# Patient Record
Sex: FEMALE | Race: BLACK OR AFRICAN AMERICAN | Employment: FULL TIME | URBAN - METROPOLITAN AREA
[De-identification: names, ages, dates, MRNs, and addresses within clinical notes are randomized per-mention and may not be internally consistent; named-entity substitution may affect disease eponyms.]

---

## 2017-07-10 DIAGNOSIS — Z01.419 WELL WOMAN EXAM WITH ROUTINE GYNECOLOGICAL EXAM: ICD-10-CM

## 2017-07-10 DIAGNOSIS — Z30.09 FAMILY PLANNING: Primary | ICD-10-CM

## 2017-07-10 RX ORDER — NORETHINDRONE ACETATE AND ETHINYL ESTRADIOL 1MG-20(21)
1 KIT ORAL DAILY
Qty: 1 PACKAGE | Refills: 11 | Status: SHIPPED | OUTPATIENT
Start: 2017-07-10 | End: 2019-03-08 | Stop reason: ALTCHOICE

## 2017-07-12 ENCOUNTER — HOSPITAL ENCOUNTER (OUTPATIENT)
Dept: LAB | Age: 42
Discharge: HOME | End: 2017-07-12
Payer: MEDICARE

## 2017-07-12 ENCOUNTER — OFFICE VISIT (OUTPATIENT)
Dept: OBGYN CLINIC | Age: 42
End: 2017-07-12

## 2017-07-12 VITALS
SYSTOLIC BLOOD PRESSURE: 131 MMHG | WEIGHT: 250 LBS | DIASTOLIC BLOOD PRESSURE: 74 MMHG | HEIGHT: 66 IN | BODY MASS INDEX: 40.18 KG/M2 | HEART RATE: 101 BPM

## 2017-07-12 DIAGNOSIS — Z11.3 SCREEN FOR STD (SEXUALLY TRANSMITTED DISEASE): ICD-10-CM

## 2017-07-12 DIAGNOSIS — Z01.419 WELL WOMAN EXAM WITH ROUTINE GYNECOLOGICAL EXAM: Primary | ICD-10-CM

## 2017-07-12 LAB — RPR SER QL: NONREACTIVE

## 2017-07-12 PROCEDURE — 86592 SYPHILIS TEST NON-TREP QUAL: CPT | Performed by: ADVANCED PRACTICE MIDWIFE

## 2017-07-12 PROCEDURE — 87491 CHLMYD TRACH DNA AMP PROBE: CPT | Performed by: ADVANCED PRACTICE MIDWIFE

## 2017-07-12 NOTE — PROGRESS NOTES
Subjective:   37 y.o. female for Well Woman Check. Patient's last menstrual period was 2017. Social History: single partner, contraception - OCP (Oral Contraceptive Pills). Pertinent past medical hstory: see medical hx below. Patient Active Problem List   Diagnosis Code    Bipolar 1 disorder ([facility].) F31.9    Hyperlipidemia E78.5    Genital herpes A60.00     Current Outpatient Prescriptions   Medication Sig Dispense Refill    norethindrone-ethinyl estradiol (GILDESS FE , ,) 1 mg-20 mcg (21)/75 mg (7) tab Take 1 Tab by mouth daily. Indications: Pregnancy Contraception 1 Package 11    pantoprazole (PROTONIX) 20 mg tablet Take 1 Tab by mouth daily. 30 Tab 0    topiramate (TOPAMAX) 25 mg tablet Take  by mouth two (2) times a day.  fenofibrate nanocrystallized (TRICOR) 48 mg tablet Take 1 Tab by mouth daily. 30 Tab 3    montelukast (SINGULAIR) 10 mg tablet Take 1 Tab by mouth daily. 30 Tab 5    acyclovir (ZOVIRAX) 400 mg tablet Take 1 Tab by mouth two (2) times a day. 60 Tab 5    fluticasone (FLONASE) 50 mcg/actuation nasal spray 2 Sprays by Both Nostrils route once.  ondansetron (ZOFRAN ODT) 8 mg disintegrating tablet Take 1 Tab by mouth every eight (8) hours as needed for Nausea. 20 Tab 0    phentermine 37.5 mg capsule Take 37.5 mg by mouth every morning. Max Daily Amount: 37.5 mg. 30 Cap 2    levocetirizine (XYZAL) 5 mg tablet Take 1 Tab by mouth daily. 30 Tab 2    MULTIVITAMIN PO Take  by mouth.  ARIPiprazole (ABILIFY) 15 mg tablet Take 15 mg by mouth daily.        No Known Allergies  Past Medical History:   Diagnosis Date    Bipolar 1 disorder ([facility]     Borderline diabetes     Genital herpes     Hyperlipidemia     Hyperlipidemia      Past Surgical History:   Procedure Laterality Date    HX  SECTION       Family History   Problem Relation Age of Onset    Hypertension Mother     Heart Disease Father     Heart Surgery Father     Hypertension Father    Jed Ross Diabetes Maternal Aunt      Social History   Substance Use Topics    Smoking status: Never Smoker    Smokeless tobacco: Never Used    Alcohol use No        ROS:  Feeling well. No dyspnea or chest pain on exertion. No abdominal pain, change in bowel habits, black or bloody stools. No urinary tract symptoms. GYN ROS: normal menses, no abnormal bleeding, pelvic pain or discharge, no breast pain or new or enlarging lumps on self exam. No neurological complaints. Objective:     Visit Vitals    /74 (BP 1 Location: Right arm, BP Patient Position: Sitting)    Pulse (!) 101    Ht 5' 6\" (1.676 m)    Wt 250 lb (113.4 kg)    LMP 07/04/2017    BMI 40.35 kg/m2     The patient appears well, alert, oriented x 3, in no distress. ENT normal.  Neck supple. No adenopathy or thyromegaly. TIM. Lungs are clear, good air entry, no wheezes, rhonchi or rales. S1 and S2 normal, no murmurs, regular rate and rhythm. Abdomen soft without tenderness, guarding, mass or organomegaly. Extremities show no edema, normal peripheral pulses. Neurological is normal, no focal findings.     BREAST EXAM: breasts appear normal, no suspicious masses, no skin or nipple changes or axillary nodes    PELVIC EXAM: VULVA: normal appearing vulva with no masses, tenderness or lesions, VAGINA: normal appearing vagina with normal color and discharge, no lesions, CERVIX: normal appearing cervix without discharge or lesions, DNA probe for chlamydia and GC obtained, cervical motion tenderness absent, UTERUS: uterus is normal size, shape, consistency and nontender, ADNEXA: normal adnexa in size, nontender and no masses    Assessment/Plan:   well woman  no contraindication to continue use of oral contraceptives  [facility] [address] [facility] - normal  pap smear normal hx, due 2019  counseled on breast self exam, mammography screening, STD prevention, HIV risk factors and prevention, use and side effects of OCP's and adequate intake of calcium and vitamin D  return annually or prn    ICD-10-CM ICD-9-CM    1. Well woman exam with routine gynecological exam Z01.419 V72.31    2. Screen for STD (sexually transmitted disease) Z11.3 V74.5 CT/NG/T.VAGINALIS AMPLIFICATION     reviewed diet, exercise and weight control.

## 2017-07-12 NOTE — PATIENT INSTRUCTIONS

## 2017-07-13 LAB
C TRACH RRNA SPEC QL NAA+PROBE: NEGATIVE
N GONORRHOEA RRNA SPEC QL NAA+PROBE: NEGATIVE
SPECIMEN SOURCE: NORMAL
T VAGINALIS RRNA SPEC QL NAA+PROBE: NEGATIVE

## 2018-08-09 ENCOUNTER — HOSPITAL ENCOUNTER (OUTPATIENT)
Dept: LAB | Age: 43
Discharge: HOME | End: 2018-08-09
Payer: MEDICARE

## 2018-08-09 ENCOUNTER — OFFICE VISIT (OUTPATIENT)
Dept: OBGYN CLINIC | Age: 43
End: 2018-08-09

## 2018-08-09 VITALS
BODY MASS INDEX: 42.65 KG/M2 | HEIGHT: 66 IN | WEIGHT: 265.4 LBS | DIASTOLIC BLOOD PRESSURE: 83 MMHG | HEART RATE: 108 BPM | SYSTOLIC BLOOD PRESSURE: 127 MMHG | TEMPERATURE: 98.9 F | RESPIRATION RATE: 18 BRPM | OXYGEN SATURATION: 98 %

## 2018-08-09 DIAGNOSIS — Z01.419 WELL WOMAN EXAM WITH ROUTINE GYNECOLOGICAL EXAM: Primary | ICD-10-CM

## 2018-08-09 PROBLEM — E66.01 OBESITY, MORBID (HCC): Status: ACTIVE | Noted: 2018-08-09

## 2018-08-09 PROCEDURE — 87624 HPV HI-RISK TYP POOLED RSLT: CPT | Performed by: OBSTETRICS & GYNECOLOGY

## 2018-08-09 PROCEDURE — 88175 CYTOPATH C/V AUTO FLUID REDO: CPT | Performed by: OBSTETRICS & GYNECOLOGY

## 2018-08-09 PROCEDURE — 87491 CHLMYD TRACH DNA AMP PROBE: CPT | Performed by: OBSTETRICS & GYNECOLOGY

## 2018-08-09 NOTE — PROGRESS NOTES
Subjective:   40 y.o. female for Well Woman Check. Patient's last menstrual period was 07/23/2018 (exact date). Social History: single partner, contraception - none. Pertinent past medical hstory: no history of HTN, DVT, CAD, DM, liver disease, migraines or smoking. ROS:  Feeling well. No dyspnea or chest pain on exertion. No abdominal pain, change in bowel habits, black or bloody stools. No urinary tract symptoms. GYN ROS: normal menses, no abnormal bleeding, pelvic pain or discharge, no breast pain or new or enlarging lumps on self exam. No neurological complaints. Objective:     Visit Vitals    /83 (BP 1 Location: Left arm, BP Patient Position: Sitting)    Pulse (!) 108    Temp 98.9 °F (37.2 °C) (Oral)    Resp 18    Ht 5' 6\" (1.676 m)    Wt 265 lb 6.4 oz (120.4 kg)    LMP 07/23/2018 (Exact Date)    SpO2 98%    BMI 42.84 kg/m2     The patient appears well, alert, oriented x 3, in no distress. ENT normal.  Neck supple. No adenopathy or thyromegaly. TIM. Lungs are clear, good air entry, no wheezes, rhonchi or rales. S1 and S2 normal, no murmurs, regular rate and rhythm. Abdomen soft without tenderness, guarding, mass or organomegaly. Extremities show no edema, normal peripheral pulses. Neurological is normal, no focal findings. BREAST EXAM: breasts appear normal, no suspicious masses, no skin or nipple changes or axillary nodes    PELVIC EXAM: normal external genitalia, vulva, vagina, cervix, uterus and adnexa    Assessment/Plan:   well woman  pap smear  return annually or prn  . Subjective:   40 y.o. female for Well Woman Check. Patient's last menstrual period was 07/23/2018 (exact date). Social History: single partner, contraception - none. Pertinent past medical hstory: no history of HTN, DVT, CAD, DM, liver disease, migraines or smoking. ROS:  Feeling well. No dyspnea or chest pain on exertion.   No abdominal pain, change in bowel habits, black or bloody stools. No urinary tract symptoms. GYN ROS: normal menses, no abnormal bleeding, pelvic pain or discharge, no breast pain or new or enlarging lumps on self exam. No neurological complaints. Objective:     Visit Vitals    /83 (BP 1 Location: Left arm, BP Patient Position: Sitting)    Pulse (!) 108    Temp 98.9 °F (37.2 °C) (Oral)    Resp 18    Ht 5' 6\" (1.676 m)    Wt 265 lb 6.4 oz (120.4 kg)    LMP 07/23/2018 (Exact Date)    SpO2 98%    BMI 42.84 kg/m2     The patient appears well, alert, oriented x 3, in no distress. ENT normal.  Neck supple. No adenopathy or thyromegaly. TIM. Lungs are clear, good air entry, no wheezes, rhonchi or rales. S1 and S2 normal, no murmurs, regular rate and rhythm. Abdomen soft without tenderness, guarding, mass or organomegaly. Extremities show no edema, normal peripheral pulses. Neurological is normal, no focal findings. BREAST EXAM: breasts appear normal, no suspicious masses, no skin or nipple changes or axillary nodes    PELVIC EXAM: normal external genitalia, vulva, vagina, cervix, uterus and adnexa    Assessment/Plan:   well woman  mammogram  pap smear  return annually or prn    ICD-10-CM ICD-9-CM    1. Well woman exam with routine gynecological exam Z01.419 V72.31 PAP IG, CT-NG-TV, APTIMA HPV AND [address] 72/89,11([id number])    [V72.31]   .

## 2018-08-09 NOTE — MR AVS SNAPSHOT
[place] 
 
 
 [address], [address] [address] [facility] [address] 
[phone] Patient: Chrystal Tillman MRN: [id number] HZX:[birth year] Visit Information Date & Time Provider Department Dept. Phone Encounter #  
 8/9/2018  2:30 PM Melida Lizama [id number] Follow-up Instructions Return in about 1 year (around 8/9/2019). Your Appointments 8/24/2018  2:30 PM  
ANNUAL with Autumn Valdes MD  
[facility] ([facility]) Appt Note: annual  
 [address], [place] [address] [facility] [address]  
[phone]  
  
   
 [address], [address] [address] Upcoming Health Maintenance Date Due Influenza Age 5 to Adult 8/1/2018 PAP AKA CERVICAL CYTOLOGY 6/27/2019 Allergies as of 8/9/2018  Review Complete On: 8/9/2018 By: Autumn Valdes MD  
 No Known Allergies Current Immunizations  Never Reviewed No immunizations on file. Not reviewed this visit You Were Diagnosed With   
  
 Codes Comments Well woman exam with routine gynecological exam    -  Primary ICD-10-CM: B90.125 ICD-9-CM: V72.31 [V72.31] Vitals BP Pulse Temp Resp Height(growth percentile) Weight(growth percentile) 127/83 (BP 1 Location: Left arm, BP Patient Position: Sitting) (!) 108 98.9 °F (37.2 °C) (Oral) 18 5' 6\" (1.676 m) 265 lb 6.4 oz (120.4 kg) LMP SpO2 BMI OB Status Smoking Status 07/23/2018 (Exact Date) 98% 42.84 kg/m2 Having regular periods Never Smoker Vitals History BMI and BSA Data Body Mass Index Body Surface Area  
 42.84 kg/m 2 2.37 m 2 Preferred Pharmacy Pharmacy Name Phone [facility], [facility] [phone] Your Updated Medication List  
  
   
This list is accurate as of 8/9/18 11:59 PM.  Always use your most recent med list.  
  
  
  
  
 ABILIFY 15 mg tablet Generic drug:  ARIPiprazole Take 20 mg by mouth daily. acyclovir 400 mg tablet Commonly known as:  ZOVIRAX Take 1 Tab by mouth two (2) times a day. fenofibrate nanocrystallized 48 mg tablet Commonly known as:  [employer] Take 1 Tab by mouth daily. fluticasone 50 mcg/actuation nasal spray Commonly known as:  Beni [place] 2 Sprays by Both Nostrils route once. levocetirizine 5 mg tablet Commonly known as:  Andres Evelio Take 1 Tab by mouth daily. montelukast 10 mg tablet Commonly known as:  SINGULAIR Take 1 Tab by mouth daily. MULTIVITAMIN PO Take  by mouth. norethindrone-ethinyl estradiol 1 mg-20 mcg (21)/75 mg (7) Tab Commonly known as:  GILDESS FE 1/20 (28) Take 1 Tab by mouth daily. Indications: Pregnancy Contraception  
  
 ondansetron 8 mg disintegrating tablet Commonly known as:  ZOFRAN ODT Take 1 Tab by mouth every eight (8) hours as needed for Nausea. pantoprazole 20 mg tablet Commonly known as:  PROTONIX Take 1 Tab by mouth daily. phentermine 37.5 mg capsule Take 37.5 mg by mouth every morning. Max Daily Amount: 37.5 mg.  
  
 topiramate 25 mg tablet Commonly known as:  TOPAMAX Take  by mouth two (2) times a day. Follow-up Instructions Return in about 1 year (around 8/9/2019). Patient Instructions Well Visit, Ages 25 to 48: Care Instructions Your Care Instructions Physical exams can help you stay healthy. Your doctor has checked your overall health and may have suggested ways to take good care of yourself. He or she also may have recommended tests. At home, you can help prevent illness with healthy eating, regular exercise, and other steps. Follow-up care is a key part of your treatment and safety. Be sure to make and go to all appointments, and call your doctor if you are having problems. It's also a good idea to know your test results and keep a list of the medicines you take. How can you care for yourself at home? · Reach and stay at a healthy weight. This will lower your risk for many problems, such as obesity, diabetes, heart disease, and high blood pressure. · Get at least 30 minutes of physical activity on most days of the week. Walking is a good choice. You also may want to do other activities, such as running, swimming, cycling, or playing tennis or team sports. Discuss any changes in your exercise program with your doctor. · Do not smoke or allow others to smoke around you. If you need help quitting, talk to your doctor about stop-smoking programs and medicines. These can increase your chances of quitting for good. · Talk to your doctor about whether you have any risk factors for sexually transmitted infections (STIs). Having one sex partner (who does not have STIs and does not have sex with anyone else) is a good way to avoid these infections. · Use birth control if you do not want to have children at this time. Talk with your doctor about the choices available and what might be best for you. · Protect your skin from too much sun. When you're outdoors from 10 a.m. to 4 p.m., stay in the shade or cover up with clothing and a hat with a wide brim. Wear sunglasses that block UV rays. Even when it's cloudy, put broad-spectrum sunscreen (SPF 30 or higher) on any exposed skin. · See a dentist one or two times a year for checkups and to have your teeth cleaned. · Wear a seat belt in the car. · Drink alcohol in moderation, if at all. That means no more than 2 drinks a day for men and 1 drink a day for women. Follow your doctor's advice about when to have certain tests. These tests can spot problems early. For everyone · Cholesterol. Have the fat (cholesterol) in your blood tested after age 21. Your doctor will tell you how often to have this done based on your age, family history, or other things that can increase your risk for heart disease. · Blood pressure. Have your blood pressure checked during a routine doctor visit. Your doctor will tell you how often to check your blood pressure based on your age, your blood pressure results, and other factors. · Vision. Talk with your doctor about how often to have a glaucoma test. 
· Diabetes. Ask your doctor whether you should have tests for diabetes. · Colon cancer. Have a test for colon cancer at age 48. You may have one of several tests. If you are younger than 48, you may need a test earlier if you have any risk factors. Risk factors include whether you already had a precancerous polyp removed from your colon or whether your parent, brother, sister, or child has had colon cancer. For women · Breast exam and mammogram. Talk to your doctor about when you should have a clinical breast exam and a mammogram. Medical experts differ on whether and how often women under 50 should have these tests. Your doctor can help you decide what is right for you. · Pap test and pelvic exam. Begin Pap tests at age 24. A Pap test is the best way to find cervical cancer. The test often is part of a pelvic exam. Ask how often to have this test. 
· Tests for sexually transmitted infections (STIs). Ask whether you should have tests for STIs. You may be at risk if you have sex with more than one person, especially if your partners do not wear condoms. For men · Tests for sexually transmitted infections (STIs). Ask whether you should have tests for STIs. You may be at risk if you have sex with more than one person, especially if you do not wear a condom. · Testicular cancer exam. Ask your doctor whether you should check your testicles regularly. · Prostate exam. Talk to your doctor about whether you should have a blood test (called a PSA test) for prostate cancer.  Experts differ on whether and when men should have this test. Some experts suggest it if you are older than 39 and are -American or have a father or brother who got prostate cancer when he was younger than 72. When should you call for help? Watch closely for changes in your health, and be sure to contact your doctor if you have any problems or symptoms that concern you. Where can you learn more? Go to http://abdi-lilian.info/. Enter P072 in the search box to learn more about \"Well Visit, Ages 25 to 48: Care Instructions. \" Current as of: May 16, 2017 Content Version: 11.7 © 8180-1436 [employer]. Care instructions adapted under license by [employer] (which disclaims liability or warranty for this information). If you have questions about a medical condition or this instruction, always ask your healthcare professional. [address] any warranty or liability for your use of this information. Well Visit, Ages 25 to 48: Care Instructions Your Care Instructions Physical exams can help you stay healthy. Your doctor has checked your overall health and may have suggested ways to take good care of yourself. He or she also may have recommended tests. At home, you can help prevent illness with healthy eating, regular exercise, and other steps. Follow-up care is a key part of your treatment and safety. Be sure to make and go to all appointments, and call your doctor if you are having problems. It's also a good idea to know your test results and keep a list of the medicines you take. How can you care for yourself at home? · Reach and stay at a healthy weight. This will lower your risk for many problems, such as obesity, diabetes, heart disease, and high blood pressure. · Get at least 30 minutes of physical activity on most days of the week. Walking is a good choice. You also may want to do other activities, such as running, swimming, cycling, or playing tennis or team sports.  Discuss any changes in your exercise program with your doctor. · Do not smoke or allow others to smoke around you. If you need help quitting, talk to your doctor about stop-smoking programs and medicines. These can increase your chances of quitting for good. · Talk to your doctor about whether you have any risk factors for sexually transmitted infections (STIs). Having one sex partner (who does not have STIs and does not have sex with anyone else) is a good way to avoid these infections. · Use birth control if you do not want to have children at this time. Talk with your doctor about the choices available and what might be best for you. · Protect your skin from too much sun. When you're outdoors from 10 a.m. to 4 p.m., stay in the shade or cover up with clothing and a hat with a wide brim. Wear sunglasses that block UV rays. Even when it's cloudy, put broad-spectrum sunscreen (SPF 30 or higher) on any exposed skin. · See a dentist one or two times a year for checkups and to have your teeth cleaned. · Wear a seat belt in the car. · Drink alcohol in moderation, if at all. That means no more than 2 drinks a day for men and 1 drink a day for women. Follow your doctor's advice about when to have certain tests. These tests can spot problems early. For everyone · Cholesterol. Have the fat (cholesterol) in your blood tested after age 21. Your doctor will tell you how often to have this done based on your age, family history, or other things that can increase your risk for heart disease. · Blood pressure. Have your blood pressure checked during a routine doctor visit. Your doctor will tell you how often to check your blood pressure based on your age, your blood pressure results, and other factors. · Vision. Talk with your doctor about how often to have a glaucoma test. 
· Diabetes. Ask your doctor whether you should have tests for diabetes. · Colon cancer. Have a test for colon cancer at age 48.  You may have one of several tests. If you are younger than 48, you may need a test earlier if you have any risk factors. Risk factors include whether you already had a precancerous polyp removed from your colon or whether your parent, brother, sister, or child has had colon cancer. For women · Breast exam and mammogram. Talk to your doctor about when you should have a clinical breast exam and a mammogram. Medical experts differ on whether and how often women under 50 should have these tests. Your doctor can help you decide what is right for you. · Pap test and pelvic exam. Begin Pap tests at age 24. A Pap test is the best way to find cervical cancer. The test often is part of a pelvic exam. Ask how often to have this test. 
· Tests for sexually transmitted infections (STIs). Ask whether you should have tests for STIs. You may be at risk if you have sex with more than one person, especially if your partners do not wear condoms. For men · Tests for sexually transmitted infections (STIs). Ask whether you should have tests for STIs. You may be at risk if you have sex with more than one person, especially if you do not wear a condom. · Testicular cancer exam. Ask your doctor whether you should check your testicles regularly. · Prostate exam. Talk to your doctor about whether you should have a blood test (called a PSA test) for prostate cancer. Experts differ on whether and when men should have this test. Some experts suggest it if you are older than 39 and are -American or have a father or brother who got prostate cancer when he was younger than 72. When should you call for help? Watch closely for changes in your health, and be sure to contact your doctor if you have any problems or symptoms that concern you. Where can you learn more? Go to http://abdi-lilian.info/. Enter P072 in the search box to learn more about \"Well Visit, Ages 25 to 48: Care Instructions. \" Current as of: May 16, 2017 Content Version: 11.7 © 9530-6310 [employer]. Care instructions adapted under license by [employer] (which disclaims liability or warranty for this information). If you have questions about a medical condition or this instruction, always ask your healthcare professional. [address] any warranty or liability for your use of this information. Introducing \[facility] [place] [facility]\"" & HEALTH SERVICES! [facility] introduces [employer] patient portal. Now you can access parts of your medical record, email your doctor's office, and request medication refills online. 1. In your internet browser, go to [email or web address] 2. Click on the First Time User? Click Here link in the Sign In box. You will see the New Member Sign Up page. 3. Enter your [employer] Access Code exactly as it appears below. You will not need to use this code after youve completed the sign-up process. If you do not sign up before the expiration date, you must request a new code. · [employer] Access Code: [email or web address] Expires: 11/7/2018  3:29 PM 
 
4. Enter the last four digits of your Social Security Number (xxxx) and Date of Birth (mm/dd/yyyy) as indicated and click Submit. You will be taken to the next sign-up page. 5. Create a [employer] ID. This will be your [employer] login ID and cannot be changed, so think of one that is secure and easy to remember. 6. Create a [employer] password. You can change your password at any time. 7. Enter your Password Reset Question and Answer. This can be used at a later time if you forget your password. 8. Enter your e-mail address. You will receive e-mail notification when new information is available in [address] 9. Click Sign Up. You can now view and download portions of your medical record. 10. Click the Download Summary menu link to download a portable copy of your medical information. If you have questions, please visit the Frequently Asked Questions section of the [employer] website. Remember, [employer] is NOT to be used for urgent needs. For medical emergencies, dial 911. Now available from your iPhone and Android! Please provide this summary of care documentation to your next provider. Your primary care clinician is listed as Kiara Bo. If you have any questions after today's visit, please call [phone].

## 2018-08-09 NOTE — PATIENT INSTRUCTIONS

## 2019-03-08 ENCOUNTER — OFFICE VISIT (OUTPATIENT)
Dept: FAMILY MEDICINE CLINIC | Age: 44
End: 2019-03-08

## 2019-03-08 VITALS
WEIGHT: 269 LBS | HEIGHT: 65 IN | DIASTOLIC BLOOD PRESSURE: 83 MMHG | HEART RATE: 93 BPM | SYSTOLIC BLOOD PRESSURE: 137 MMHG | RESPIRATION RATE: 16 BRPM | OXYGEN SATURATION: 100 % | BODY MASS INDEX: 44.82 KG/M2 | TEMPERATURE: 98.5 F

## 2019-03-08 DIAGNOSIS — E78.5 HYPERLIPIDEMIA, UNSPECIFIED HYPERLIPIDEMIA TYPE: ICD-10-CM

## 2019-03-08 DIAGNOSIS — Z00.00 WELL WOMAN EXAM (NO GYNECOLOGICAL EXAM): Primary | ICD-10-CM

## 2019-03-08 DIAGNOSIS — R73.03 PREDIABETES: ICD-10-CM

## 2019-03-08 NOTE — PROGRESS NOTES
Subjective:   44 y.o. female for Well Woman Check. Her gyne and breast care is done elsewhere by her Ob-Gyne physician. Patient Active Problem List   Diagnosis Code    Bipolar 1 disorder ([facility] F31.9    Hyperlipidemia E78.5    Genital herpes A60.00    Obesity, morbid ([facility] E66.01     Patient Active Problem List    Diagnosis Date Noted    Obesity, morbid ([facility] 2018    Genital herpes     Bipolar 1 disorder (HCC)     Hyperlipidemia      Current Outpatient Medications   Medication Sig Dispense Refill    montelukast (SINGULAIR) 10 mg tablet Take 1 Tab by mouth daily. 30 Tab 5    acyclovir (ZOVIRAX) 400 mg tablet Take 1 Tab by mouth two (2) times a day. 60 Tab 5    fluticasone (FLONASE) 50 mcg/actuation nasal spray 2 Sprays by Both Nostrils route once.  MULTIVITAMIN PO Take  by mouth.  ARIPiprazole (ABILIFY) 15 mg tablet Take 20 mg by mouth daily.  fenofibrate nanocrystallized (TRICOR) 48 mg tablet Take 1 Tab by mouth daily. 30 Tab 3    phentermine 37.5 mg capsule Take 37.5 mg by mouth every morning. Max Daily Amount: 37.5 mg. 30 Cap 2     No Known Allergies  Past Medical History:   Diagnosis Date    Bipolar 1 disorder ([facility]     Borderline diabetes     Genital herpes     Hyperlipidemia     Hyperlipidemia      Past Surgical History:   Procedure Laterality Date    HX  SECTION       Family History   Problem Relation Age of Onset    Hypertension Mother     Heart Disease Father     Heart Surgery Father     Hypertension Father     Diabetes Maternal Aunt      Social History     Tobacco Use    Smoking status: Never Smoker    Smokeless tobacco: Never Used   Substance Use Topics    Alcohol use: No     Alcohol/week: 0.0 oz        ROS: Feeling generally well. No TIA's or unusual headaches, no dysphagia. No prolonged cough. No dyspnea or chest pain on exertion. No abdominal pain, change in bowel habits, black or bloody stools. No urinary tract symptoms.   No new or unusual musculoskeletal symptoms. Specific concerns today: none. Objective: The patient appears well, alert, oriented x 3, in no distress. Visit Vitals  /83 (BP 1 Location: Left arm)   Pulse 93   Temp 98.5 °F (36.9 °C) (Oral)   Resp 16   Ht 5' 4.96\" (1.65 m)   Wt 269 lb (122 kg)   SpO2 100%   BMI 44.82 kg/m²     ENT normal.  Neck supple. No adenopathy or thyromegaly. TIM. Lungs are clear, good air entry, no wheezes, rhonchi or rales. S1 and S2 normal, no murmurs, regular rate and rhythm. Abdomen soft without tenderness, guarding, mass or organomegaly. Extremities show no edema, normal peripheral pulses. Neurological is normal, no focal findings. Breast and Pelvic exams are deferred. Assessment/Plan:   Well Woman  lose weight, increase physical activity, routine labs ordered    ICD-10-CM ICD-9-CM    1. Well woman exam (no gynecological exam) Z00.00 V70.0     [V70.0]   2. Prediabetes R73.03 790.29 HEMOGLOBIN A1C WITH EAG   3. Hyperlipidemia, unspecified hyperlipidemia type L48.3 242.1 METABOLIC PANEL, COMPREHENSIVE      LIPID PANEL     I have discussed the diagnosis with the patient and the intended plan as seen in the above orders. The patient has received an after-visit summary and questions were answered concerning future plans. I have discussed medication side effects and warnings with the patient as well. Patient agreeable with above plan and verbalizes understanding. Follow-up Disposition:  Return in about 1 year (around 3/8/2020), or if symptoms worsen or fail to improve, for CPE with fasting labs.

## 2019-03-08 NOTE — PROGRESS NOTES
Pt is here for re-establish care Well Woman Exam    1. Have you been to the ER, urgent care clinic since your last visit? Hospitalized since your last visit? Yes Urgent Care 1/19 Ear infection    2. Have you seen or consulted any other health care providers outside of the [address] [place] [address] since your last visit? Include any pap smears or colon screening.  No

## 2019-03-08 NOTE — PATIENT INSTRUCTIONS
Well Visit, Ages 25 to 48: Care Instructions  Your Care Instructions    Physical exams can help you stay healthy. Your doctor has checked your overall health and may have suggested ways to take good care of yourself. He or she also may have recommended tests. At home, you can help prevent illness with healthy eating, regular exercise, and other steps. Follow-up care is a key part of your treatment and safety. Be sure to make and go to all appointments, and call your doctor if you are having problems. It's also a good idea to know your test results and keep a list of the medicines you take. How can you care for yourself at home? · Reach and stay at a healthy weight. This will lower your risk for many problems, such as obesity, diabetes, heart disease, and high blood pressure. · Get at least 30 minutes of physical activity on most days of the week. Walking is a good choice. You also may want to do other activities, such as running, swimming, cycling, or playing tennis or team sports. Discuss any changes in your exercise program with your doctor. · Do not smoke or allow others to smoke around you. If you need help quitting, talk to your doctor about stop-smoking programs and medicines. These can increase your chances of quitting for good. · Talk to your doctor about whether you have any risk factors for sexually transmitted infections (STIs). Having one sex partner (who does not have STIs and does not have sex with anyone else) is a good way to avoid these infections. · Use birth control if you do not want to have children at this time. Talk with your doctor about the choices available and what might be best for you. · Protect your skin from too much sun. When you're outdoors from 10 a.m. to 4 p.m., stay in the shade or cover up with clothing and a hat with a wide brim. Wear sunglasses that block UV rays. Even when it's cloudy, put broad-spectrum sunscreen (SPF 30 or higher) on any exposed skin.   · See a dentist one or two times a year for checkups and to have your teeth cleaned. · Wear a seat belt in the car. · Drink alcohol in moderation, if at all. That means no more than 2 drinks a day for men and 1 drink a day for women. Follow your doctor's advice about when to have certain tests. These tests can spot problems early. For everyone  · Cholesterol. Have the fat (cholesterol) in your blood tested after age 21. Your doctor will tell you how often to have this done based on your age, family history, or other things that can increase your risk for heart disease. · Blood pressure. Have your blood pressure checked during a routine doctor visit. Your doctor will tell you how often to check your blood pressure based on your age, your blood pressure results, and other factors. · Vision. Talk with your doctor about how often to have a glaucoma test.  · Diabetes. Ask your doctor whether you should have tests for diabetes. · Colon cancer. Have a test for colon cancer at age 48. You may have one of several tests. If you are younger than 48, you may need a test earlier if you have any risk factors. Risk factors include whether you already had a precancerous polyp removed from your colon or whether your parent, brother, sister, or child has had colon cancer. For women  · Breast exam and mammogram. Talk to your doctor about when you should have a clinical breast exam and a mammogram. Medical experts differ on whether and how often women under 50 should have these tests. Your doctor can help you decide what is right for you. · Pap test and pelvic exam. Begin Pap tests at age 24. A Pap test is the best way to find cervical cancer. The test often is part of a pelvic exam. Ask how often to have this test.  · Tests for sexually transmitted infections (STIs). Ask whether you should have tests for STIs. You may be at risk if you have sex with more than one person, especially if your partners do not wear condoms.   For men  · Tests for sexually transmitted infections (STIs). Ask whether you should have tests for STIs. You may be at risk if you have sex with more than one person, especially if you do not wear a condom. · Testicular cancer exam. Ask your doctor whether you should check your testicles regularly. · Prostate exam. Talk to your doctor about whether you should have a blood test (called a PSA test) for prostate cancer. Experts differ on whether and when men should have this test. Some experts suggest it if you are older than 39 and are -American or have a father or brother who got prostate cancer when he was younger than 72. When should you call for help? Watch closely for changes in your health, and be sure to contact your doctor if you have any problems or symptoms that concern you. Where can you learn more? Go to http://abdi-lilian.info/. Enter P072 in the search box to learn more about \"Well Visit, Ages 25 to 48: Care Instructions. \"  Current as of: March 28, 2018  Content Version: 11.9  © 0996-9877 [employer]. Care instructions adapted under license by [employer] (which disclaims liability or warranty for this information). If you have questions about a medical condition or this instruction, always ask your healthcare professional. [address] any warranty or liability for your use of this information. Learning About Dietary Guidelines  What are the Dietary Guidelines for Americans? Dietary Guidelines for Americans provide tips for eating well and staying healthy. This helps reduce the risk for long-term (chronic) diseases. These adult guidelines from the [facility] recommend that you:  · Eat lots of fruits, vegetables, whole grains, and low-fat or nonfat dairy products. · Try to balance your eating with your activity. This helps you stay at a healthy weight. · Drink alcohol in moderation, if at all.   · Limit foods high in salt, saturated fat, trans fat, and added sugar. What is MyPlate? MyPlate is the U.[place] government's food guide. It can help you make your own well-balanced eating plan. A balanced eating plan means that you eat enough, but not too much, and that your food gives you the nutrients you need to stay healthy. MyPlate focuses on eating plenty of whole grains, fruits, and vegetables, and on limiting fat and sugar. It is available online at www. ChooseMyPlate.gov. How can you get started? MyPlate suggests that most adults eat certain amounts from the different food groups:  Grains  Eat 5 to 8 ounces of grains each day. Half of those should be whole grains. Choose whole-grain breads, cold and cooked cereals and grains, pasta (without creamy sauces), hard rolls, or low-fat or fat-free crackers. Vegetables  Eat 2 to 3 cups of vegetables every day. They contain little if any fat. And they have lots of nutrients that help protect against heart disease. Fruits  Eat 1½ to 2 cups of fruits every day. Fruits contain very little fat but lots of nutrients. Protein foods  Most adults need 5 to 6½ ounces each day. Choose fish and lean poultry more often. Eat red meat and fried meats less often. Dried beans, tofu, and nuts are also good sources of protein. Dairy  Most adults need 3 cups of milk and milk products a day. Choose low-fat or fat-free products from this food group. If you have problems digesting milk, try eating cheese or yogurt instead. Limit fats and oils, including those used in cooking. When you do use fats, choose oils that are liquid at room temperature (unsaturated fats). These include canola oil and olive oil. Avoid foods with trans fats, such as many fried foods, cookies, and snack foods. Where can you learn more? Go to http://abdi-lilian.info/. Enter Y198 in the search box to learn more about \"Learning About Dietary Guidelines. \"  Current as of: March 28, 2018  Content Version: 11.9  © 8361-0791 [employer]. Care instructions adapted under license by [employer] (which disclaims liability or warranty for this information). If you have questions about a medical condition or this instruction, always ask your healthcare professional. [address] any warranty or liability for your use of this information.

## 2019-04-16 LAB
ALBUMIN SERPL-MCNC: 4 G/DL (ref 3.5–5.5)
ALBUMIN/GLOB SERPL: 1.3 {RATIO} (ref 1.2–2.2)
ALP SERPL-CCNC: 65 IU/L (ref 39–117)
ALT SERPL-CCNC: 11 IU/L (ref 0–32)
AST SERPL-CCNC: 12 IU/L (ref 0–40)
BILIRUB SERPL-MCNC: 0.6 MG/DL (ref 0–1.2)
BUN SERPL-MCNC: 9 MG/DL (ref 6–24)
BUN/CREAT SERPL: 12 (ref 9–23)
CALCIUM SERPL-MCNC: 8.9 MG/DL (ref 8.7–10.2)
CHLORIDE SERPL-SCNC: 105 MMOL/L (ref 96–106)
CHOLEST SERPL-MCNC: 144 MG/DL (ref 100–199)
CO2 SERPL-SCNC: 25 MMOL/L (ref 20–29)
CREAT SERPL-MCNC: 0.78 MG/DL (ref 0.57–1)
EST. AVERAGE GLUCOSE BLD GHB EST-MCNC: 108 MG/DL
GLOBULIN SER CALC-MCNC: 3 G/DL (ref 1.5–4.5)
GLUCOSE SERPL-MCNC: 72 MG/DL (ref 65–99)
HBA1C MFR BLD: 5.4 % (ref 4.8–5.6)
HDLC SERPL-MCNC: 43 MG/DL
LDLC SERPL CALC-MCNC: 84 MG/DL (ref 0–99)
POTASSIUM SERPL-SCNC: 4.1 MMOL/L (ref 3.5–5.2)
PROT SERPL-MCNC: 7 G/DL (ref 6–8.5)
SODIUM SERPL-SCNC: 142 MMOL/L (ref 134–144)
TRIGL SERPL-MCNC: 83 MG/DL (ref 0–149)
VLDLC SERPL CALC-MCNC: 17 MG/DL (ref 5–40)

## 2019-04-22 ENCOUNTER — TELEPHONE (OUTPATIENT)
Dept: FAMILY MEDICINE CLINIC | Age: 44
End: 2019-04-22

## 2019-04-22 NOTE — TELEPHONE ENCOUNTER
Attempted to contact the patient. No answer and no voicemail set up. Will try again later. Letter was sent to patient.

## 2020-03-18 ENCOUNTER — OFFICE VISIT (OUTPATIENT)
Dept: FAMILY MEDICINE CLINIC | Age: 45
End: 2020-03-18

## 2020-03-18 VITALS
DIASTOLIC BLOOD PRESSURE: 78 MMHG | WEIGHT: 276 LBS | OXYGEN SATURATION: 99 % | RESPIRATION RATE: 20 BRPM | BODY MASS INDEX: 45.98 KG/M2 | HEIGHT: 65 IN | TEMPERATURE: 98.9 F | HEART RATE: 97 BPM | SYSTOLIC BLOOD PRESSURE: 126 MMHG

## 2020-03-18 DIAGNOSIS — H69.83 DYSFUNCTION OF BOTH EUSTACHIAN TUBES: Primary | ICD-10-CM

## 2020-03-18 DIAGNOSIS — G56.03 BILATERAL CARPAL TUNNEL SYNDROME: ICD-10-CM

## 2020-03-18 DIAGNOSIS — J30.9 ALLERGIC RHINITIS: ICD-10-CM

## 2020-03-18 RX ORDER — FLUOXETINE 10 MG/1
CAPSULE ORAL DAILY
COMMUNITY
End: 2020-08-18

## 2020-03-18 RX ORDER — TRIAMCINOLONE ACETONIDE 55 UG/1
2 SPRAY, METERED NASAL DAILY
Qty: 1 BOTTLE | Refills: 1 | Status: SHIPPED | OUTPATIENT
Start: 2020-03-18 | End: 2020-03-18 | Stop reason: SDUPTHER

## 2020-03-18 NOTE — TELEPHONE ENCOUNTER
Patient said that the rx was sent to wrong pharmacy. Pharmacy is [facility].  Changed in [facility] but sent to [facility] pharmacy

## 2020-03-18 NOTE — PATIENT INSTRUCTIONS
Earache: Care Instructions Your Care Instructions Even though infection is a common cause of ear pain, not all ear pain means an infection. If you have ear pain and don't have an infection, it could be because of a jaw problem, such as temporomandibular joint (TMJ) pain. Or it could be because of a neck problem. When ear discomfort or pain is mild or comes and goes without other symptoms, home treatment may be all you need. Follow-up care is a key part of your treatment and safety. Be sure to make and go to all appointments, and call your doctor if you are having problems. It's also a good idea to know your test results and keep a list of the medicines you take. How can you care for yourself at home? · Apply heat on the ear to ease pain. To apply heat, put a warm water bottle, a heating pad set on low, or a warm cloth on your ear. Do not go to sleep with a heating pad on your skin. · Take an over-the-counter pain medicine, such as acetaminophen (Tylenol), ibuprofen (Advil, Motrin), or naproxen (Aleve). Be safe with medicines. Read and follow all instructions on the label. · Do not take two or more pain medicines at the same time unless the doctor told you to. Many pain medicines have acetaminophen, which is Tylenol. Too much acetaminophen (Tylenol) can be harmful. · Never insert anything, such as a cotton swab or a kimber pin, into the ear. When should you call for help? Call your doctor now or seek immediate medical care if: 
  · You have new or worse symptoms of infection, such as: 
? Increased pain, swelling, warmth, or redness. ? Red streaks leading from the area. ? Pus draining from the area. ? A fever.  
 Watch closely for changes in your health, and be sure to contact your doctor if: 
  · You have new or worse discharge coming from the ear.  
  · You do not get better as expected. Where can you learn more? Go to http://abdi-lilian.info/ Enter T344 in the search box to learn more about \"Earache: Care Instructions. \" Current as of: July 28, 2019Content Version: 12.4 © 3619-0800 [employer]. Care instructions adapted under license by [employer] (which disclaims liability or warranty for this information). If you have questions about a medical condition or this instruction, always ask your healthcare professional. [place] [address] any warranty or liability for your use of this information.

## 2020-03-18 NOTE — PROGRESS NOTES
Patient is in the office today for bilateral earache. 1. Have you been to the ER, urgent care clinic since your last visit? Hospitalized since your last visit? yes on 03/09/20 for earache    2. Have you seen or consulted any other health care providers outside of the [address] [place] [address] since your last visit? Include any pap smears or colon screening.  No

## 2020-03-18 NOTE — PROGRESS NOTES
HISTORY OF PRESENT ILLNESS  Nithya Kerr is a 38 y.o. female. Pt who is pregnant c/o pressure in both ears as well as some nasal congestion and mild PND. She was told by GYN she can take Claritin. Pt also c/o numbness in both hands especially when she wakes up in the AM     Ear Pain   The history is provided by the patient. This is a new problem. The current episode started more than 2 days ago. The problem occurs constantly. The problem has not changed since onset. Pertinent negatives include no shortness of breath. Nothing aggravates the symptoms. Nothing relieves the symptoms. She has tried nothing for the symptoms. The treatment provided no relief. Review of Systems   HENT: Positive for congestion. Respiratory: Negative for cough, sputum production and shortness of breath. Neurological: Positive for tingling (in both hands). Physical Exam  Vitals signs reviewed. HENT:      Right Ear: Tympanic membrane and ear canal normal.      Left Ear: Tympanic membrane and ear canal normal.      Nose: Mucosal edema and congestion present. Mouth/Throat:      Pharynx: Oropharynx is clear. Cardiovascular:      Rate and Rhythm: Normal rate and regular rhythm. Heart sounds: Normal heart sounds. Pulmonary:      Effort: Pulmonary effort is normal.      Breath sounds: Normal breath sounds. Abdominal:      Comments: gravid   Neurological:      Comments: Positive Phalen sign both wrist. Negative Tinel sign both wrist.          ASSESSMENT and PLAN    ICD-10-CM ICD-9-CM    1. Dysfunction of both eustachian tubes H69.83 381.81 Will continue Claritin and add Nasacort NS   2. Bilateral carpal tunnel syndrome G56.03 354.0 Pt to use wrist splints at night. If no improvement will refer to Hand surgeon. Reviewed plan of care. Patient has provided input and agrees with goals.

## 2020-03-19 RX ORDER — TRIAMCINOLONE ACETONIDE 55 UG/1
2 SPRAY, METERED NASAL DAILY
Qty: 1 BOTTLE | Refills: 1 | Status: SHIPPED | OUTPATIENT
Start: 2020-03-19 | End: 2020-08-18 | Stop reason: SDUPTHER

## 2020-08-07 ENCOUNTER — TELEPHONE (OUTPATIENT)
Dept: FAMILY MEDICINE CLINIC | Age: 45
End: 2020-08-07

## 2020-08-07 NOTE — TELEPHONE ENCOUNTER
----- Message from Cori Rivers sent at 8/7/2020  9:07 AM EDT -----  Regarding: In Person visit  Please contact the patient regarding a face to face visit for hypertension. She declined a virtual visit.

## 2020-08-18 ENCOUNTER — OFFICE VISIT (OUTPATIENT)
Dept: FAMILY MEDICINE CLINIC | Age: 45
End: 2020-08-18

## 2020-08-18 VITALS
BODY MASS INDEX: 46.26 KG/M2 | DIASTOLIC BLOOD PRESSURE: 75 MMHG | OXYGEN SATURATION: 98 % | HEART RATE: 87 BPM | WEIGHT: 271 LBS | HEIGHT: 64 IN | TEMPERATURE: 98.6 F | RESPIRATION RATE: 20 BRPM | SYSTOLIC BLOOD PRESSURE: 127 MMHG

## 2020-08-18 DIAGNOSIS — B37.9 ANTIBIOTIC-INDUCED YEAST INFECTION: ICD-10-CM

## 2020-08-18 DIAGNOSIS — T36.95XA ANTIBIOTIC-INDUCED YEAST INFECTION: ICD-10-CM

## 2020-08-18 DIAGNOSIS — B00.9 HSV-2 INFECTION: ICD-10-CM

## 2020-08-18 DIAGNOSIS — I15.8 OTHER SECONDARY HYPERTENSION: Primary | ICD-10-CM

## 2020-08-18 DIAGNOSIS — J30.2 SEASONAL ALLERGIC RHINITIS, UNSPECIFIED TRIGGER: ICD-10-CM

## 2020-08-18 PROBLEM — R60.0 EDEMA OF BOTH LOWER LEGS: Status: ACTIVE | Noted: 2020-07-30

## 2020-08-18 PROBLEM — O99.019 ANTEPARTUM ANEMIA: Status: ACTIVE | Noted: 2020-07-30

## 2020-08-18 PROBLEM — O34.219 PREVIOUS CESAREAN DELIVERY, DELIVERED: Status: ACTIVE | Noted: 2020-07-27

## 2020-08-18 RX ORDER — TRIAMCINOLONE ACETONIDE 55 UG/1
2 SPRAY, METERED NASAL DAILY
Qty: 1 BOTTLE | Refills: 1 | Status: SHIPPED | OUTPATIENT
Start: 2020-08-18 | End: 2021-01-18 | Stop reason: SDUPTHER

## 2020-08-18 RX ORDER — ACYCLOVIR 400 MG/1
400 TABLET ORAL 2 TIMES DAILY
Qty: 60 TAB | Refills: 5 | Status: SHIPPED | OUTPATIENT
Start: 2020-08-18 | End: 2022-07-26

## 2020-08-18 RX ORDER — LANOLIN ALCOHOL/MO/W.PET/CERES
1 CREAM (GRAM) TOPICAL 2 TIMES DAILY
COMMUNITY
Start: 2020-07-30 | End: 2021-09-13

## 2020-08-18 RX ORDER — FLUCONAZOLE 150 MG/1
150 TABLET ORAL DAILY
Qty: 1 TAB | Refills: 0 | Status: SHIPPED | OUTPATIENT
Start: 2020-08-18 | End: 2020-08-19

## 2020-08-18 RX ORDER — LORATADINE 10 MG/1
1 TABLET ORAL DAILY
COMMUNITY
Start: 2020-07-02 | End: 2021-01-18 | Stop reason: SDUPTHER

## 2020-08-18 RX ORDER — HYDROCHLOROTHIAZIDE 25 MG/1
25 TABLET ORAL DAILY
COMMUNITY
End: 2021-01-08 | Stop reason: SDUPTHER

## 2020-08-18 RX ORDER — PRAMOXINE HYDROCHLORIDE 10 MG/G
AEROSOL, FOAM TOPICAL
COMMUNITY
Start: 2020-07-17 | End: 2021-09-13

## 2020-08-18 RX ORDER — LABETALOL 300 MG/1
300 TABLET, FILM COATED ORAL 2 TIMES DAILY
COMMUNITY
End: 2021-01-08 | Stop reason: SDUPTHER

## 2020-08-18 NOTE — PROGRESS NOTES
Chandni Acosta presents today for   Chief Complaint   Patient presents with    Follow Up Chronic Condition     blood pressure    Medication Refill    Yeast Infection     was on antibiotic 500mg BID       Is someone accompanying this pt? no    Is the patient using any DME equipment during OV? no    Depression Screening:  3 most recent PHQ Screens 12/22/2014   Little interest or pleasure in doing things Several days   Feeling down, depressed, irritable, or hopeless Several days   Total Score PHQ 2 2       Learning Assessment:  Learning Assessment 3/8/2019   PRIMARY LEARNER Patient   HIGHEST LEVEL OF EDUCATION - PRIMARY LEARNER  SOME COLLEGE   BARRIERS PRIMARY LEARNER NONE   CO-LEARNER CAREGIVER No   PRIMARY LANGUAGE ENGLISH   LEARNER PREFERENCE PRIMARY LISTENING     READING     DEMONSTRATION   ANSWERED BY self   RELATIONSHIP SELF       Abuse Screening:  Abuse Screening Questionnaire 2/3/2015   Do you ever feel afraid of your partner? N   Are you in a relationship with someone who physically or mentally threatens you? N   Is it safe for you to go home? Y       Fall Risk  No flowsheet data found. Health Maintenance reviewed and discussed and ordered per Provider. Health Maintenance Due   Topic Date Due    Medicare Yearly Exam  03/14/2018    Influenza Age 5 to Adult  08/01/2020   . Coordination of Care:  1. Have you been to the ER, urgent care clinic since your last visit? Hospitalized since your last visit? [facility]  High Blood Presure    2. Have you seen or consulted any other health care providers outside of the [address] [place] [address] since your last visit? Include any pap smears or colon screening.  no

## 2020-08-18 NOTE — PROGRESS NOTES
OFFICE NOTE (SOAP)      8/18/2020  4:00 PM    Chief Complaint   Patient presents with    Follow Up Chronic Condition     blood pressure    Medication Refill    Yeast Infection     was on antibiotic 500mg BID       SUBJECTIVE:    HPI:   La Trujillo is a 38 y.o. female presenting today for office visit. New patient establishing care today, needs med refills and with concern for yeast infection. Previous patient of NP Raina Quintero, last seen 3/18/2020. Wants to establish at this location for convenience as [address] [place] [address] too far to drive. HTN/swelling- new BP issue since after giving birth. Patient has been following closely with Dr Pily Henry who has started and titrated labetalol. Also with significant edema and was taking lasix, transitioned to HCTZ. She reports both swelling and BP have improved with labetalol at 300 and HCTZ 25. She has been referred to cardiology, and appointment pending for September 22. She denies chest pain, shortness of breath or orthopnea. She still has bilateral edema but non pitting and she reports improved. She also has systolic heart murmur heard on exam.     Anemia- anemia noted by dr pacheco and patient supposed to be taking iron supplements- she reports not always compliant due to having to take on empty stomach. Advised patient take first thing in AM when she wakes up. Continue following with Dr. Pily Henry. Herpes- patient with HSV2 infection, she report she takes suppressive therapy daily. Needs refill. Yeast infection- notes yeast infection symptoms since finishing antibiotics per Dr. Pily Henry. Took 1 x diflucan but still with internal vaginal itching. Will send in 1 more diflucan pill. Seasonal allergies- taking loratidine but requesting refill of nasacort as she states this helps with congestion    Bipolar- she follows with psych for bipolar depression, presently taking abilify. No complaints today. Denies depression or SI.      Review of Systems   Constitutional: Negative for chills, fatigue and fever. HENT: Negative for congestion and sore throat. Respiratory: Negative for cough, shortness of breath and wheezing. Cardiovascular: Positive for leg swelling. Negative for chest pain and palpitations. Gastrointestinal: Negative for abdominal pain, diarrhea, nausea and vomiting. Endocrine: Negative for cold intolerance, heat intolerance, polydipsia, polyphagia and polyuria. Genitourinary: Negative for decreased urine volume, difficulty urinating, dysuria, frequency, genital sores, hematuria, pelvic pain, vaginal bleeding, vaginal discharge and vaginal pain. Vaginal itching   Musculoskeletal: Negative for arthralgias and back pain. Neurological: Negative for dizziness, weakness and headaches. Psychiatric/Behavioral: Negative for dysphoric mood.            History  Past Medical History:   Diagnosis Date    Bipolar 1 disorder ([facility] [address]     Borderline diabetes     Genital herpes     Hyperlipidemia     Hyperlipidemia        Past Surgical History:   Procedure Laterality Date    HX  SECTION  2011       Social History     Socioeconomic History    Marital status: SINGLE     Spouse name: Not on file    Number of children: Not on file    Years of education: Not on file    Highest education level: Not on file   Occupational History    Not on file   Social Needs    Financial resource strain: Not on file    Food insecurity     Worry: Not on file     Inability: Not on file    Transportation needs     Medical: Not on file     Non-medical: Not on file   Tobacco Use    Smoking status: Never Smoker    Smokeless tobacco: Never Used   Substance and Sexual Activity    Alcohol use: No     Alcohol/week: 0.0 standard drinks    Drug use: No    Sexual activity: Yes     Partners: Male     Birth control/protection: None   Lifestyle    Physical activity     Days per week: Not on file     Minutes per session: Not on file    Stress: Not on file   Relationships    Social connections     Talks on phone: Not on file     Gets together: Not on file     Attends Latter-day service: Not on file     Active member of club or organization: Not on file     Attends meetings of clubs or organizations: Not on file     Relationship status: Not on file    Intimate partner violence     Fear of current or ex partner: Not on file     Emotionally abused: Not on file     Physically abused: Not on file     Forced sexual activity: Not on file   Other Topics Concern     Service No    Blood Transfusions No    Caffeine Concern Yes     Comment: ocassionally     Occupational Exposure Not Asked    Hobby Hazards Not Asked    Sleep Concern No    Stress Concern No    Weight Concern Yes    Special Diet No    Back Care Not Asked    Exercise No    Bike Helmet Not Asked    Seat Belt Yes    Self-Exams Yes   Social History Narrative    Not on file       Family History   Problem Relation Age of Onset    Hypertension Mother     Heart Disease Father     Heart Surgery Father     Hypertension Father     Diabetes Maternal Aunt     Asthma Sister     Diabetes Brother        No Known Allergies    Current Outpatient Medications   Medication Sig Dispense Refill    labetaloL (NORMODYNE) 300 mg tablet Take 300 mg by mouth two (2) times a day.  hydroCHLOROthiazide (HYDRODIURIL) 25 mg tablet Take 25 mg by mouth daily.  triamcinolone (Nasacort) 55 mcg nasal inhaler 2 Sprays by Both Nostrils route daily. 1 Bottle 1    acyclovir (ZOVIRAX) 400 mg tablet Take 1 Tab by mouth two (2) times a day. 60 Tab 5    MULTIVITAMIN PO Take  by mouth.  ARIPiprazole (ABILIFY) 15 mg tablet Take 20 mg by mouth daily.  FLUoxetine (PROzac) 10 mg capsule Take  by mouth daily.  montelukast (SINGULAIR) 10 mg tablet Take 1 Tab by mouth daily.  30 Tab 5       Patient Care Team:  Patient Care Team:  Adelia Skiff, NP as PCP - General  Adelia Skiff, NP as PCP - [facility] Empaneled Provider  Enio Garcia MD as Physician (Neurology)      LABS:  [facility] (LAB)Resulted: 2020 9:04 PM  [address]  Component Name Value Ref Range   POTASSIUM 3.7 3.5 - 5.5 mmol/L   CHLORIDE 102 98 - 110 mmol/L   CALCIUM IONIZED 4.4 4.4 - 5.4 mg/dL   CO2 23.0 20 - 32 mmol/L   Glucose 98 70 - 99 mg/dL   BUN 5 (L) 6 - 22 mg/dL   CREATININE 0.7 0.5 - 1.2 mg/dL   SODIUM 139 133 - 145 mmol/L   HGB 10.9 (L) 11.7 - 15.5 g/dL   HCT 32.0 (L) 35.1 - 46.5 %   POC-eGFR  >60.0 >60.0    POC-eGFR Non  >60.0 >60.0    Cartridge type CHEM8+           RADIOLOGY:  None new to review    OBJECTIVE:      Physical Exam  Constitutional:       Appearance: She is well-developed. She is obese. HENT:      Head: Normocephalic and atraumatic. Neck:      Musculoskeletal: Normal range of motion. Cardiovascular:      Rate and Rhythm: Normal rate and regular rhythm. Pulses:           Radial pulses are 2+ on the right side and 2+ on the left side. Dorsalis pedis pulses are 1+ on the right side and 1+ on the left side. Heart sounds: Murmur present. Systolic murmur present. Pulmonary:      Effort: Pulmonary effort is normal.      Breath sounds: Normal breath sounds. Musculoskeletal: Normal range of motion. Right lower le+ Edema present. Left lower le+ Edema present. Skin:     General: Skin is warm and dry. Coloration: Skin is not pale. Findings: No erythema or rash. Neurological:      Mental Status: She is alert and oriented to person, place, and time. Psychiatric:         Attention and Perception: Attention normal.         Mood and Affect: Mood normal.         Speech: Speech normal.           Vitals:    20 1548   BP: 127/75   Pulse: 87   Resp: 20   Temp: 98.6 °F (37 °C)   TempSrc: Oral   SpO2: 98%   Weight: 271 lb (122.9 kg)   Height: 5' 4\" (1.626 m)   PainSc:   0 - No pain         Assessment & Plan:    1.  Other secondary hypertension  Continue with labetalol and hctz, follow up with cardiologist as referred    2. Antibiotic-induced yeast infection  - fluconazole (DIFLUCAN) 150 mg tablet; Take 1 Tab by mouth daily for 1 day. FDA advises cautious prescribing of oral fluconazole in pregnancy. Dispense: 1 Tab; Refill: 0    3. HSV-2 infection  - acyclovir (ZOVIRAX) 400 mg tablet; Take 1 Tab by mouth two (2) times a day. Dispense: 60 Tab; Refill: 5    4. Seasonal allergic rhinitis, unspecified trigger  - triamcinolone (Nasacort) 55 mcg nasal inhaler; 2 Sprays by Both Nostrils route daily. Dispense: 1 Bottle; Refill: 1    Follow-up and Dispositions    · Return in about 3 months (around 2020), or if symptoms worsen or fail to improve, for 2-3 months in office, prefers to see Dr. Lucy Gaxiola . Orders Placed This Encounter    labetaloL (NORMODYNE) 300 mg tablet     Sig: Take 300 mg by mouth two (2) times a day.  hydroCHLOROthiazide (HYDRODIURIL) 25 mg tablet     Sig: Take 25 mg by mouth daily.  triamcinolone (Nasacort) 55 mcg nasal inhaler     Si Sprays by Both Nostrils route daily. Dispense:  1 Bottle     Refill:  1    acyclovir (ZOVIRAX) 400 mg tablet     Sig: Take 1 Tab by mouth two (2) times a day. Dispense:  60 Tab     Refill:  5    ferrous sulfate 325 mg (65 mg iron) tablet     Sig: Take 1 Tab by mouth two (2) times a day.  loratadine (CLARITIN) 10 mg tablet     Sig: Take 1 Tab by mouth daily.  pramoxine (PROCTOFOAM) 1 % topical foam     Sig: apply rectally three times a day    fluconazole (DIFLUCAN) 150 mg tablet     Sig: Take 1 Tab by mouth daily for 1 day. FDA advises cautious prescribing of oral fluconazole in pregnancy. Dispense:  1 Tab     Refill:  0             Plan of care reviewed with patient. Patient in agreement with plan and expresses understanding. I have discussed when to anticipate results and how results will be communicated, if applicable.  Anticipatory guidance given and questions answered, patient encouraged to call or RTO if further questions or concerns.     Nemo Alcocer NP  08/18/20

## 2020-10-19 ENCOUNTER — OFFICE VISIT (OUTPATIENT)
Dept: FAMILY MEDICINE CLINIC | Age: 45
End: 2020-10-19
Payer: MEDICAID

## 2020-10-19 ENCOUNTER — TELEPHONE (OUTPATIENT)
Dept: FAMILY MEDICINE CLINIC | Age: 45
End: 2020-10-19

## 2020-10-19 VITALS
BODY MASS INDEX: 49.51 KG/M2 | TEMPERATURE: 98.6 F | HEART RATE: 82 BPM | HEIGHT: 64 IN | WEIGHT: 290 LBS | DIASTOLIC BLOOD PRESSURE: 74 MMHG | OXYGEN SATURATION: 98 % | RESPIRATION RATE: 18 BRPM | SYSTOLIC BLOOD PRESSURE: 123 MMHG

## 2020-10-19 DIAGNOSIS — L73.1 INGROWN HAIR: ICD-10-CM

## 2020-10-19 DIAGNOSIS — F31.9 BIPOLAR 1 DISORDER (HCC): ICD-10-CM

## 2020-10-19 DIAGNOSIS — I10 ESSENTIAL HYPERTENSION: Primary | ICD-10-CM

## 2020-10-19 DIAGNOSIS — Z12.31 ENCOUNTER FOR SCREENING MAMMOGRAM FOR MALIGNANT NEOPLASM OF BREAST: ICD-10-CM

## 2020-10-19 DIAGNOSIS — E66.01 OBESITY, MORBID (HCC): ICD-10-CM

## 2020-10-19 PROCEDURE — 99214 OFFICE O/P EST MOD 30 MIN: CPT | Performed by: FAMILY MEDICINE

## 2020-10-19 RX ORDER — MUPIROCIN 20 MG/G
OINTMENT TOPICAL DAILY
Qty: 22 G | Refills: 0 | Status: SHIPPED | OUTPATIENT
Start: 2020-10-19 | End: 2021-09-13

## 2020-10-19 NOTE — PROGRESS NOTES
Chief Complaint   Patient presents with    Hypertension    Establish Care     1. Have you been to the ER, urgent care clinic since your last visit? Hospitalized since your last visit? No    2. Have you seen or consulted any other health care providers outside of the [address] [place] [address] since your last visit? Include any pap smears or colon screening. No     IVIS Kimbrough comes in for follow-up care. Hypertension: Patient has hypertension. Blood pressure was elevated when she was pregnant. She is 2 months postpartum. She is on labetalol 300 mg twice a day and HCTZ 25 mg daily. Blood pressure is stable. She denies headache, changes in vision or focal weakness. We will continue with the current treatment plan. I will give a printout for the DASH diet. Patient can try this. She would also like to talk to a dietitian in an effort to discuss diets that would help with blood pressure control. Referral is placed. Pedal edema: Patient is noted to have pedal edema while she was pregnant. She has been on HCTZ. Swelling has gone down. We will continue with current treatment plan. Folliculitis: Patient has folliculitis with bumps noted around the groin hair follicles. She does use a warm compress at times and this helps. I will give mupirocin cream to apply in the area. Bipolar disorder: Patient has bipolar disorder. She is followed up by the behavioral health specialist.  She is on Abilify. She will continue with this medication. She is stable on the same. Morbid obesity: Patient has morbid obesity with a BMI of 49.78. She will intensify lifestyle and dietary modification and effort to cut down on her weight. She is also referred to see the dietitian.   Health maintenance: Patient will be referred for mammogram.    Past Medical History  Past Medical History:   Diagnosis Date    Bipolar 1 disorder ([facility] [address]     Borderline diabetes     Genital herpes     Hyperlipidemia     Hyperlipidemia Surgical History  Past Surgical History:   Procedure Laterality Date    HX  SECTION          Medications  Current Outpatient Medications   Medication Sig Dispense Refill    labetaloL (NORMODYNE) 300 mg tablet Take 300 mg by mouth two (2) times a day.  hydroCHLOROthiazide (HYDRODIURIL) 25 mg tablet Take 25 mg by mouth daily.  triamcinolone (Nasacort) 55 mcg nasal inhaler 2 Sprays by Both Nostrils route daily. 1 Bottle 1    acyclovir (ZOVIRAX) 400 mg tablet Take 1 Tab by mouth two (2) times a day. 60 Tab 5    loratadine (CLARITIN) 10 mg tablet Take 1 Tab by mouth daily.  MULTIVITAMIN PO Take  by mouth.  ARIPiprazole (ABILIFY) 15 mg tablet Take 20 mg by mouth daily.  ferrous sulfate 325 mg (65 mg iron) tablet Take 1 Tab by mouth two (2) times a day.       pramoxine (PROCTOFOAM) 1 % topical foam apply rectally three times a day         Allergies  No Known Allergies    Family History  Family History   Problem Relation Age of Onset    Hypertension Mother     Heart Disease Father     Heart Surgery Father     Hypertension Father     Diabetes Maternal Aunt     Asthma Sister     Diabetes Brother        Social History  Social History     Socioeconomic History    Marital status: SINGLE     Spouse name: Not on file    Number of children: Not on file    Years of education: Not on file    Highest education level: Not on file   Occupational History    Not on file   Social Needs    Financial resource strain: Not on file    Food insecurity     Worry: Not on file     Inability: Not on file    Transportation needs     Medical: Not on file     Non-medical: Not on file   Tobacco Use    Smoking status: Never Smoker    Smokeless tobacco: Never Used   Substance and Sexual Activity    Alcohol use: No     Alcohol/week: 0.0 standard drinks    Drug use: No    Sexual activity: Yes     Partners: Male     Birth control/protection: None   Lifestyle    Physical activity     Days per week: Not on file     Minutes per session: Not on file    Stress: Not on file   Relationships    Social connections     Talks on phone: Not on file     Gets together: Not on file     Attends Druze service: Not on file     Active member of club or organization: Not on file     Attends meetings of clubs or organizations: Not on file     Relationship status: Not on file    Intimate partner violence     Fear of current or ex partner: Not on file     Emotionally abused: Not on file     Physically abused: Not on file     Forced sexual activity: Not on file   Other Topics Concern     Service No    Blood Transfusions No    Caffeine Concern Yes     Comment: ocassionally     Occupational Exposure Not Asked    Hobby Hazards Not Asked    Sleep Concern No    Stress Concern No    Weight Concern Yes    Special Diet No    Back Care Not Asked    Exercise No    Bike Helmet Not Asked    Seat Belt Yes    Self-Exams Yes   Social History Narrative    Not on file     Review of Systems  Review of Systems - Review of all systems is negative except as noted above in the HPI.     Vital Signs  Visit Vitals  /74 (BP 1 Location: Left arm, BP Patient Position: Sitting)   Pulse 82   Temp 98.6 °F (37 °C) (Oral)   Resp 18   Ht 5' 4\" (1.626 m)   Wt 290 lb (131.5 kg)   LMP 09/25/2020   SpO2 98%   BMI 49.78 kg/m²         Physical Exam  Physical Examination: General appearance - alert, well appearing, and in no distress, oriented to person, place, and time, overweight, acyanotic, in no respiratory distress and well hydrated  Mental status - alert, oriented to person, place, and time, affect appropriate to mood  Lymphatics - no palpable lymphadenopathy, no hepatosplenomegaly  Chest - clear to auscultation, no wheezes, rales or rhonchi, symmetric air entry  Heart - normal rate and regular rhythm, S1 and S2 normal  Abdomen - no rebound tenderness noted  Back exam - limited range of motion  Neurological - motor and sensory grossly normal bilaterally  Musculoskeletal - full range of motion without pain  Extremities - intact peripheral pulses      Results  Results for orders placed or performed in visit on 32/74/94   METABOLIC PANEL, COMPREHENSIVE   Result Value Ref Range    Glucose 72 65 - 99 mg/dL    BUN 9 6 - 24 mg/dL    Creatinine 0.78 0.57 - 1.00 mg/dL    GFR est non-AA 94 >59 mL/min/1.73    GFR est  >59 mL/min/1.73    BUN/Creatinine ratio 12 9 - 23    Sodium 142 134 - 144 mmol/L    Potassium 4.1 3.5 - 5.2 mmol/L    Chloride 105 96 - 106 mmol/L    CO2 25 20 - 29 mmol/L    Calcium 8.9 8.7 - 10.2 mg/dL    Protein, total 7.0 6.0 - 8.5 g/dL    Albumin 4.0 3.5 - 5.5 g/dL    GLOBULIN, TOTAL 3.0 1.5 - 4.5 g/dL    A-G Ratio 1.3 1.2 - 2.2    Bilirubin, total 0.6 0.0 - 1.2 mg/dL    Alk. phosphatase 65 39 - 117 IU/L    AST (SGOT) 12 0 - 40 IU/L    ALT (SGPT) 11 0 - 32 IU/L   LIPID PANEL   Result Value Ref Range    Cholesterol, total 144 100 - 199 mg/dL    Triglyceride 83 0 - 149 mg/dL    HDL Cholesterol 43 >39 mg/dL    VLDL, calculated 17 5 - 40 mg/dL    LDL, calculated 84 0 - 99 mg/dL   HEMOGLOBIN A1C WITH EAG   Result Value Ref Range    Hemoglobin A1c 5.4 4.8 - 5.6 %    Estimated average glucose 108 mg/dL       ASSESSMENT and PLAN    ICD-10-CM ICD-9-CM    1. Essential hypertension  I10 401.9    2. Ingrown hair  L73.1 704.8 mupirocin (BACTROBAN) 2 % ointment   3. Bipolar 1 disorder (HCC)  F31.9 296.7    4. Obesity, morbid ([address]  E66.01 278.01 REFERRAL TO DIETITIAN   5.  Encounter for screening mammogram for malignant neoplasm of breast  Z12.31 V76.12 [facility] MAMMO BI SCREENING INCL CAD     lab results and schedule of future lab studies reviewed with patient  reviewed diet, exercise and weight control  cardiovascular risk and specific lipid/LDL goals reviewed  reviewed medications and side effects in detail  radiology results and schedule of future radiology studies reviewed with patient      I have discussed the diagnosis with the patient and the intended plan of care as seen in the above orders. The patient has received an after-visit summary and questions were answered concerning future plans. I have discussed medication, side effects, and warnings with the patient in detail. The patient verbalized understanding and is in agreement with the plan of care. The patient will contact the office with any additional concerns. I spent at least 30 minutes on this visit with this established patient. Guido Hurley MD    PLEASE NOTE:   This document has been produced using voice recognition software.  Unrecognized errors in transcription may be present

## 2020-10-19 NOTE — TELEPHONE ENCOUNTER
Patient asking about iron pills that was prescribed by her OB/GYN.  She wants to know if she should still take these and/or have her labs done to check iron

## 2020-10-20 NOTE — TELEPHONE ENCOUNTER
Spoke with patient. Patient states she forgot to mention it at her appointment yesterday. Patient would like the labs placed. Once placed informed patient someone will call her to scheduled lab appointment or advised her to go to outpatient if there is no availability

## 2020-10-20 NOTE — TELEPHONE ENCOUNTER
Patient should continue taking the iron pills until she has had her iron blood work checked.   Sukhjinder Dietrich MD

## 2020-10-21 DIAGNOSIS — I10 ESSENTIAL HYPERTENSION: Primary | ICD-10-CM

## 2020-11-11 ENCOUNTER — LAB ONLY (OUTPATIENT)
Dept: FAMILY MEDICINE CLINIC | Age: 45
End: 2020-11-11
Payer: COMMERCIAL

## 2020-11-11 ENCOUNTER — HOSPITAL ENCOUNTER (OUTPATIENT)
Dept: LAB | Age: 45
Discharge: HOME | End: 2020-11-11
Payer: COMMERCIAL

## 2020-11-11 DIAGNOSIS — I10 ESSENTIAL HYPERTENSION: ICD-10-CM

## 2020-11-11 DIAGNOSIS — E61.1 IRON DEFICIENCY: ICD-10-CM

## 2020-11-11 DIAGNOSIS — Z01.89 ENCOUNTER FOR LABORATORY TEST: Primary | ICD-10-CM

## 2020-11-11 LAB
ALBUMIN SERPL-MCNC: 4.1 G/DL (ref 3.4–5)
ALBUMIN/GLOB SERPL: 1.1 {RATIO} (ref 0.8–1.7)
ALP SERPL-CCNC: 85 U/L (ref 45–117)
ALT SERPL-CCNC: 32 U/L (ref 13–56)
ANION GAP SERPL CALC-SCNC: 7 MMOL/L (ref 3–18)
AST SERPL-CCNC: 22 U/L (ref 10–38)
BASOPHILS # BLD: 0 K/UL (ref 0–0.1)
BASOPHILS NFR BLD: 0 % (ref 0–2)
BILIRUB SERPL-MCNC: 0.9 MG/DL (ref 0.2–1)
BUN SERPL-MCNC: 11 MG/DL (ref 7–18)
BUN/CREAT SERPL: 12 (ref 12–20)
CALCIUM SERPL-MCNC: 9.1 MG/DL (ref 8.5–10.1)
CHLORIDE SERPL-SCNC: 103 MMOL/L (ref 100–111)
CHOLEST SERPL-MCNC: 186 MG/DL
CO2 SERPL-SCNC: 30 MMOL/L (ref 21–32)
CREAT SERPL-MCNC: 0.92 MG/DL (ref 0.6–1.3)
DIFFERENTIAL METHOD BLD: ABNORMAL
EOSINOPHIL # BLD: 0.2 K/UL (ref 0–0.4)
EOSINOPHIL NFR BLD: 3 % (ref 0–5)
ERYTHROCYTE [DISTWIDTH] IN BLOOD BY AUTOMATED COUNT: 13.8 % (ref 11.6–14.5)
FERRITIN SERPL-MCNC: 68 NG/ML (ref 8–388)
GLOBULIN SER CALC-MCNC: 3.9 G/DL (ref 2–4)
GLUCOSE SERPL-MCNC: 71 MG/DL (ref 74–99)
HCT VFR BLD AUTO: 36.8 % (ref 35–45)
HDLC SERPL-MCNC: 46 MG/DL (ref 40–60)
HDLC SERPL: 4 {RATIO} (ref 0–5)
HGB BLD-MCNC: 11.8 G/DL (ref 12–16)
IRON SATN MFR SERPL: 19 % (ref 20–50)
IRON SERPL-MCNC: 64 UG/DL (ref 50–175)
LDLC SERPL CALC-MCNC: 120.8 MG/DL (ref 0–100)
LIPID PROFILE,FLP: ABNORMAL
LYMPHOCYTES # BLD: 1.7 K/UL (ref 0.9–3.6)
LYMPHOCYTES NFR BLD: 33 % (ref 21–52)
MCH RBC QN AUTO: 25.7 PG (ref 24–34)
MCHC RBC AUTO-ENTMCNC: 32.1 G/DL (ref 31–37)
MCV RBC AUTO: 80.2 FL (ref 74–97)
MONOCYTES # BLD: 0.5 K/UL (ref 0.05–1.2)
MONOCYTES NFR BLD: 10 % (ref 3–10)
NEUTS SEG # BLD: 2.8 K/UL (ref 1.8–8)
NEUTS SEG NFR BLD: 54 % (ref 40–73)
PLATELET # BLD AUTO: 388 K/UL (ref 135–420)
PMV BLD AUTO: 9.1 FL (ref 9.2–11.8)
POTASSIUM SERPL-SCNC: 3.7 MMOL/L (ref 3.5–5.5)
PROT SERPL-MCNC: 8 G/DL (ref 6.4–8.2)
RBC # BLD AUTO: 4.59 M/UL (ref 4.2–5.3)
SODIUM SERPL-SCNC: 140 MMOL/L (ref 136–145)
TIBC SERPL-MCNC: 334 UG/DL (ref 250–450)
TRIGL SERPL-MCNC: 96 MG/DL (ref ?–150)
VLDLC SERPL CALC-MCNC: 19.2 MG/DL
WBC # BLD AUTO: 5.2 K/UL (ref 4.6–13.2)

## 2020-11-11 PROCEDURE — 83540 ASSAY OF IRON: CPT

## 2020-11-11 PROCEDURE — 80061 LIPID PANEL: CPT

## 2020-11-11 PROCEDURE — 80053 COMPREHEN METABOLIC PANEL: CPT

## 2020-11-11 PROCEDURE — 82728 ASSAY OF FERRITIN: CPT

## 2020-11-11 PROCEDURE — 85025 COMPLETE CBC W/AUTO DIFF WBC: CPT

## 2020-11-11 PROCEDURE — 36415 COLL VENOUS BLD VENIPUNCTURE: CPT | Performed by: FAMILY MEDICINE

## 2020-11-16 NOTE — PROGRESS NOTES
Please let patient know her LDL cholesterol is elevated at 120. We would want this to go down to below 100. She should exercise and take a diet low in polysaturated fats. Recheck in 3 to 6 months. Rest of her lab results are reassuring.   Karley Brooks MD

## 2020-11-20 ENCOUNTER — TELEPHONE (OUTPATIENT)
Dept: FAMILY MEDICINE CLINIC | Age: 45
End: 2020-11-20

## 2020-11-20 NOTE — TELEPHONE ENCOUNTER
Called patient to advisr of lab results per Dr. Sascha Aguilar. She asked about referral dietition. Was able to give her number to [facility] as referral is already place in chart.   She has a follow up appt with Dr. Sascha Aguilar in January and wanted to keep that for now

## 2020-11-20 NOTE — TELEPHONE ENCOUNTER
----- Message from Brock Zavala MD sent at 11/16/2020  7:50 AM EST -----  Please let patient know her LDL cholesterol is elevated at 120. We would want this to go down to below 100. She should exercise and take a diet low in polysaturated fats. Recheck in 3 to 6 months. Rest of her lab results are reassuring.   Brock Zavala MD

## 2020-12-02 ENCOUNTER — HOSPITAL ENCOUNTER (OUTPATIENT)
Dept: NUTRITION | Age: 45
Discharge: HOME | End: 2020-12-02
Payer: MEDICAID

## 2020-12-02 PROCEDURE — 97802 MEDICAL NUTRITION INDIV IN: CPT

## 2020-12-04 NOTE — PROGRESS NOTES
[address] [place] [address]     Nutrition Assessment  Medical Nutrition Therapy   Outpatient Initial Evaluation         Patient Name: Jacey Perdue : 1975   Treatment Diagnosis: Obesity  Elevated LDL   Referral Source: James Lakhani MD [place] [facility]): 2020     Gender: female Age: 41 y.o. Ht: 5'4\" in Wt: 293 lb  kg   BMI: 52 BMR   Male  BMR Female 1965     Past Medical History:  Obesity     Pertinent Medications:        Biochemical Data:   Lab Results   Component Value Date/Time    Hemoglobin A1c 5.4 04/15/2019 03:24 AM     Lab Results   Component Value Date/Time    Sodium 140 2020 12:24 PM    Potassium 3.7 2020 12:24 PM    Chloride 103 2020 12:24 PM    CO2 30 2020 12:24 PM    Anion gap 7 2020 12:24 PM    Glucose 71 (L) 2020 12:24 PM    BUN 11 2020 12:24 PM    Creatinine 0.92 2020 12:24 PM    BUN/Creatinine ratio 12 2020 12:24 PM    GFR est AA >60 2020 12:24 PM    GFR est non-AA >60 2020 12:24 PM    Calcium 9.1 2020 12:24 PM    Bilirubin, total 0.9 2020 12:24 PM    Alk. phosphatase 85 2020 12:24 PM    Protein, total 8.0 2020 12:24 PM    Albumin 4.1 2020 12:24 PM    Globulin 3.9 2020 12:24 PM    A-G Ratio 1.1 2020 12:24 PM    ALT (SGPT) 32 2020 12:24 PM    AST (SGOT) 22 2020 12:24 PM     Lab Results   Component Value Date/Time    Cholesterol, total 186 2020 12:24 PM    HDL Cholesterol 46 2020 12:24 PM    LDL, calculated 120.8 (H) 2020 12:24 PM    VLDL, calculated 19.2 2020 12:24 PM    Triglyceride 96 2020 12:24 PM    CHOL/HDL Ratio 4.0 2020 12:24 PM     Lab Results   Component Value Date/Time    ALT (SGPT) 32 2020 12:24 PM    AST (SGOT) 22 2020 12:24 PM    Alk.  phosphatase 85 2020 12:24 PM    Bilirubin, total 0.9 2020 12:24 PM     Lab Results   Component Value Date/Time    Creatinine 0.92 11/11/2020 12:24 PM     Lab Results   Component Value Date/Time    BUN 11 11/11/2020 12:24 PM     No results found for: MCACR, MCA1, MCA2, MCA3, MCAU, MCAU2, MCALPOCT     Assessment:   Patient is a  41year old female who visits RD for insight on how to lose weight. Patient has a 2 month old and a 5year old. She also cares for her mother who in not very mobile. Patient's sleeping habits consists need much improvement. Food & Nutrition: Patient eats 2-3 meals a day consisting of starch, protein and sometimes vegetables. Choices may include cheese pizza, chicken salad sandwich and chips. Patient consumes liquid calories consisting of ginger ale. Estimate Needs   Calories: 1600 Protein: 100-133 Carbs: <144 Fat: 65   Kcal/day  g/day  g/day  g/day                            Nutrition Diagnosis    Overweight/obesity related to excessive energy intake as evidenced by a BMI of  49. Nutrition Intervention &  Education: Encouraged pt to drink only calorie free or very low calorie/carb beverages only. Educated pt on all carbohydrates found in foods and encouraged no more than 35-40 gm/meal and 15-20 gm/snack. Patient was educated on the importance of making lifestyle changes such as:  1. Eating off a smaller plate and drinking from smaller glasses. 2. Increasing activity. 3. Menu planning and tracking. Recommend My Fitness Pal for tracking. 4. The importance of eating breakfast.  5. Appropriate snacks. 6. Portion control. 7. The importance of good sleeping habits. Handouts Provided: [x]  Carbohydrates  [x]  Protein  [x]  Non-starchy Vegatbles  [x]  Food Label  [x]  Meal and Snack Ideas  [x]  Food Journals []  Diabetes  []  Cholesterol  []  Sodium  [x]  Gen Nutr Guidelines  []  SBGM Guidelines  []  Others:   Information Reviewed with: Patient.    Readiness to Change Stage:   []  Pre-contemplative    []  Contemplative  []  Preparation               [x]  Action                  []  Maintenance Potential Barriers to Learning: []  Decline in memory    []  Language barrier   []  Other:  []  Emotional                  []  Limited mobility  []  Lack of motivation     [] Vision, hearing or cognitive impairment   Expected Compliance: Good. Nutritional Goal - To promote lifestyle changes to result in:    [x]  Weight loss  []  Improved diabetic control. []  Decreased cholesterol levels  []  Decreased blood pressure  []  Weight maintenance []  Preventing any interactions associated with food allergies  []  Adequate weight gain toward goal weight  [x]  Other:   Lower LDL. Patient Goals:   Patient desires to lose weight and make better food choices. Dietitian Signature:  Redmond Kussmaul, RD Date: 12/02/2020   Follow-up: Scheduled:  01/13/2021  3:00 pm Time: 3:43 PM

## 2021-01-08 RX ORDER — LABETALOL 300 MG/1
300 TABLET, FILM COATED ORAL 2 TIMES DAILY
Qty: 180 TAB | Refills: 1 | Status: SHIPPED | OUTPATIENT
Start: 2021-01-08 | End: 2021-05-10

## 2021-01-08 RX ORDER — HYDROCHLOROTHIAZIDE 25 MG/1
25 TABLET ORAL DAILY
Qty: 90 TAB | Refills: 1 | Status: SHIPPED | OUTPATIENT
Start: 2021-01-08 | End: 2021-06-28

## 2021-01-08 NOTE — TELEPHONE ENCOUNTER
Requested Prescriptions     Pending Prescriptions Disp Refills    labetaloL (NORMODYNE) 300 mg tablet       Sig: Take 1 Tab by mouth two (2) times a day.  hydroCHLOROthiazide (HYDRODIURIL) 25 mg tablet       Sig: Take 1 Tab by mouth daily.      Patient requesting the following medication refill       Date last prescribed: historical provider     Date patient last seen:  10/19/20    Follow up appointment scheduled: 01/18/21    Please Advise

## 2021-01-08 NOTE — TELEPHONE ENCOUNTER
Requested Prescriptions     Pending Prescriptions Disp Refills    labetaloL (NORMODYNE) 300 mg tablet        Sig: Take 1 Tab by mouth two (2) times a day.  hydroCHLOROthiazide (HYDRODIURIL) 25 mg tablet        Sig: Take 1 Tab by mouth daily.

## 2021-01-13 ENCOUNTER — HOSPITAL ENCOUNTER (OUTPATIENT)
Dept: NUTRITION | Age: 46
End: 2021-01-13

## 2021-01-18 ENCOUNTER — VIRTUAL VISIT (OUTPATIENT)
Dept: FAMILY MEDICINE CLINIC | Age: 46
End: 2021-01-18
Payer: MEDICAID

## 2021-01-18 DIAGNOSIS — I10 ESSENTIAL HYPERTENSION: ICD-10-CM

## 2021-01-18 DIAGNOSIS — J30.2 SEASONAL ALLERGIC RHINITIS, UNSPECIFIED TRIGGER: ICD-10-CM

## 2021-01-18 DIAGNOSIS — E66.01 OBESITY, MORBID (HCC): ICD-10-CM

## 2021-01-18 DIAGNOSIS — U07.1 COVID-19: Primary | ICD-10-CM

## 2021-01-18 DIAGNOSIS — F31.9 BIPOLAR 1 DISORDER (HCC): ICD-10-CM

## 2021-01-18 PROCEDURE — 99214 OFFICE O/P EST MOD 30 MIN: CPT | Performed by: FAMILY MEDICINE

## 2021-01-18 RX ORDER — AZITHROMYCIN 250 MG/1
TABLET, FILM COATED ORAL
Qty: 6 TAB | Refills: 0 | Status: SHIPPED | OUTPATIENT
Start: 2021-01-18 | End: 2021-01-23

## 2021-01-18 RX ORDER — LORATADINE 10 MG/1
10 TABLET ORAL DAILY
Qty: 90 TAB | Refills: 1 | Status: SHIPPED | OUTPATIENT
Start: 2021-01-18 | End: 2021-03-10

## 2021-01-18 RX ORDER — TRIAMCINOLONE ACETONIDE 55 UG/1
2 SPRAY, METERED NASAL DAILY
Qty: 1 BOTTLE | Refills: 1 | Status: SHIPPED | OUTPATIENT
Start: 2021-01-18 | End: 2021-03-10 | Stop reason: ALTCHOICE

## 2021-01-18 NOTE — TELEPHONE ENCOUNTER
Requested Prescriptions     Pending Prescriptions Disp Refills    triamcinolone (Nasacort) 55 mcg nasal inhaler 1 Bottle 1     Si Sprays by Both Nostrils route daily.

## 2021-01-18 NOTE — PROGRESS NOTES
Eber Persaud is a 41 y.o. female who was seen by synchronous (real-time) audio-video technology on 1/18/2021 for Cold Symptoms and Positive For Covid-19        Assessment & Plan:       ICD-10-CM ICD-9-CM    1. COVID-19  U07.1 079.89 azithromycin (ZITHROMAX) 250 mg tablet   2. Seasonal allergic rhinitis, unspecified trigger  J30.2 477.9 loratadine (CLARITIN) 10 mg tablet   3. Essential hypertension  I10 401.9    4. Bipolar 1 disorder (HCC)  F31.9 296.7    5. Obesity, morbid ([facility] [address]  E66.01 278.01      Subjective:   Eber Persaud is seen for follow-up care. COVID-19 positive: Patient has fever and chills. Had nasal congestion and chest tightness. She was seen in urgent care and had a Covid 19 test done that was positive. She continues to have fever and chills. She does have generalized malaise and fatigue and still has nasal congestion. No cough, wheeze, shortness of breath or loss of taste and smell. Given the nasal congestion I will send in Claritin to take daily. She has used Nasacort and this has helped somewhat though she is did sneeze and this resulted in some slight epistaxis. I will also send in azithromycin. I will follow-up as needed. Patient is advised to go to the emergency room in case symptoms worsen or there is no improvement. Hypertension: Patient has hypertension. She is on HCTZ and labetalol. Has been stable on these medications. Denies headache, changes in vision or focal weakness. We will continue with the current treatment plan. Mood disorder: Patient has mood disorder. She is followed up by the behavioral health specialist.  She is on Abilify. Tolerating the medication. We will continue current treatment plan. Morbid obesity: Patient has a BMI of 49.78. She will intensify lifestyle and dietary modification effort to cut down on her weight. Prior to Admission medications    Medication Sig Start Date End Date Taking?  Authorizing Provider   labetaloL (NORMODYNE) 300 mg tablet Take 1 Tab by mouth two (2) times a day. 1/8/21   August Davey MD   hydroCHLOROthiazide (HYDRODIURIL) 25 mg tablet Take 1 Tab by mouth daily. 1/8/21   August Davey MD   mupirocin (BACTROBAN) 2 % ointment Apply  to affected area daily. 10/19/20   August Davey MD   triamcinolone (Nasacort) 55 mcg nasal inhaler 2 Sprays by Both Nostrils route daily. 8/18/20   Alley Hopping, NP   acyclovir (ZOVIRAX) 400 mg tablet Take 1 Tab by mouth two (2) times a day. 8/18/20   Alley Hopping, NP   ferrous sulfate 325 mg (65 mg iron) tablet Take 1 Tab by mouth two (2) times a day. 7/30/20   Provider, Historical   loratadine (CLARITIN) 10 mg tablet Take 1 Tab by mouth daily. 7/2/20   Provider, Historical   pramoxine (PROCTOFOAM) 1 % topical foam apply rectally three times a day 7/17/20   Provider, Historical   MULTIVITAMIN PO Take  by mouth. Provider, Historical   ARIPiprazole (ABILIFY) 15 mg tablet Take 20 mg by mouth daily. Provider, Historical     ROS Review of all systems is negative except as noted above in the HPI. Objective:   No flowsheet data found. Constitutional: [x] No apparent distress     Mental status: [x] Alert and awake  [x] Oriented to person/place/time [x] Able to follow commands     HENT: [x] Normocephalic, atraumatic  [x] Mouth/Throat: Mucous membranes are moist    Pulmonary/Chest: [x] Respiratory effort normal   [x] No visualized signs of difficulty breathing or respiratory distress           Neurological:        [x] No Facial Asymmetry (Cranial nerve 7 motor function) (limited exam due to video visit)                     Psychiatric:       [x] Normal Affect    We discussed the expected course, resolution and complications of the diagnosis(es) in detail. Medication risks, benefits, costs, interactions, and alternatives were discussed as indicated. I advised her to contact the office if her condition worsens, changes or fails to improve as anticipated.  She expressed understanding with the diagnosis(es) and plan. Piyush Holder, who was evaluated through a patient-initiated, synchronous (real-time) audio-video encounter, and/or her healthcare decision maker, is aware that it is a billable service, with coverage as determined by her insurance carrier. She provided verbal consent to proceed: Yes, and patient identification was verified. It was conducted pursuant to the emergency declaration under the [address] [place] [address] and the [employer] General Act. A caregiver was present when appropriate. Ability to conduct physical exam was limited. I was in the office. The patient was at home.       Dinorah Silverman MD

## 2021-01-20 ENCOUNTER — HOSPITAL ENCOUNTER (OUTPATIENT)
Dept: NUTRITION | Age: 46
End: 2021-01-20

## 2021-01-22 ENCOUNTER — TELEPHONE (OUTPATIENT)
Dept: FAMILY MEDICINE CLINIC | Age: 46
End: 2021-01-22

## 2021-01-22 DIAGNOSIS — R05.9 COUGH: Primary | ICD-10-CM

## 2021-01-22 RX ORDER — BENZONATATE 200 MG/1
200 CAPSULE ORAL
Qty: 30 CAP | Refills: 0 | Status: SHIPPED | OUTPATIENT
Start: 2021-01-22 | End: 2021-06-14

## 2021-01-22 NOTE — TELEPHONE ENCOUNTER
Spoke with patient at this time. Patient is covid + and now has developed a cough. When patient had her virtual visit she had not develop a cough. Patient states she has tried coricidin with no improvement. Patient has htn and requesting cough medication prescribed to help with symptoms. Informed patient PCP is not in the office request may not be approved until Monday but I will request to JULIANO Amos

## 2021-01-27 ENCOUNTER — TELEPHONE (OUTPATIENT)
Dept: FAMILY MEDICINE CLINIC | Age: 46
End: 2021-01-27

## 2021-01-27 NOTE — TELEPHONE ENCOUNTER
Pt is requesting to be contacted by the nurse when available. Ms Zenaida Nixon has a question concerning the Zpac that Dr Chey Wells prescribed to her. She want to know if she should be taking proactive afterwards.  Please contact when available

## 2021-01-28 DIAGNOSIS — U07.1 COVID-19: Primary | ICD-10-CM

## 2021-01-28 NOTE — TELEPHONE ENCOUNTER
Spoke with patient at this time. Patient is requesting to be retested at this time. Informed patient retesting in 2 weeks may be too soon and she should at least wait 4 weeks for retesting. Patient is requesting orders to be place for covid testing so when she go in 4 weeks the order will be already place. Please place order if agreeable

## 2021-02-10 ENCOUNTER — APPOINTMENT (OUTPATIENT)
Dept: NUTRITION | Age: 46
End: 2021-02-10

## 2021-02-16 ENCOUNTER — CLINICAL SUPPORT (OUTPATIENT)
Dept: FAMILY MEDICINE CLINIC | Age: 46
End: 2021-02-16

## 2021-02-16 DIAGNOSIS — U07.1 CLINICAL DIAGNOSIS OF SEVERE ACUTE RESPIRATORY SYNDROME CORONAVIRUS 2 (SARS-COV-2) DISEASE: Primary | ICD-10-CM

## 2021-02-19 ENCOUNTER — TELEPHONE (OUTPATIENT)
Dept: FAMILY MEDICINE CLINIC | Age: 46
End: 2021-02-19

## 2021-02-19 LAB — SARS-COV-2, COV2NT: DETECTED

## 2021-02-19 NOTE — TELEPHONE ENCOUNTER
Patient called asking about COVID test results-advised patient that she was positive and she needs to quarantine for 10-14 days depending on symptoms.   Patient verbalized understanding

## 2021-02-19 NOTE — TELEPHONE ENCOUNTER
Kim Heart from [facility] is calling to inform of a positive COVID results for this pt.  Please be advised

## 2021-02-21 NOTE — PROGRESS NOTES
Please let patient know COVID-19 test report is positive. She should do supportive care including taking Tylenol for fever. She is currently 10 days. She gets shortness of breath go to the emergency room for evaluation.   Cynthia Mcgregor MD

## 2021-02-22 NOTE — PROGRESS NOTES
Spoke with patient. Patient was given results. Patient verbally stated understanding. Patient didn't have any questions or concerns.

## 2021-02-24 ENCOUNTER — HOSPITAL ENCOUNTER (OUTPATIENT)
Dept: NUTRITION | Age: 46
End: 2021-02-24

## 2021-03-10 ENCOUNTER — VIRTUAL VISIT (OUTPATIENT)
Dept: FAMILY MEDICINE CLINIC | Age: 46
End: 2021-03-10
Payer: MEDICAID

## 2021-03-10 DIAGNOSIS — J30.9 ALLERGIC RHINITIS, UNSPECIFIED SEASONALITY, UNSPECIFIED TRIGGER: Primary | ICD-10-CM

## 2021-03-10 DIAGNOSIS — J02.9 PHARYNGITIS, UNSPECIFIED ETIOLOGY: ICD-10-CM

## 2021-03-10 DIAGNOSIS — I10 ESSENTIAL HYPERTENSION: ICD-10-CM

## 2021-03-10 DIAGNOSIS — U07.1 COVID-19: ICD-10-CM

## 2021-03-10 PROCEDURE — 99213 OFFICE O/P EST LOW 20 MIN: CPT | Performed by: FAMILY MEDICINE

## 2021-03-10 RX ORDER — LEVOCETIRIZINE DIHYDROCHLORIDE 5 MG/1
5 TABLET, FILM COATED ORAL
Qty: 30 TAB | Refills: 2 | Status: SHIPPED | OUTPATIENT
Start: 2021-03-10 | End: 2021-07-07

## 2021-03-10 RX ORDER — FLUTICASONE PROPIONATE 50 MCG
2 SPRAY, SUSPENSION (ML) NASAL DAILY
Qty: 1 BOTTLE | Refills: 0 | Status: SHIPPED | OUTPATIENT
Start: 2021-03-10 | End: 2022-01-13 | Stop reason: SDUPTHER

## 2021-03-10 NOTE — PROGRESS NOTES
Chief Complaint   Patient presents with    Nasal Congestion     sneezing, sore throat, coughing      1. Have you been to the ER, urgent care clinic since your last visit? Hospitalized since your last visit? No    2. Have you seen or consulted any other health care providers outside of the [address] [place] [address] since your last visit? Include any pap smears or colon screening. Brenda Couch is a 41 y.o. female who was seen by synchronous (real-time) audio-video technology on 3/10/2021 for Nasal Congestion (sneezing, sore throat, coughing )        Assessment & Plan:       ICD-10-CM ICD-9-CM    1. Allergic rhinitis, unspecified seasonality, unspecified trigger  J30.9 477.9 levocetirizine (XYZAL) 5 mg tablet      fluticasone propionate (FLONASE) 50 mcg/actuation nasal spray   2. Pharyngitis, unspecified etiology  J02.9 462    3. COVID-19  U07.1 079.89    4. Essential hypertension  I10 401.9      Subjective:   Lalit Couch is seen for acute care. Nasal congestion: Patient has nasal congestion and sneezing. She has clear postnasal drainage. This has been ongoing for weeks. She has taken Nasacort and Claritin but these are not helping. In the past Flonase did better for her. She would like to take different medication. I will send in Flonase and also send in a prescription for Xyzal.  We will follow-up at next visit. Pharyngitis: Patient has sore throat and mild odynophagia. She was seen previously and is currently on amoxicillin. She has taken 3 days of the medication. Still has 4 more days of the same. She will continue with the antibiotic completion. Patient has an occasional cough that is nonproductive. Denies chest pain, fever, chills or weakness. Hypertension: Patient has hypertension. Blood pressure has been stable. She denies headache, changes in vision or focal weakness. Patient takes labetalol and HCTZ. Continue current treatment plan.   COVID-19: Patient had COVID-19 testing done that was positive. This was about 3 weeks ago. She did self quarantine and supportive care. Currently no fever or chills. She feels stable. Prior to Admission medications    Medication Sig Start Date End Date Taking? Authorizing Provider   triamcinolone (Nasacort) 55 mcg nasal inhaler 2 Sprays by Both Nostrils route daily. 1/18/21  Yes August Davey MD   labetaloL (NORMODYNE) 300 mg tablet Take 1 Tab by mouth two (2) times a day. 1/8/21  Yes Mary Macdonald MD   hydroCHLOROthiazide (HYDRODIURIL) 25 mg tablet Take 1 Tab by mouth daily. 1/8/21  Yes August Davey MD   acyclovir (ZOVIRAX) 400 mg tablet Take 1 Tab by mouth two (2) times a day. 8/18/20  Yes Maureen Goodson NP   MULTIVITAMIN PO Take  by mouth. Yes Provider, Historical   ARIPiprazole (ABILIFY) 15 mg tablet Take 20 mg by mouth daily. Yes Provider, Historical   benzonatate (TESSALON) 200 mg capsule Take 1 Cap by mouth three (3) times daily as needed for Cough. 1/22/21   Katiuska Amos NP   loratadine (CLARITIN) 10 mg tablet Take 1 Tab by mouth daily. Take 1 Tab by mouth daily. 1/18/21   August Davey MD   mupirocin (BACTROBAN) 2 % ointment Apply  to affected area daily. 10/19/20   August Davey MD   ferrous sulfate 325 mg (65 mg iron) tablet Take 1 Tab by mouth two (2) times a day. 7/30/20   Provider, Historical   pramoxine (PROCTOFOAM) 1 % topical foam apply rectally three times a day 7/17/20   Provider, Historical     ROS Review of all systems is negative except as noted above in the HPI.     Objective:     Patient-Reported Vitals 3/10/2021   Patient-Reported LMP 02/20/21     Constitutional: [x] Appears well-developed and well-nourished [x] No apparent distress     Mental status: [x] Alert and awake  [x] Oriented to person/place/time [x] Able to follow commands     Eyes:   EOM    [x]  Normal        HENT: [x] Normocephalic, atraumatic    [x] Mouth/Throat: Mucous membranes are moist    Pulmonary/Chest: [x] Respiratory effort normal   [x] No visualized signs of difficulty breathing or respiratory distress            Neurological:        [x] No Facial Asymmetry (Cranial nerve 7 motor function) (limited exam due to video visit)                     Psychiatric:       [x] Normal Affect    We discussed the expected course, resolution and complications of the diagnosis(es) in detail. Medication risks, benefits, costs, interactions, and alternatives were discussed as indicated. I advised her to contact the office if her condition worsens, changes or fails to improve as anticipated. She expressed understanding with the diagnosis(es) and plan. Yuli Vazquez, was evaluated through a synchronous (real-time) audio-video encounter. The patient (or guardian if applicable) is aware that this is a billable service. Verbal consent to proceed has been obtained within the past 12 months. The visit was conducted pursuant to the emergency declaration under the [address] [place] [address], [address] [place] [address] authority and the [employer] General Act. Patient identification was verified, and a caregiver was present when appropriate. The patient was located in a state where the provider was credentialed to provide care.       Star Cosme MD

## 2021-03-17 ENCOUNTER — HOSPITAL ENCOUNTER (OUTPATIENT)
Dept: MAMMOGRAPHY | Age: 46
Discharge: HOME | End: 2021-03-17
Attending: FAMILY MEDICINE
Payer: MEDICAID

## 2021-03-17 DIAGNOSIS — Z12.31 ENCOUNTER FOR SCREENING MAMMOGRAM FOR MALIGNANT NEOPLASM OF BREAST: ICD-10-CM

## 2021-03-17 PROCEDURE — 77067 SCR MAMMO BI INCL CAD: CPT

## 2021-05-06 ENCOUNTER — TELEPHONE (OUTPATIENT)
Dept: FAMILY MEDICINE CLINIC | Age: 46
End: 2021-05-06

## 2021-05-06 NOTE — TELEPHONE ENCOUNTER
----- Message from Hardeep Sadler sent at 5/6/2021  1:16 PM EDT -----  Regarding: dermatologist  Pt has an appt on 5/18 with Dr. Rishi Sawyer, but is inquiring as to whether there is a dermatologist  she can be referred to.  Please  call pt at      [phone]

## 2021-05-10 RX ORDER — LABETALOL 300 MG/1
TABLET, FILM COATED ORAL
Qty: 180 TAB | Refills: 1 | Status: SHIPPED | OUTPATIENT
Start: 2021-05-10 | End: 2021-11-07

## 2021-05-11 ENCOUNTER — OFFICE VISIT (OUTPATIENT)
Dept: FAMILY MEDICINE CLINIC | Age: 46
End: 2021-05-11
Payer: MEDICAID

## 2021-05-11 VITALS
WEIGHT: 293 LBS | HEIGHT: 64 IN | HEART RATE: 86 BPM | RESPIRATION RATE: 18 BRPM | BODY MASS INDEX: 50.02 KG/M2 | SYSTOLIC BLOOD PRESSURE: 136 MMHG | DIASTOLIC BLOOD PRESSURE: 77 MMHG | OXYGEN SATURATION: 100 % | TEMPERATURE: 99 F

## 2021-05-11 DIAGNOSIS — J30.9 ALLERGIC RHINITIS, UNSPECIFIED SEASONALITY, UNSPECIFIED TRIGGER: ICD-10-CM

## 2021-05-11 DIAGNOSIS — F31.9 BIPOLAR 1 DISORDER (HCC): ICD-10-CM

## 2021-05-11 DIAGNOSIS — E66.01 MORBID OBESITY (HCC): ICD-10-CM

## 2021-05-11 DIAGNOSIS — L30.9 DERMATITIS: ICD-10-CM

## 2021-05-11 DIAGNOSIS — I10 ESSENTIAL HYPERTENSION: Primary | ICD-10-CM

## 2021-05-11 PROCEDURE — 99214 OFFICE O/P EST MOD 30 MIN: CPT | Performed by: FAMILY MEDICINE

## 2021-05-11 RX ORDER — PHENTERMINE HYDROCHLORIDE 37.5 MG/1
37.5 TABLET ORAL
Qty: 30 TAB | Refills: 0 | Status: SHIPPED | OUTPATIENT
Start: 2021-05-11 | End: 2021-06-10

## 2021-05-11 RX ORDER — CLOTRIMAZOLE AND BETAMETHASONE DIPROPIONATE 10; .64 MG/G; MG/G
CREAM TOPICAL
Qty: 45 G | Refills: 0 | Status: SHIPPED | OUTPATIENT
Start: 2021-05-11 | End: 2021-05-25

## 2021-05-11 NOTE — PROGRESS NOTES
IVIS Barrettelor comes in for f/u care. HTN; patient has hypertension. Blood pressure is stable. She is on labetalol and HCTZ. Stable on medication. Denies headache, changes in vision or focal weakness. Mood disorder: Patient has a history of bipolar disorder. She is followed up by the behavioral health specialist.  She is on Abilify. She feels stressed out lately. Her mom  a few months ago. Patient states that she has been asked to move out from the house where she lives with her kids. Previously the house was rented to her mom. This is stressing her even more. She will continue with her medication. She should follow-up with her behavioral health specialist.   Morbid obesity: Patient has morbid obesity. She has been doing lifestyle and dietary modification. Weight has gone up. She has gained more than 30 pounds since August last year. She is not doing much of lifestyle and dietary modification. She states that in the past she was on phentermine and this helped with weight loss. She would like to try this medication again. I will give a prescription. I will have her come back in 1 month for weight check. She will intensify lifestyle and dietary modification. Skin: Patient has fungal dermatitis lower abdomen. In the past she has used nystatin powder with some relief. Currently this is not helping much. She would prefer to use a cream.  I will give Lotrisone cream.  I will follow-up at next visit. Nasal congestion: Patient has nasal congestion with sneezing especially during pollen season. She is on Singulair and Flonase. We will continue current treatment plan. She denies fever, chills or postnasal drainage.       Past Medical History  Past Medical History:   Diagnosis Date    Bipolar 1 disorder ([address]     Borderline diabetes     Genital herpes     Hyperlipidemia     Hyperlipidemia        Surgical History  Past Surgical History:   Procedure Laterality Date    HX  SECTION  2011        Medications  Current Outpatient Medications   Medication Sig Dispense Refill    labetaloL (NORMODYNE) 300 mg tablet take 1 tablet by mouth twice a day 180 Tab 1    levocetirizine (XYZAL) 5 mg tablet Take 1 Tab by mouth daily as needed for Allergies. 30 Tab 2    hydroCHLOROthiazide (HYDRODIURIL) 25 mg tablet Take 1 Tab by mouth daily. 90 Tab 1    acyclovir (ZOVIRAX) 400 mg tablet Take 1 Tab by mouth two (2) times a day. 60 Tab 5    MULTIVITAMIN PO Take  by mouth.  ARIPiprazole (ABILIFY) 15 mg tablet Take 20 mg by mouth daily.  fluticasone propionate (FLONASE) 50 mcg/actuation nasal spray 2 Sprays by Both Nostrils route daily. 1 Bottle 0    benzonatate (TESSALON) 200 mg capsule Take 1 Cap by mouth three (3) times daily as needed for Cough. 30 Cap 0    mupirocin (BACTROBAN) 2 % ointment Apply  to affected area daily. 22 g 0    ferrous sulfate 325 mg (65 mg iron) tablet Take 1 Tab by mouth two (2) times a day.       pramoxine (PROCTOFOAM) 1 % topical foam apply rectally three times a day         Allergies  No Known Allergies    Family History  Family History   Problem Relation Age of Onset    Hypertension Mother     Heart Disease Father     Heart Surgery Father     Hypertension Father     Diabetes Maternal Aunt     Asthma Sister     Diabetes Brother        Social History  Social History     Socioeconomic History    Marital status: SINGLE     Spouse name: Not on file    Number of children: Not on file    Years of education: Not on file    Highest education level: Not on file   Occupational History    Not on file   Social Needs    Financial resource strain: Not on file    Food insecurity     Worry: Not on file     Inability: Not on file    Transportation needs     Medical: Not on file     Non-medical: Not on file   Tobacco Use    Smoking status: Never Smoker    Smokeless tobacco: Never Used   Substance and Sexual Activity    Alcohol use: No     Alcohol/week: 0.0 standard drinks    Drug use: No    Sexual activity: Yes     Partners: Male     Birth control/protection: None   Lifestyle    Physical activity     Days per week: Not on file     Minutes per session: Not on file    Stress: Not on file   Relationships    Social connections     Talks on phone: Not on file     Gets together: Not on file     Attends Sabianism service: Not on file     Active member of club or organization: Not on file     Attends meetings of clubs or organizations: Not on file     Relationship status: Not on file    Intimate partner violence     Fear of current or ex partner: Not on file     Emotionally abused: Not on file     Physically abused: Not on file     Forced sexual activity: Not on file   Other Topics Concern     Service No    Blood Transfusions No    Caffeine Concern Yes     Comment: ocassionally     Occupational Exposure Not Asked    Hobby Hazards Not Asked    Sleep Concern No    Stress Concern No    Weight Concern Yes    Special Diet No    Back Care Not Asked    Exercise No    Bike Helmet Not Asked    Seat Belt Yes    Self-Exams Yes   Social History Narrative    Not on file       Review of Systems  Review of Systems - Review of all systems is negative except as noted above in the HPI.     Vital Signs  Visit Vitals  /77 (BP 1 Location: Left upper arm, BP Patient Position: Sitting, BP Cuff Size: Adult)   Pulse 86   Temp 99 °F (37.2 °C) (Oral)   Resp 18   Ht 5' 4\" (1.626 m)   Wt 302 lb (137 kg)   LMP 04/20/2021   SpO2 100%   BMI 51.84 kg/m²         Physical Exam  Physical Examination: General appearance - alert, well appearing, and in no distress, overweight, acyanotic, in no respiratory distress and well hydrated  Mental status - alert, oriented to person, place, and time, affect appropriate to mood  Lymphatics - no palpable lymphadenopathy  Chest - no tachypnea, retractions or cyanosis  Heart - S1 and S2 normal  Abdomen - no rebound tenderness noted  Back exam - limited range of motion  Neurological - neck supple without rigidity  Extremities - intact peripheral pulses      Results  Results for orders placed or performed in visit on 02/16/21   NOVEL CORONAVIRUS (COVID-19)   Result Value Ref Range    SARS-CoV-2 DETECTED (AA) Not Detect       ASSESSMENT and PLAN    ICD-10-CM ICD-9-CM    1. Essential hypertension  I10 401.9    2. Dermatitis  L30.9 692.9 clotrimazole-betamethasone (LOTRISONE) topical cream   3. Morbid obesity ([facility])  E66.01 278.01 phentermine (ADIPEX-P) 37.5 mg tablet   4. Bipolar 1 disorder ([facility])  F31.9 296.7    5. Allergic rhinitis, unspecified seasonality, unspecified trigger  J30.9 477.9      lab results and schedule of future lab studies reviewed with patient  reviewed diet, exercise and weight control  cardiovascular risk and specific lipid/LDL goals reviewed  reviewed medications and side effects in detail      I have discussed the diagnosis with the patient and the intended plan of care as seen in the above orders. The patient has received an after-visit summary and questions were answered concerning future plans. I have discussed medication, side effects, and warnings with the patient in detail. The patient verbalized understanding and is in agreement with the plan of care. The patient will contact the office with any additional concerns. Florencio Goldberg MD    PLEASE NOTE:   This document has been produced using voice recognition software.  Unrecognized errors in transcription may be present

## 2021-06-09 DIAGNOSIS — E66.01 MORBID OBESITY (HCC): ICD-10-CM

## 2021-06-10 RX ORDER — PHENTERMINE HYDROCHLORIDE 37.5 MG/1
TABLET ORAL
Qty: 30 TABLET | Refills: 0 | Status: SHIPPED | OUTPATIENT
Start: 2021-06-10 | End: 2021-07-13 | Stop reason: SDUPTHER

## 2021-06-14 ENCOUNTER — OFFICE VISIT (OUTPATIENT)
Dept: FAMILY MEDICINE CLINIC | Age: 46
End: 2021-06-14
Payer: MEDICAID

## 2021-06-14 VITALS
DIASTOLIC BLOOD PRESSURE: 71 MMHG | RESPIRATION RATE: 18 BRPM | SYSTOLIC BLOOD PRESSURE: 124 MMHG | BODY MASS INDEX: 50.02 KG/M2 | WEIGHT: 293 LBS | HEIGHT: 64 IN | TEMPERATURE: 98.7 F | OXYGEN SATURATION: 98 % | HEART RATE: 85 BPM

## 2021-06-14 DIAGNOSIS — M72.2 PLANTAR FASCIITIS: ICD-10-CM

## 2021-06-14 DIAGNOSIS — R73.9 HYPERGLYCEMIA: ICD-10-CM

## 2021-06-14 DIAGNOSIS — Z32.00 ENCOUNTER FOR PREGNANCY TEST, RESULT UNKNOWN: ICD-10-CM

## 2021-06-14 DIAGNOSIS — I10 ESSENTIAL HYPERTENSION: ICD-10-CM

## 2021-06-14 DIAGNOSIS — E07.9 THYROID DISORDER: ICD-10-CM

## 2021-06-14 DIAGNOSIS — E66.01 MORBID OBESITY (HCC): Primary | ICD-10-CM

## 2021-06-14 PROCEDURE — 99214 OFFICE O/P EST MOD 30 MIN: CPT | Performed by: FAMILY MEDICINE

## 2021-06-14 NOTE — PROGRESS NOTES
Chief Complaint   Patient presents with    Weight Management    Hypertension    Foot Pain     1. Have you been to the ER, urgent care clinic since your last visit? Hospitalized since your last visit? No    2. Have you seen or consulted any other health care providers outside of the [address] [place] [address] [facility] since your last visit? Include any pap smears or colon screening. No     HPI  Merilee Arrow comes in for f/u care. Foot pain: Patient has left foot pain. Pain mainly in the left heel. This is plantar fasciitis. Pain noted early morning especially when she starts walking. Denies trauma or redness. I will refer her to the podiatrist.  We discussed supportive care measures including wearing orthotics, anti-inflammatories, supportive footwear. HTN: Patient has hypertension. Blood pressure is stable. She denies headache, changes in vision or focal weakness. She is on HCTZ and labetalol. We will continue with these medications. Patient needs to have labs drawn. Morbid obesity: Patient has morbid obesity. She is on phentermine. Weight gone down by 4 pounds. She will intensify lifestyle and dietary modification. I have sent in a prescription for the medication. I will follow-up in 1 month. Pregnancy concern: Patient wonders about being pregnant. We will check a pregnancy test.    Thyroid disorder: Patient is concerned about thyroid disorder given weight gain in the past.  We will check a TSH. Hyperglycemia: Patient has hyperglycemia history. I will check her HbA1c. Dyslipidemia: Patient has a history of dyslipidemia. We will recheck lipid panel. Bipolar disorder: Patient has a history of bipolar disorder. She takes Abilify. Stable on the medication. Continue current treatment plan.       Past Medical History  Past Medical History:   Diagnosis Date    B[facility] 1 disorder ([facility] [address]     Borderline diabetes     Genital herpes     Hyperlipidemia     Hyperlipidemia        Surgical History  Past Surgical History:   Procedure Laterality Date    HX  SECTION          Medications  Current Outpatient Medications   Medication Sig Dispense Refill    phentermine (ADIPEX-P) 37.5 mg tablet take 1 tablet by mouth every morning 30 Tablet 0    labetaloL (NORMODYNE) 300 mg tablet take 1 tablet by mouth twice a day 180 Tab 1    levocetirizine (XYZAL) 5 mg tablet Take 1 Tab by mouth daily as needed for Allergies. 30 Tab 2    fluticasone propionate (FLONASE) 50 mcg/actuation nasal spray 2 Sprays by Both Nostrils route daily. 1 Bottle 0    hydroCHLOROthiazide (HYDRODIURIL) 25 mg tablet Take 1 Tab by mouth daily. 90 Tab 1    mupirocin (BACTROBAN) 2 % ointment Apply  to affected area daily. 22 g 0    acyclovir (ZOVIRAX) 400 mg tablet Take 1 Tab by mouth two (2) times a day. 60 Tab 5    ferrous sulfate 325 mg (65 mg iron) tablet Take 1 Tab by mouth two (2) times a day.  pramoxine (PROCTOFOAM) 1 % topical foam apply rectally three times a day      MULTIVITAMIN PO Take  by mouth.  ARIPiprazole (ABILIFY) 15 mg tablet Take 20 mg by mouth daily.          Allergies  No Known Allergies    Family History  Family History   Problem Relation Age of Onset    Hypertension Mother     Heart Disease Father     Heart Surgery Father     Hypertension Father     Diabetes Maternal Aunt     Asthma Sister     Diabetes Brother        Social History  Social History     Socioeconomic History    Marital status: SINGLE     Spouse name: Not on file    Number of children: Not on file    Years of education: Not on file    Highest education level: Not on file   Occupational History    Not on file   Tobacco Use    Smoking status: Never Smoker    Smokeless tobacco: Never Used   Substance and Sexual Activity    Alcohol use: No     Alcohol/week: 0.0 standard drinks    Drug use: No    Sexual activity: Yes     Partners: Male     Birth control/protection: None   Other Topics Concern     Service No    Blood Transfusions No    Caffeine Concern Yes     Comment: ocassionally     Occupational Exposure Not Asked    Hobby Hazards Not Asked    Sleep Concern No    Stress Concern No    Weight Concern Yes    Special Diet No    Back Care Not Asked    Exercise No    Bike Helmet Not Asked    Seat Belt Yes    Self-Exams Yes   Social History Narrative    Not on file     Social Determinants of Health     Financial Resource Strain:     Difficulty of Paying Living Expenses:    Food Insecurity:     Worried About Running Out of Food in the Last Year:     Ran Out of Food in the Last Year:    Transportation Needs:     Lack of Transportation (Medical):  Lack of Transportation (Non-Medical):    Physical Activity:     Days of Exercise per Week:     Minutes of Exercise per Session:    Stress:     Feeling of Stress :    Social Connections:     Frequency of Communication with Friends and Family:     Frequency of Social Gatherings with Friends and Family:     Attends Gnosticist Services:     Active Member of Clubs or Organizations:     Attends Club or Organization Meetings:     Marital Status:    Intimate Partner Violence:     Fear of Current or Ex-Partner:     Emotionally Abused:     Physically Abused:     Sexually Abused:        Review of Systems  Review of Systems - Review of all systems is negative except as noted above in the HPI.     Vital Signs  Visit Vitals  /71 (BP 1 Location: Left upper arm, BP Patient Position: Sitting, BP Cuff Size: Adult)   Pulse 85   Temp 98.7 °F (37.1 °C) (Oral)   Resp 18   Ht 5' 4\" (1.626 m)   Wt 298 lb (135.2 kg)   LMP 05/22/2021   SpO2 98%   BMI 51.15 kg/m²         Physical Exam  Physical Examination: General appearance - alert, well appearing, and in no distress, oriented to person, place, and time, overweight and acyanotic, in no respiratory distress  Mental status - alert, oriented to person, place, and time, affect appropriate to mood  Chest - clear to auscultation, no wheezes, rales or rhonchi, symmetric air entry  Heart - normal rate and regular rhythm, S1 and S2 normal  Abdomen - no rebound tenderness noted  Back exam - limited range of motion  Neurological - motor and sensory grossly normal bilaterally  Musculoskeletal - no muscular tenderness noted  Extremities -left heel pain      Results  Results for orders placed or performed in visit on 02/16/21   NOVEL CORONAVIRUS (COVID-19)   Result Value Ref Range    SARS-CoV-2 DETECTED (AA) Not Detect       ASSESSMENT and PLAN    ICD-10-CM ICD-9-CM    1. Morbid obesity ([facility] [address]  E66.01 278.01    2. Essential hypertension  I10 401.9 LIPID PANEL      METABOLIC PANEL, COMPREHENSIVE      CBC WITH AUTOMATED DIFF   3. Plantar fasciitis  M72.2 728.71 REFERRAL TO PODIATRY   4. Thyroid disorder  E07.9 246.9 TSH 3RD GENERATION   5. Hyperglycemia  R73.9 790.29 HEMOGLOBIN A1C WITH EAG   6. Encounter for pregnancy test, result unknown  Z32.00 V72.40 AMB POC URINE PREGNANCY TEST, VISUAL COLOR COMPARISON     lab results and schedule of future lab studies reviewed with patient  reviewed diet, exercise and weight control  cardiovascular risk and specific lipid/LDL goals reviewed  reviewed medications and side effects in detail      I have discussed the diagnosis with the patient and the intended plan of care as seen in the above orders. The patient has received an after-visit summary and questions were answered concerning future plans. I have discussed medication, side effects, and warnings with the patient in detail. The patient verbalized understanding and is in agreement with the plan of care. The patient will contact the office with any additional concerns. Petrona Payne MD    PLEASE NOTE:   This document has been produced using voice recognition software.  Unrecognized errors in transcription may be present

## 2021-06-16 ENCOUNTER — HOSPITAL ENCOUNTER (OUTPATIENT)
Dept: LAB | Age: 46
Discharge: HOME | End: 2021-06-16

## 2021-06-16 LAB — SENTARA SPECIMEN COL,SENBCF: NORMAL

## 2021-06-16 PROCEDURE — 99001 SPECIMEN HANDLING PT-LAB: CPT

## 2021-06-17 LAB
A-G RATIO,AGRAT: 1.4 RATIO (ref 1.1–2.6)
ABSOLUTE LYMPHOCYTE COUNT, 10803: 2.6 K/UL (ref 1–4.8)
ALBUMIN SERPL-MCNC: 4.4 G/DL (ref 3.5–5)
ALP SERPL-CCNC: 87 U/L (ref 25–115)
ALT SERPL-CCNC: 16 U/L (ref 5–40)
ANION GAP SERPL CALC-SCNC: 9 MMOL/L (ref 3–15)
AST SERPL W P-5'-P-CCNC: 13 U/L (ref 10–37)
AVG GLU, 10930: 128 MG/DL (ref 91–123)
BASOPHILS # BLD: 0 K/UL (ref 0–0.2)
BASOPHILS NFR BLD: 0 % (ref 0–2)
BILIRUB SERPL-MCNC: 0.9 MG/DL (ref 0.2–1.2)
BUN SERPL-MCNC: 13 MG/DL (ref 6–22)
CALCIUM SERPL-MCNC: 9.6 MG/DL (ref 8.4–10.5)
CHLORIDE SERPL-SCNC: 99 MMOL/L (ref 98–110)
CHOLEST SERPL-MCNC: 182 MG/DL (ref 110–200)
CO2 SERPL-SCNC: 29 MMOL/L (ref 20–32)
CREAT SERPL-MCNC: 0.8 MG/DL (ref 0.5–1.2)
EOSINOPHIL # BLD: 0.2 K/UL (ref 0–0.5)
EOSINOPHIL NFR BLD: 2 % (ref 0–6)
ERYTHROCYTE [DISTWIDTH] IN BLOOD BY AUTOMATED COUNT: 15.2 % (ref 10–15.5)
GFRAA, 66117: >60
GFRNA, 66118: >60
GLOBULIN,GLOB: 3.2 G/DL (ref 2–4)
GLUCOSE SERPL-MCNC: 107 MG/DL (ref 70–99)
GRANULOCYTES,GRANS: 56 % (ref 40–75)
HBA1C MFR BLD HPLC: 6.1 % (ref 4.8–5.6)
HCT VFR BLD AUTO: 39 % (ref 35.1–46.5)
HDLC SERPL-MCNC: 36 MG/DL
HDLC SERPL-MCNC: 5.1 MG/DL (ref 0–5)
HGB BLD-MCNC: 11.5 G/DL (ref 11.7–15.5)
LDL/HDL RATIO,LDHD: 3.4
LDLC SERPL CALC-MCNC: 123 MG/DL (ref 50–99)
LYMPHOCYTES, LYMLT: 33 % (ref 20–45)
MCH RBC QN AUTO: 25 PG (ref 26–34)
MCHC RBC AUTO-ENTMCNC: 30 G/DL (ref 31–36)
MCV RBC AUTO: 84 FL (ref 81–99)
MONOCYTES # BLD: 0.7 K/UL (ref 0.1–1)
MONOCYTES NFR BLD: 8 % (ref 3–12)
NEUTROPHILS # BLD AUTO: 4.3 K/UL (ref 1.8–7.7)
NON-HDL CHOLESTEROL, 011976: 146 MG/DL
PLATELET # BLD AUTO: 442 K/UL (ref 140–440)
PMV BLD AUTO: 9.5 FL (ref 9–13)
POTASSIUM SERPL-SCNC: 3.8 MMOL/L (ref 3.5–5.5)
PROT SERPL-MCNC: 7.6 G/DL (ref 6.4–8.3)
RBC # BLD AUTO: 4.66 M/UL (ref 3.8–5.2)
SODIUM SERPL-SCNC: 137 MMOL/L (ref 133–145)
TRIGL SERPL-MCNC: 115 MG/DL (ref 40–149)
TSH SERPL DL<=0.005 MIU/L-ACNC: 1.6 MCU/ML (ref 0.27–4.2)
VLDLC SERPL CALC-MCNC: 23 MG/DL (ref 8–30)
WBC # BLD AUTO: 7.7 K/UL (ref 4–11)

## 2021-06-17 NOTE — PROGRESS NOTES
Please let patient know her LDL cholesterol is 123. We would want this to go down below 100. She should exercise and take a diet low in polysaturated fats. She should consider taking cholesterol-lowering medication. If she is willing I will send in Lipitor to take 10 mg daily. Recheck labs in 3 to 6 months. High HbA1c 6.1. This indicates prediabetes. She should intensify lifestyle and dietary modification. Rest of her labs are reassuring.   Alvaro Rowan MD

## 2021-06-21 NOTE — PROGRESS NOTES
Spoke with patient. Patient was given lab results. Patient stated understanding. Patient didn't have any questions or concerns. Patient also stated that she will take the lipitor.

## 2021-06-28 ENCOUNTER — TELEPHONE (OUTPATIENT)
Dept: FAMILY MEDICINE CLINIC | Age: 46
End: 2021-06-28

## 2021-06-28 RX ORDER — ATORVASTATIN CALCIUM 10 MG/1
10 TABLET, FILM COATED ORAL DAILY
Qty: 30 TABLET | Refills: 2 | Status: SHIPPED | OUTPATIENT
Start: 2021-06-28 | End: 2021-09-21

## 2021-06-28 RX ORDER — HYDROCHLOROTHIAZIDE 25 MG/1
TABLET ORAL
Qty: 90 TABLET | Refills: 1 | Status: SHIPPED | OUTPATIENT
Start: 2021-06-28 | End: 2021-12-29

## 2021-06-28 NOTE — TELEPHONE ENCOUNTER
Patient called stating no one has called in her cholesterol medication.  (lipitor- discussed during lab results)

## 2021-07-07 DIAGNOSIS — J30.9 ALLERGIC RHINITIS, UNSPECIFIED SEASONALITY, UNSPECIFIED TRIGGER: ICD-10-CM

## 2021-07-07 RX ORDER — LEVOCETIRIZINE DIHYDROCHLORIDE 5 MG/1
TABLET, FILM COATED ORAL
Qty: 90 TABLET | Refills: 1 | Status: SHIPPED | OUTPATIENT
Start: 2021-07-07 | End: 2021-09-13

## 2021-07-13 ENCOUNTER — OFFICE VISIT (OUTPATIENT)
Dept: FAMILY MEDICINE CLINIC | Age: 46
End: 2021-07-13
Payer: MEDICAID

## 2021-07-13 VITALS
HEIGHT: 64 IN | BODY MASS INDEX: 50.02 KG/M2 | SYSTOLIC BLOOD PRESSURE: 139 MMHG | TEMPERATURE: 98.5 F | HEART RATE: 82 BPM | OXYGEN SATURATION: 97 % | RESPIRATION RATE: 18 BRPM | WEIGHT: 293 LBS | DIASTOLIC BLOOD PRESSURE: 78 MMHG

## 2021-07-13 DIAGNOSIS — F31.9 BIPOLAR 1 DISORDER (HCC): ICD-10-CM

## 2021-07-13 DIAGNOSIS — E78.5 HYPERLIPIDEMIA, UNSPECIFIED HYPERLIPIDEMIA TYPE: ICD-10-CM

## 2021-07-13 DIAGNOSIS — E66.01 OBESITY, MORBID (HCC): ICD-10-CM

## 2021-07-13 DIAGNOSIS — R73.9 HYPERGLYCEMIA: ICD-10-CM

## 2021-07-13 DIAGNOSIS — H66.90 ACUTE OTITIS MEDIA, UNSPECIFIED OTITIS MEDIA TYPE: Primary | ICD-10-CM

## 2021-07-13 DIAGNOSIS — I10 ESSENTIAL HYPERTENSION: ICD-10-CM

## 2021-07-13 PROCEDURE — 99214 OFFICE O/P EST MOD 30 MIN: CPT | Performed by: FAMILY MEDICINE

## 2021-07-13 RX ORDER — PHENTERMINE HYDROCHLORIDE 37.5 MG/1
37.5 TABLET ORAL
Qty: 30 TABLET | Refills: 0 | Status: SHIPPED | OUTPATIENT
Start: 2021-07-13 | End: 2021-12-23

## 2021-07-13 RX ORDER — AMOXICILLIN 500 MG/1
500 CAPSULE ORAL 3 TIMES DAILY
Qty: 30 CAPSULE | Refills: 0 | Status: SHIPPED | OUTPATIENT
Start: 2021-07-13 | End: 2021-07-23

## 2021-07-13 NOTE — PROGRESS NOTES
Chief Complaint   Patient presents with    Follow-up    Ear Pain     1. Have you been to the ER, urgent care clinic since your last visit? Hospitalized since your last visit? No    2. Have you seen or consulted any other health care providers outside of the [address] [place] [address] since your last visit? Include any pap smears or colon screening. No     HPI  Samy Boland comes in for f/u care. Morbid obesity: Patient has morbid obesity. BMI 50. 81. She has lost 2 pounds since her last prescription of phentermine. I will give 1 prescription. She now has taken 3 prescriptions. After this she will continue to intensify lifestyle and dietary modification. HTN: Patient has hypertension. Blood pressure is stable. She denies headache, changes in vision or focal weakness. She is on labetalol and HCTZ. We will continue with the current treatment plan. Plantar fasciitis: Patient has a history of plantar fasciitis left foot. Seen by the podiatrist and given a local steroid injection. Has been advised to get orthotics. Continue current management plan. Symptoms seem to have improved. Dyslipidemia: Patient has dyslipidemia. She is on Lipitor 10 mg daily. Tolerating medication. We will recheck lipid panel at next visit. Continue current treatment plan. Bipolar disorder: Patient has bipolar disorder. She is on Abilify. Continue with current treatment plan. She has been followed up with behavioral health specialist.  Ear pain: Patient has pain right ear. This is inflamed. She has acute otitis media. I will give amoxicillin. Sinus congestion: Patient has sinus congestion with postnasal drainage that is mucopurulent in nature. Denies fever or chills. We will give amoxicillin. Prediabetes: Patient has prediabetes. She will intensify lifestyle and dietary modification. I will recheck her HbA1c today.       Past Medical History  Past Medical History:   Diagnosis Date    Bipolar 1 disorder ([facility] [address]  Borderline diabetes     Genital herpes     Hyperlipidemia     Hyperlipidemia        Surgical History  Past Surgical History:   Procedure Laterality Date    HX  SECTION          Medications  Current Outpatient Medications   Medication Sig Dispense Refill    levocetirizine (XYZAL) 5 mg tablet take 1 tablet by mouth daily if needed for allergies 90 Tablet 1    hydroCHLOROthiazide (HYDRODIURIL) 25 mg tablet take 1 tablet by mouth once daily 90 Tablet 1    atorvastatin (LIPITOR) 10 mg tablet Take 1 Tablet by mouth daily. 30 Tablet 2    phentermine (ADIPEX-P) 37.5 mg tablet take 1 tablet by mouth every morning 30 Tablet 0    labetaloL (NORMODYNE) 300 mg tablet take 1 tablet by mouth twice a day 180 Tab 1    fluticasone propionate (FLONASE) 50 mcg/actuation nasal spray 2 Sprays by Both Nostrils route daily. 1 Bottle 0    mupirocin (BACTROBAN) 2 % ointment Apply  to affected area daily. 22 g 0    acyclovir (ZOVIRAX) 400 mg tablet Take 1 Tab by mouth two (2) times a day. 60 Tab 5    ferrous sulfate 325 mg (65 mg iron) tablet Take 1 Tab by mouth two (2) times a day.  pramoxine (PROCTOFOAM) 1 % topical foam apply rectally three times a day      MULTIVITAMIN PO Take  by mouth.  ARIPiprazole (ABILIFY) 15 mg tablet Take 20 mg by mouth daily.          Allergies  No Known Allergies    Family History  Family History   Problem Relation Age of Onset    Hypertension Mother     Heart Disease Father     Heart Surgery Father     Hypertension Father     Diabetes Maternal Aunt     Asthma Sister     Diabetes Brother        Social History  Social History     Socioeconomic History    Marital status: SINGLE     Spouse name: Not on file    Number of children: Not on file    Years of education: Not on file    Highest education level: Not on file   Occupational History    Not on file   Tobacco Use    Smoking status: Never Smoker    Smokeless tobacco: Never Used   Substance and Sexual Activity  Alcohol use: No     Alcohol/week: 0.0 standard drinks    Drug use: No    Sexual activity: Yes     Partners: Male     Birth control/protection: None   Other Topics Concern     Service No    Blood Transfusions No    Caffeine Concern Yes     Comment: ocassionally     Occupational Exposure Not Asked    Hobby Hazards Not Asked    Sleep Concern No    Stress Concern No    Weight Concern Yes    Special Diet No    Back Care Not Asked    Exercise No    Bike Helmet Not Asked    Seat Belt Yes    Self-Exams Yes   Social History Narrative    Not on file     Social Determinants of Health     Financial Resource Strain:     Difficulty of Paying Living Expenses:    Food Insecurity:     Worried About Running Out of Food in the Last Year:     Ran Out of Food in the Last Year:    Transportation Needs:     Lack of Transportation (Medical):  Lack of Transportation (Non-Medical):    Physical Activity:     Days of Exercise per Week:     Minutes of Exercise per Session:    Stress:     Feeling of Stress :    Social Connections:     Frequency of Communication with Friends and Family:     Frequency of Social Gatherings with Friends and Family:     Attends Church Services:     Active Member of Clubs or Organizations:     Attends Club or Organization Meetings:     Marital Status:    Intimate Partner Violence:     Fear of Current or Ex-Partner:     Emotionally Abused:     Physically Abused:     Sexually Abused:        Review of Systems  Review of Systems - Review of all systems is negative except as noted above in the HPI.     Vital Signs  Visit Vitals  /78 (BP 1 Location: Left upper arm, BP Patient Position: Sitting, BP Cuff Size: Adult)   Pulse 82   Temp 98.5 °F (36.9 °C) (Oral)   Resp 18   Ht 5' 4\" (1.626 m)   Wt 296 lb (134.3 kg)   LMP 06/22/2021   SpO2 97%   BMI 50.81 kg/m²       Physical Exam  Physical Examination: General appearance - oriented to person, place, and time, overweight and acyanotic, in no respiratory distress  Mental status - affect appropriate to mood  Eyes - sclera anicteric  Ears - right TM red, dull, bulging  Nose - normal and patent, no erythema, discharge or polyps  Mouth - mucous membranes moist, pharynx normal without lesions  Neck - supple, no significant adenopathy  Lymphatics - no palpable lymphadenopathy  Chest - clear to auscultation, no wheezes, rales or rhonchi, symmetric air entry  Heart - S1 and S2 normal  Abdomen - no rebound tenderness noted  Back exam - limited range of motion  Neurological - motor and sensory grossly normal bilaterally  Musculoskeletal - osteoarthritic changes noted in both hands, left foot pain  Extremities - intact peripheral pulses      Results  Results for orders placed or performed during the hospital encounter of 06/16/21   [facility] SPECIMEN [facility]. Result Value Ref Range    [facility] Specimens collected/sent to [facility]         ASSESSMENT and PLAN    ICD-10-CM ICD-9-CM    1. Acute otitis media, unspecified otitis media type  H66.90 382.9 amoxicillin (AMOXIL) 500 mg capsule   2. Obesity, morbid ([address]  E66.01 278.01 phentermine (ADIPEX-P) 37.5 mg tablet   3. Bipolar 1 disorder (HCC)  F31.9 296.7    4. Hyperlipidemia, unspecified hyperlipidemia type  E78.5 272.4    5. Essential hypertension  I10 401.9    6. Hyperglycemia  R73.9 790.29      lab results and schedule of future lab studies reviewed with patient  reviewed diet, exercise and weight control  cardiovascular risk and specific lipid/LDL goals reviewed  reviewed medications and side effects in detail      I have discussed the diagnosis with the patient and the intended plan of care as seen in the above orders. The patient has received an after-visit summary and questions were answered concerning future plans. I have discussed medication, side effects, and warnings with the patient in detail. The patient verbalized understanding and is in agreement with the plan of care. The patient will contact the office with any additional concerns. Gabbie Lovelace MD    PLEASE NOTE:   This document has been produced using voice recognition software.  Unrecognized errors in transcription may be present

## 2021-07-29 ENCOUNTER — TELEPHONE (OUTPATIENT)
Dept: FAMILY MEDICINE CLINIC | Age: 46
End: 2021-07-29

## 2021-07-29 NOTE — TELEPHONE ENCOUNTER
Spoke with patient at this time regarding concerns. Patient states she may be pregnant but she will confirm today at her appointment. Patient wanted to know if she should take her BP medications. Advised patient to wait until she find out if she is actual pregnant first if she is pregnant she will need to make appointment with her obgyn if not then take her medications

## 2021-07-30 ENCOUNTER — TELEPHONE (OUTPATIENT)
Dept: FAMILY MEDICINE CLINIC | Age: 46
End: 2021-07-30

## 2021-07-30 NOTE — TELEPHONE ENCOUNTER
Patient called in stating that she did have a positive pregnancy test and wanted to know was it safe to continue to take prescribed medications. Patient was informed that a message will be put in to the doctor but she can also consult with her obgyn. Patient stated understanding.

## 2021-08-02 NOTE — TELEPHONE ENCOUNTER
Stop the hydrochlorothiazide. Only take the labetalol. She should call her OB/GYN and find out what other medication to take or try to get in sooner.   Andi Jacob MD

## 2021-08-02 NOTE — TELEPHONE ENCOUNTER
Patient can take labetalol. She should stop all other medications and consult her OB/GYN.   Bladimir Hernandez MD

## 2021-08-02 NOTE — TELEPHONE ENCOUNTER
Patient states can she take Hydrochlorothiazide. Her appointment with her OB is not until 08/23/2021

## 2021-08-12 ENCOUNTER — TELEPHONE (OUTPATIENT)
Dept: FAMILY MEDICINE CLINIC | Age: 46
End: 2021-08-12

## 2021-08-12 NOTE — TELEPHONE ENCOUNTER
Patient would like to speak to the nurse regarding COVID-19. She'd like to get the vaccine tomorrow. She wants to know if she needs the vaccine if she had COVID-19. Please advise.

## 2021-08-12 NOTE — TELEPHONE ENCOUNTER
Attempted to call no answer left message for patient to decide-reminded patient of the different variants and the overall decision is hers

## 2021-08-18 ENCOUNTER — TELEPHONE (OUTPATIENT)
Dept: FAMILY MEDICINE CLINIC | Age: 46
End: 2021-08-18

## 2021-09-10 DIAGNOSIS — I10 ESSENTIAL HYPERTENSION: ICD-10-CM

## 2021-09-10 DIAGNOSIS — R73.9 HYPERGLYCEMIA: ICD-10-CM

## 2021-09-10 DIAGNOSIS — E07.9 THYROID DISORDER: ICD-10-CM

## 2021-09-13 ENCOUNTER — OFFICE VISIT (OUTPATIENT)
Dept: FAMILY MEDICINE CLINIC | Age: 46
End: 2021-09-13
Payer: MEDICAID

## 2021-09-13 VITALS
HEIGHT: 64 IN | DIASTOLIC BLOOD PRESSURE: 77 MMHG | BODY MASS INDEX: 50.02 KG/M2 | WEIGHT: 293 LBS | RESPIRATION RATE: 18 BRPM | TEMPERATURE: 98.1 F | OXYGEN SATURATION: 93 % | HEART RATE: 81 BPM | SYSTOLIC BLOOD PRESSURE: 117 MMHG

## 2021-09-13 DIAGNOSIS — R73.03 PREDIABETES: ICD-10-CM

## 2021-09-13 DIAGNOSIS — F31.9 BIPOLAR 1 DISORDER (HCC): ICD-10-CM

## 2021-09-13 DIAGNOSIS — Z12.11 SCREEN FOR COLON CANCER: ICD-10-CM

## 2021-09-13 DIAGNOSIS — R73.9 HYPERGLYCEMIA: ICD-10-CM

## 2021-09-13 DIAGNOSIS — I10 ESSENTIAL HYPERTENSION: Primary | ICD-10-CM

## 2021-09-13 DIAGNOSIS — E78.5 HYPERLIPIDEMIA, UNSPECIFIED HYPERLIPIDEMIA TYPE: ICD-10-CM

## 2021-09-13 PROCEDURE — 99213 OFFICE O/P EST LOW 20 MIN: CPT | Performed by: FAMILY MEDICINE

## 2021-09-13 NOTE — PROGRESS NOTES
[facility]  Roxie Jonhson comes in for f/u care. HTN: Patient has hypertension. Blood pressure is stable. She denies headache, changes in vision or focal weakness. She is on labetalol and HCTZ. We will continue with these medications. Morbid obesity: Patient has morbid obesity with a BMI of 50.64. In the past she has been on phentermine. We discussed lifestyle and dietary modification. She will intensify this. Dyslipidemia: Patient has dyslipidemia. She is on Lipitor. She will exercise and take a diet low in polysaturated fats. I will recheck labs today. Prediabetes: Patient has a history of prediabetes. She is doing lifestyle and dietary modification. We will recheck her HbA1c. Mood disorder: Patient has bipolar disorder. She takes Abilify. Has been followed up by the behavioral health specialist.  She will continue with the current treatment plan. Genital herpes: Patient has history of genital herpes. Takes acyclovir. Past Medical History  Past Medical History:   Diagnosis Date    B[facility] disorder ([facility] [address]     Borderline diabetes     Genital herpes     Hyperlipidemia     Hyperlipidemia        Surgical History  Past Surgical History:   Procedure Laterality Date    HX  SECTION          Medications  Current Outpatient Medications   Medication Sig Dispense Refill    hydroCHLOROthiazide (HYDRODIURIL) 25 mg tablet take 1 tablet by mouth once daily 90 Tablet 1    atorvastatin (LIPITOR) 10 mg tablet Take 1 Tablet by mouth daily. 30 Tablet 2    labetaloL (NORMODYNE) 300 mg tablet take 1 tablet by mouth twice a day 180 Tab 1    fluticasone propionate (FLONASE) 50 mcg/actuation nasal spray 2 Sprays by Both Nostrils route daily. 1 Bottle 0    acyclovir (ZOVIRAX) 400 mg tablet Take 1 Tab by mouth two (2) times a day. 60 Tab 5    MULTIVITAMIN PO Take  by mouth.  ARIPiprazole (ABILIFY) 15 mg tablet Take 20 mg by mouth daily.       phentermine (ADIPEX-P) 37.5 mg tablet Take 1 Tablet by mouth every morning. Max Daily Amount: 37.5 mg. 30 Tablet 0    levocetirizine (XYZAL) 5 mg tablet take 1 tablet by mouth daily if needed for allergies (Patient not taking: Reported on 9/13/2021) 90 Tablet 1    mupirocin (BACTROBAN) 2 % ointment Apply  to affected area daily. (Patient not taking: Reported on 9/13/2021) 22 g 0    ferrous sulfate 325 mg (65 mg iron) tablet Take 1 Tab by mouth two (2) times a day.  (Patient not taking: Reported on 9/13/2021)      pramoxine (PROCTOFOAM) 1 % topical foam apply rectally three times a day (Patient not taking: Reported on 9/13/2021)         Allergies  No Known Allergies    Family History  Family History   Problem Relation Age of Onset    Hypertension Mother     Heart Disease Father     Heart Surgery Father     Hypertension Father     Diabetes Maternal Aunt     Asthma Sister     Diabetes Brother        Social History  Social History     Socioeconomic History    Marital status: SINGLE     Spouse name: Not on file    Number of children: Not on file    Years of education: Not on file    Highest education level: Not on file   Occupational History    Not on file   Tobacco Use    Smoking status: Never Smoker    Smokeless tobacco: Never Used   Vaping Use    Vaping Use: Never used   Substance and Sexual Activity    Alcohol use: No     Alcohol/week: 0.0 standard drinks    Drug use: No    Sexual activity: Yes     Partners: Male     Birth control/protection: None   Other Topics Concern     Service No    Blood Transfusions No    Caffeine Concern Yes     Comment: ocassionally     Occupational Exposure Not Asked    Hobby Hazards Not Asked    Sleep Concern No    Stress Concern No    Weight Concern Yes    Special Diet No    Back Care Not Asked    Exercise No    Bike Helmet Not Asked    Seat Belt Yes    Self-Exams Yes   Social History Narrative    Not on file     Social Determinants of Health     Financial Resource Strain:     Difficulty of Paying Living Expenses:    Food Insecurity:     Worried About [address] in the Last Year:     [address] in the Last Year:    Transportation Needs:     Lack of Transportation (Medical):  Lack of Transportation (Non-Medical):    Physical Activity:     Days of Exercise per Week:     Minutes of Exercise per Session:    Stress:     Feeling of Stress :    Social Connections:     Frequency of Communication with Friends and Family:     Frequency of Social Gatherings with Friends and Family:     Attends Taoist Services:     Active Member of Clubs or Organizations:     Attends Club or Organization Meetings:     Marital Status:    Intimate Partner Violence:     Fear of Current or Ex-Partner:     Emotionally Abused:     Physically Abused:     Sexually Abused:        Review of Systems  Review of Systems - Review of all systems is negative except as noted above in the HPI. Vital Signs  Visit Vitals  /77 (BP 1 Location: Left upper arm, BP Patient Position: Sitting, BP Cuff Size: Adult)   Pulse 81   Temp 98.1 °F (36.7 °C) (Oral)   Resp 18   Ht 5' 4\" (1.626 m)   Wt 295 lb (133.8 kg)   LMP 09/11/2021   SpO2 93%   BMI 50.64 kg/m²         Physical Exam  Physical Examination: General appearance - oriented to person, place, and time, overweight, acyanotic, in no respiratory distress and well hydrated  Mental status - alert, oriented to person, place, and time, affect appropriate to mood  Chest - clear to auscultation, no wheezes, rales or rhonchi, symmetric air entry  Heart - S1 and S2 normal  Abdomen - no rebound tenderness noted  Neurological - normal muscle tone, no tremors, strength 5/5  Musculoskeletal - no muscular tenderness noted  Extremities - intact peripheral pulses      Results  Results for orders placed or performed during the hospital encounter of 06/16/21   [address] [place] [address].    Result Value Ref Range    [facility] Specimens collected/sent to [facility] ASSESSMENT and PLAN    ICD-10-CM ICD-9-CM    1. Essential hypertension  N21 830.5 METABOLIC PANEL, COMPREHENSIVE   2. Hyperglycemia  R73.9 790.29 HEMOGLOBIN A1C WITH EAG   3. Prediabetes  R73.03 790.29 HEMOGLOBIN A1C WITH EAG   4. Bipolar 1 disorder (HCC)  F31.9 296.7    5. Hyperlipidemia, unspecified hyperlipidemia type  E78.5 272.4 LIPID PANEL   6. Screen for colon cancer  Z12.11 V76.51 REFERRAL TO COLON AND RECTAL SURGERY     lab results and schedule of future lab studies reviewed with patient  reviewed diet, exercise and weight control  reviewed medications and side effects in detail      I have discussed the diagnosis with the patient and the intended plan of care as seen in the above orders. The patient has received an after-visit summary and questions were answered concerning future plans. I have discussed medication, side effects, and warnings with the patient in detail. The patient verbalized understanding and is in agreement with the plan of care. The patient will contact the office with any additional concerns. Henrietta Valdez MD    PLEASE NOTE:   This document has been produced using voice recognition software.  Unrecognized errors in transcription may be present

## 2021-09-13 NOTE — PROGRESS NOTES
Chief Complaint   Patient presents with    Follow Up Chronic Condition     1. Have you been to the ER, urgent care clinic since your last visit? Hospitalized since your last visit? No    2. Have you seen or consulted any other health care providers outside of the [address] [place] [address] since your last visit? Include any pap smears or colon screening.  No

## 2021-09-21 ENCOUNTER — OFFICE VISIT (OUTPATIENT)
Dept: FAMILY MEDICINE CLINIC | Age: 46
End: 2021-09-21
Payer: MEDICAID

## 2021-09-21 VITALS
BODY MASS INDEX: 50.02 KG/M2 | HEIGHT: 64 IN | WEIGHT: 293 LBS | OXYGEN SATURATION: 100 % | RESPIRATION RATE: 18 BRPM | TEMPERATURE: 97.8 F | HEART RATE: 87 BPM | SYSTOLIC BLOOD PRESSURE: 125 MMHG | DIASTOLIC BLOOD PRESSURE: 80 MMHG

## 2021-09-21 DIAGNOSIS — N89.8 VAGINAL DISCHARGE: Primary | ICD-10-CM

## 2021-09-21 DIAGNOSIS — I10 ESSENTIAL HYPERTENSION: ICD-10-CM

## 2021-09-21 DIAGNOSIS — R30.0 DYSURIA: ICD-10-CM

## 2021-09-21 PROCEDURE — 81003 URINALYSIS AUTO W/O SCOPE: CPT | Performed by: FAMILY MEDICINE

## 2021-09-21 PROCEDURE — 99213 OFFICE O/P EST LOW 20 MIN: CPT | Performed by: FAMILY MEDICINE

## 2021-09-21 RX ORDER — METRONIDAZOLE 500 MG/1
500 TABLET ORAL 2 TIMES DAILY
Qty: 14 TABLET | Refills: 0 | Status: SHIPPED | OUTPATIENT
Start: 2021-09-21 | End: 2021-09-28

## 2021-09-21 RX ORDER — ATORVASTATIN CALCIUM 10 MG/1
TABLET, FILM COATED ORAL
Qty: 90 TABLET | Refills: 1 | Status: SHIPPED | OUTPATIENT
Start: 2021-09-21 | End: 2022-03-07 | Stop reason: SDUPTHER

## 2021-09-21 NOTE — PROGRESS NOTES
[facility] [place] [facility]. Vaginal discharge: Patient has vaginal discharge. Recently seen by her gynecologist and treated for bacterial vaginosis. Was put on metronidazole. She only took the medication for 3 days. The discharge is back. It is not mucopurulent. No itching and is not smelly. It is not copious. She would like to complete treatment for bacterial vaginosis. I will send in metronidazole. Dysuria: Patient recently treated for UTI. Has some mild dysuria. We did a urinalysis that is negative for any abnormality. For now patient is reassured. She will take adequate amount of fluid. I will follow-up at next visit. Hypertension: Patient has hypertension. She is stable on medication. She takes HCTZ and labetalol. Continue current treatment plan. She denies headache, changes in vision or focal weakness. Past Medical History  Past Medical History:   Diagnosis Date    B[facility] disorder ([facility] [address]     Borderline diabetes     Genital herpes     Hyperlipidemia     Hyperlipidemia        Surgical History  Past Surgical History:   Procedure Laterality Date    HX  SECTION          Medications  Current Outpatient Medications   Medication Sig Dispense Refill    atorvastatin (LIPITOR) 10 mg tablet take 1 tablet by mouth once daily 90 Tablet 1    hydroCHLOROthiazide (HYDRODIURIL) 25 mg tablet take 1 tablet by mouth once daily 90 Tablet 1    labetaloL (NORMODYNE) 300 mg tablet take 1 tablet by mouth twice a day 180 Tab 1    fluticasone propionate (FLONASE) 50 mcg/actuation nasal spray 2 Sprays by Both Nostrils route daily. 1 Bottle 0    acyclovir (ZOVIRAX) 400 mg tablet Take 1 Tab by mouth two (2) times a day. 60 Tab 5    MULTIVITAMIN PO Take  by mouth.  ARIPiprazole (ABILIFY) 15 mg tablet Take 20 mg by mouth daily.  phentermine (ADIPEX-P) 37.5 mg tablet Take 1 Tablet by mouth every morning.  Max Daily Amount: 37.5 mg. (Patient not taking: Reported on 9/21/2021) 30 Tablet 0       Allergies  No Known Allergies    Family History  Family History   Problem Relation Age of Onset    Hypertension Mother     Heart Disease Father     Heart Surgery Father     Hypertension Father     Diabetes Maternal Aunt     Asthma Sister     Diabetes Brother        Social History  Social History     Socioeconomic History    Marital status: SINGLE     Spouse name: Not on file    Number of children: Not on file    Years of education: Not on file    Highest education level: Not on file   Occupational History    Not on file   Tobacco Use    Smoking status: Never Smoker    Smokeless tobacco: Never Used   Vaping Use    Vaping Use: Never used   Substance and Sexual Activity    Alcohol use: No     Alcohol/week: 0.0 standard drinks    Drug use: No    Sexual activity: Yes     Partners: Male     Birth control/protection: None   Other Topics Concern     Service No    Blood Transfusions No    Caffeine Concern Yes     Comment: ocassionally     Occupational Exposure Not Asked    Hobby Hazards Not Asked    Sleep Concern No    Stress Concern No    Weight Concern Yes    Special Diet No    Back Care Not Asked    Exercise No    Bike Helmet Not Asked    Seat Belt Yes    Self-Exams Yes   Social History Narrative    Not on file     Social Determinants of Health     Financial Resource Strain:     Difficulty of Paying Living Expenses:    Food Insecurity:     Worried About Running Out of Food in the Last Year:     Ran Out of Food in the Last Year:    Transportation Needs:     Lack of Transportation (Medical):      Lack of Transportation (Non-Medical):    Physical Activity:     Days of Exercise per Week:     Minutes of Exercise per Session:    Stress:     Feeling of Stress :    Social Connections:     Frequency of Communication with Friends and Family:     Frequency of Social Gatherings with Friends and Family:     Attends Voodoo Services:     Active Member of Clubs or Organizations:     Attends Club or Organization Meetings:     Marital Status:    Intimate Partner Violence:     Fear of Current or Ex-Partner:     Emotionally Abused:     Physically Abused:     Sexually Abused:        Review of Systems  Review of Systems - Review of all systems is negative except as noted above in the HPI. Vital Signs  Visit Vitals  /80 (BP 1 Location: Left upper arm, BP Patient Position: Sitting, BP Cuff Size: Adult)   Pulse 87   Temp 97.8 °F (36.6 °C) (Oral)   Resp 18   Ht 5' 4\" (1.626 m)   Wt 295 lb (133.8 kg)   LMP 09/11/2021   SpO2 100%   BMI 50.64 kg/m²         Physical Exam  Physical Examination: General appearance - alert, well appearing, and in no distress, oriented to person, place, and time and overweight  Mental status - alert, oriented to person, place, and time, affect appropriate to mood  Chest - clear to auscultation, no wheezes, rales or rhonchi, symmetric air entry  Heart - normal rate, regular rhythm, normal S1, S2, no murmurs, rubs, clicks or gallops  Abdomen - soft, nontender, nondistended, no masses or organomegaly  Neurological - motor and sensory grossly normal bilaterally  Musculoskeletal - no muscular tenderness noted  Extremities - intact peripheral pulses      Results  Results for orders placed or performed during the hospital encounter of 06/16/21   [address] [place] [address]. Result Value Ref Range    [facility] SPECIM[facility] Specimens collected/sent to [facility]         ASSESSMENT and PLAN    ICD-10-CM ICD-9-CM    1. Vaginal discharge  N89.8 623.5    2. Dysuria  R30.0 788.1 AMB POC URINALYSIS DIP STICK AUTO W/O MICRO   3. Essential hypertension  I10 401.9      lab results and schedule of future lab studies reviewed with patient  reviewed diet, exercise and weight control  reviewed medications and side effects in detail      I have discussed the diagnosis with the patient and the intended plan of care as seen in the above orders.  The patient has received an after-visit summary and questions were answered concerning future plans. I have discussed medication, side effects, and warnings with the patient in detail. The patient verbalized understanding and is in agreement with the plan of care. The patient will contact the office with any additional concerns. Luke Quiroz MD    PLEASE NOTE:   This document has been produced using voice recognition software.  Unrecognized errors in transcription may be present

## 2021-09-21 NOTE — PROGRESS NOTES
Chief Complaint   Patient presents with    Urinary Pain     1. Have you been to the ER, urgent care clinic since your last visit? Hospitalized since your last visit? No    2. Have you seen or consulted any other health care providers outside of the [address] [place] [address] since your last visit? Include any pap smears or colon screening.  No

## 2021-09-22 ENCOUNTER — LAB ONLY (OUTPATIENT)
Dept: FAMILY MEDICINE CLINIC | Age: 46
End: 2021-09-22
Payer: MEDICAID

## 2021-09-22 DIAGNOSIS — R73.03 PREDIABETES: ICD-10-CM

## 2021-09-22 DIAGNOSIS — E78.5 HYPERLIPIDEMIA, UNSPECIFIED HYPERLIPIDEMIA TYPE: ICD-10-CM

## 2021-09-22 DIAGNOSIS — R73.9 HYPERGLYCEMIA: ICD-10-CM

## 2021-09-22 DIAGNOSIS — Z01.89 ENCOUNTER FOR LABORATORY TEST: Primary | ICD-10-CM

## 2021-09-22 DIAGNOSIS — I10 ESSENTIAL HYPERTENSION: ICD-10-CM

## 2021-09-22 LAB
BILIRUB UR QL STRIP: NEGATIVE
GLUCOSE UR-MCNC: NEGATIVE MG/DL
KETONES P FAST UR STRIP-MCNC: NEGATIVE MG/DL
PH UR STRIP: 5.5 [PH] (ref 4.6–8)
PROT UR QL STRIP: NEGATIVE
SP GR UR STRIP: 1.03 (ref 1–1.03)
UA UROBILINOGEN AMB POC: NORMAL (ref 0.2–1)
URINALYSIS CLARITY POC: CLEAR
URINALYSIS COLOR POC: YELLOW
URINE BLOOD POC: NEGATIVE
URINE LEUKOCYTES POC: NEGATIVE
URINE NITRITES POC: NEGATIVE

## 2021-09-22 PROCEDURE — 36415 COLL VENOUS BLD VENIPUNCTURE: CPT | Performed by: FAMILY MEDICINE

## 2021-09-23 LAB
A-G RATIO,AGRAT: 1.4 RATIO (ref 1.1–2.6)
ALBUMIN SERPL-MCNC: 4.2 G/DL (ref 3.5–5)
ALP SERPL-CCNC: 94 U/L (ref 25–115)
ALT SERPL-CCNC: 13 U/L (ref 5–40)
ANION GAP SERPL CALC-SCNC: 9 MMOL/L (ref 3–15)
AST SERPL W P-5'-P-CCNC: 13 U/L (ref 10–37)
AVG GLU, 10930: 129 MG/DL (ref 91–123)
BILIRUB SERPL-MCNC: 0.4 MG/DL (ref 0.2–1.2)
BUN SERPL-MCNC: 9 MG/DL (ref 6–22)
CALCIUM SERPL-MCNC: 9.3 MG/DL (ref 8.4–10.5)
CHLORIDE SERPL-SCNC: 99 MMOL/L (ref 98–110)
CHOLEST SERPL-MCNC: 123 MG/DL (ref 110–200)
CO2 SERPL-SCNC: 30 MMOL/L (ref 20–32)
CREAT SERPL-MCNC: 0.9 MG/DL (ref 0.5–1.2)
GFRAA, 66117: >60
GFRNA, 66118: >60
GLOBULIN,GLOB: 3 G/DL (ref 2–4)
GLUCOSE SERPL-MCNC: 134 MG/DL (ref 70–99)
HBA1C MFR BLD HPLC: 6.1 % (ref 4.8–5.6)
HDLC SERPL-MCNC: 3.5 MG/DL (ref 0–5)
HDLC SERPL-MCNC: 35 MG/DL
LDL/HDL RATIO,LDHD: 2
LDLC SERPL CALC-MCNC: 69 MG/DL (ref 50–99)
NON-HDL CHOLESTEROL, 011976: 88 MG/DL
POTASSIUM SERPL-SCNC: 3.8 MMOL/L (ref 3.5–5.5)
PROT SERPL-MCNC: 7.2 G/DL (ref 6.4–8.3)
SODIUM SERPL-SCNC: 138 MMOL/L (ref 133–145)
TRIGL SERPL-MCNC: 97 MG/DL (ref 40–149)
VLDLC SERPL CALC-MCNC: 19 MG/DL (ref 8–30)

## 2021-09-27 NOTE — PROGRESS NOTES
Please let patient know her HbA1c 6.1. This is in the prediabetes range. Lipid panel is normal. We will continue with the current treatment plan.   Zack Henriquez MD

## 2021-11-07 RX ORDER — LABETALOL 300 MG/1
TABLET, FILM COATED ORAL
Qty: 180 TABLET | Refills: 1 | Status: SHIPPED | OUTPATIENT
Start: 2021-11-07 | End: 2022-05-04

## 2021-12-23 ENCOUNTER — VIRTUAL VISIT (OUTPATIENT)
Dept: FAMILY MEDICINE CLINIC | Age: 46
End: 2021-12-23
Payer: MEDICAID

## 2021-12-23 DIAGNOSIS — I10 ESSENTIAL HYPERTENSION: ICD-10-CM

## 2021-12-23 DIAGNOSIS — E66.01 OBESITY, MORBID (HCC): ICD-10-CM

## 2021-12-23 DIAGNOSIS — E78.5 HYPERLIPIDEMIA, UNSPECIFIED HYPERLIPIDEMIA TYPE: ICD-10-CM

## 2021-12-23 DIAGNOSIS — R06.2 WHEEZE: ICD-10-CM

## 2021-12-23 DIAGNOSIS — J30.9 ALLERGIC RHINITIS, UNSPECIFIED SEASONALITY, UNSPECIFIED TRIGGER: Primary | ICD-10-CM

## 2021-12-23 DIAGNOSIS — F31.9 BIPOLAR 1 DISORDER (HCC): ICD-10-CM

## 2021-12-23 PROCEDURE — 99213 OFFICE O/P EST LOW 20 MIN: CPT | Performed by: FAMILY MEDICINE

## 2021-12-23 RX ORDER — LEVOCETIRIZINE DIHYDROCHLORIDE 5 MG/1
5 TABLET, FILM COATED ORAL
Qty: 30 TABLET | Refills: 2 | Status: SHIPPED | OUTPATIENT
Start: 2021-12-23 | End: 2022-01-02

## 2021-12-23 RX ORDER — PREDNISONE 10 MG/1
TABLET ORAL
Qty: 21 TABLET | Refills: 0 | Status: SHIPPED | OUTPATIENT
Start: 2021-12-23 | End: 2022-06-14

## 2021-12-23 RX ORDER — ALBUTEROL SULFATE 90 UG/1
2 AEROSOL, METERED RESPIRATORY (INHALATION)
Qty: 18 G | Refills: 1 | Status: SHIPPED | OUTPATIENT
Start: 2021-12-23 | End: 2022-01-31

## 2021-12-23 NOTE — PROGRESS NOTES
Chief Complaint   Patient presents with    Cold Symptoms     congested     1. \"Have you been to the ER, urgent care clinic since your last visit? Hospitalized since your last visit? \" No    2. \"Have you seen or consulted any other health care providers outside of the [address] [place] [address] since your last visit? \" No     3. For patients aged 39-70: Has the patient had a colonoscopy / FIT/ Cologuard? No     If the patient is female:    4. For patients aged 41-77: Has the patient had a mammogram within the past 2 years? Yes, HM satisfied with blue hyperlink    5. For patients aged 21-65: Has the patient had a pap smear?  Yes, HM satisfied with blue hyperlink no

## 2021-12-23 NOTE — PROGRESS NOTES
Omid Sinclair is a 40 y.o. female who was seen by synchronous (real-time) audio-video technology on 12/23/2021 for Cold Symptoms (congested)        Assessment & Plan:       ICD-10-CM ICD-9-CM    1. Allergic rhinitis, unspecified seasonality, unspecified trigger  J30.9 477.9 levocetirizine (XYZAL) 5 mg tablet   2. Wheeze  R06.2 786.07 albuterol (PROVENTIL HFA, VENTOLIN HFA, PROAIR HFA) 90 mcg/actuation inhaler      predniSONE (STERAPRED DS) 10 mg dose pack   3. Essential hypertension  I10 401.9    4. Hyperlipidemia, unspecified hyperlipidemia type  E78.5 272.4    5. Bipolar 1 disorder (HCC)  F31.9 296.7    6. Obesity, morbid ([address]  E66.01 278.01      Subjective:   Omid Sinclair is seen for acute care. Allergic rhinitis: Patient has nasal congestion, sneezing and postnasal drainage that is clear. She has tried over-the-counter medications without much relief. She has a history of allergies and has used Xyzal in the past.  She is out of this medication. I will send in a refill of the same. She denies fever or chills. Wheeze: Patient has wheezing with occasional cough. This is worse at night. Feels the chest is tight. Cough is nonproductive. Denies hemoptysis or shortness of breath. I will send in albuterol inhaler to use. I will send in prednisone taper. Hypertension: Patient has hypertension. Blood pressure is stable. She is on labetalol and HCTZ. She will continue with these medications. She denies headache, changes in vision or focal weakness. Dyslipidemia: Patient has dyslipidemia. Takes Lipitor daily. Stable on the medication. She will continue to exercise and take a diet in polysaturated fats. We will recheck lipid panel at next visit. Mood disorder: Patient has a history of bipolar disorder. She is followed up by behavioral health specialist.  She takes Abilify. Stable on the medication and tolerating. We will continue current treatment plan. Morbid obesity: Patient has a BMI 50.64. She will intensify lifestyle and dietary modification. No longer taking phentermine. Prior to Admission medications    Medication Sig Start Date End Date Taking? Authorizing Provider   levocetirizine (XYZAL) 5 mg tablet Take 1 Tablet by mouth daily as needed for Allergies. 12/23/21  Yes Shayan Cortez MD   albuterol (PROVENTIL HFA, VENTOLIN HFA, PROAIR HFA) 90 mcg/actuation inhaler Take 2 Puffs by inhalation every four (4) hours as needed for Wheezing. 12/23/21  Yes Shayan Cortez MD   predniSONE (STERAPRED DS) 10 mg dose pack See administration instruction per 10mg dose pack 12/23/21  Yes August Samano MD   labetaloL (NORMODYNE) 300 mg tablet take 1 tablet by mouth twice a day 11/7/21  Yes Shayan Cortez MD   atorvastatin (LIPITOR) 10 mg tablet take 1 tablet by mouth once daily 9/21/21  Yes Shayan Cortez MD   hydroCHLOROthiazide (HYDRODIURIL) 25 mg tablet take 1 tablet by mouth once daily 6/28/21  Yes Shayan Cortez MD   fluticasone propionate (FLONASE) 50 mcg/actuation nasal spray 2 Sprays by Both Nostrils route daily. 3/10/21  Yes August Davey MD   acyclovir (ZOVIRAX) 400 mg tablet Take 1 Tab by mouth two (2) times a day. 8/18/20  Yes Osmany Bolaños NP   MULTIVITAMIN PO Take  by mouth. Yes Provider, Historical   ARIPiprazole (ABILIFY) 15 mg tablet Take 20 mg by mouth daily. Yes Provider, Historical   phentermine (ADIPEX-P) 37.5 mg tablet Take 1 Tablet by mouth every morning. Max Daily Amount: 37.5 mg. Patient not taking: Reported on 9/21/2021 7/13/21   August Davey MD     ROS Review of all systems is negative except as noted above in the HPI.     Objective:     Patient-Reported Vitals 12/23/2021   Patient-Reported Weight 295 lbs   Patient-Reported LMP -   Constitutional: [x]  No apparent distress     Mental status: [x] Alert and awake  [x] Oriented to person/place/time [x] Able to follow commands     HENT: [x] Normocephalic, atraumatic     Pulmonary/Chest: [x] Respiratory effort normal   [x] No visualized signs of difficulty breathing or respiratory distress           Neurological:        [x] No Facial Asymmetry (Cranial nerve 7 motor function) (limited exam due to video visit)                    Psychiatric:       [x] Normal Affect testing note    We discussed the expected course, resolution and complications of the diagnosis(es) in detail. Medication risks, benefits, costs, interactions, and alternatives were discussed as indicated. I advised her to contact the office if her condition worsens, changes or fails to improve as anticipated. She expressed understanding with the diagnosis(es) and plan. Sandhya Serge, was evaluated through a synchronous (real-time) audio-video encounter. The patient (or guardian if applicable) is aware that this is a billable service. Verbal consent to proceed has been obtained within the past 12 months. The visit was conducted pursuant to the emergency declaration under the [facility], [address] [place] [address] authority and the [employer] General Act. Patient identification was verified, and a caregiver was present when appropriate. The patient was located in a state where the provider was credentialed to provide care.       Marsha Mcgrath MD

## 2021-12-29 RX ORDER — HYDROCHLOROTHIAZIDE 25 MG/1
TABLET ORAL
Qty: 90 TABLET | Refills: 1 | Status: SHIPPED | OUTPATIENT
Start: 2021-12-29 | End: 2022-07-05

## 2021-12-31 DIAGNOSIS — J30.9 ALLERGIC RHINITIS, UNSPECIFIED SEASONALITY, UNSPECIFIED TRIGGER: ICD-10-CM

## 2022-01-02 RX ORDER — LEVOCETIRIZINE DIHYDROCHLORIDE 5 MG/1
TABLET, FILM COATED ORAL
Qty: 90 TABLET | Refills: 0 | Status: SHIPPED | OUTPATIENT
Start: 2022-01-02 | End: 2022-01-13 | Stop reason: ALTCHOICE

## 2022-01-05 ENCOUNTER — TELEPHONE (OUTPATIENT)
Dept: FAMILY MEDICINE CLINIC | Age: 47
End: 2022-01-05

## 2022-01-05 NOTE — TELEPHONE ENCOUNTER
Spoke with jam and advised her taht she could check with the local pharmacies and if they can't help to call the health dept to see if anywhere is having a clinic. Patient states she was exposed and now is starting to cough. Patient was advised to quantine  x 5 days or until negative test.  Patient verbalized understanding----- Message from April Nagle sent at 12/30/2021  4:24 PM EST -----  Subject: Message to Provider    QUESTIONS  Information for Provider? Patient was potentially exposed to covid   recently and would like an order for a covid test, and directions where to   go for test. Didn't want an appointment, just to be tested for covid. Patient states she does have slight cough and congestion. Please call   patient back to advise of order placed for covid test. Thank you   ---------------------------------------------------------------------------  --------------  CALL BACK INFO  What is the best way for the office to contact you? OK to leave message on   voicemail  Preferred Call Back Phone Number? [phone]  ---------------------------------------------------------------------------  --------------  SCRIPT ANSWERS  Relationship to Patient?  Self

## 2022-01-13 ENCOUNTER — VIRTUAL VISIT (OUTPATIENT)
Dept: FAMILY MEDICINE CLINIC | Age: 47
End: 2022-01-13

## 2022-01-13 DIAGNOSIS — R06.2 WHEEZE: ICD-10-CM

## 2022-01-13 DIAGNOSIS — I10 ESSENTIAL HYPERTENSION: ICD-10-CM

## 2022-01-13 DIAGNOSIS — J32.4 PANSINUSITIS, UNSPECIFIED CHRONICITY: Primary | ICD-10-CM

## 2022-01-13 DIAGNOSIS — E66.01 OBESITY, MORBID (HCC): ICD-10-CM

## 2022-01-13 DIAGNOSIS — J30.9 ALLERGIC RHINITIS, UNSPECIFIED SEASONALITY, UNSPECIFIED TRIGGER: ICD-10-CM

## 2022-01-13 DIAGNOSIS — F31.9 BIPOLAR 1 DISORDER (HCC): ICD-10-CM

## 2022-01-13 DIAGNOSIS — E78.5 HYPERLIPIDEMIA, UNSPECIFIED HYPERLIPIDEMIA TYPE: ICD-10-CM

## 2022-01-13 PROCEDURE — 99213 OFFICE O/P EST LOW 20 MIN: CPT | Performed by: FAMILY MEDICINE

## 2022-01-13 RX ORDER — FLUTICASONE PROPIONATE 50 MCG
2 SPRAY, SUSPENSION (ML) NASAL DAILY
Qty: 1 EACH | Refills: 0 | Status: SHIPPED | OUTPATIENT
Start: 2022-01-13

## 2022-01-13 RX ORDER — AMOXICILLIN 500 MG/1
500 CAPSULE ORAL 3 TIMES DAILY
Qty: 30 CAPSULE | Refills: 0 | Status: SHIPPED | OUTPATIENT
Start: 2022-01-13 | End: 2022-01-23

## 2022-01-13 RX ORDER — MINERAL OIL
180 ENEMA (ML) RECTAL
Qty: 90 TABLET | Refills: 0 | Status: SHIPPED | OUTPATIENT
Start: 2022-01-13 | End: 2022-06-27

## 2022-01-13 NOTE — PROGRESS NOTES
Joli Ahumada is a 40 y.o. female who was seen by synchronous (real-time) audio-video technology on 1/13/2022 for Follow Up Chronic Condition and Nasal Congestion        Assessment & Plan:       ICD-10-CM ICD-9-CM    1. Pansinusitis, unspecified chronicity  J32.4 473.8 amoxicillin (AMOXIL) 500 mg capsule   2. Allergic rhinitis, unspecified seasonality, unspecified trigger  J30.9 477.9 fluticasone propionate (FLONASE) 50 mcg/actuation nasal spray      fexofenadine (ALLEGRA) 180 mg tablet   3. Wheeze  R06.2 786.07    4. Obesity, morbid ([facility] [address] [address]  E66.01 278.01    5. Bipolar 1 disorder (HCC)  F31.9 296.7    6. Hyperlipidemia, unspecified hyperlipidemia type  E78.5 272.4    7. Essential hypertension  I10 401.9      Subjective:   Joli Ahumada is seen for follow-up care. Pansinusitis: Patient has frontal maxillary sinus pressure. She has postnasal drainage that is mucopurulent in nature. She has malaise and fatigue. Has had double sickening. She has tried taking over-the-counter medications without much relief. She does get occasional headache, fever and chills. Would like medication for this. I will send in Amoxil. I will also send in Flonase to use and the Allegra. I will follow-up in case of no improvement or worsening symptoms. Please: Patient has a history of wheeze. She is on albuterol inhaler. I did prescribe prednisone a few weeks ago for the wheezing. This has improved. She will continue with the albuterol as needed. She denies chest pain or hemoptysis or cough. Mood disorder: Patient has a history of mood disorder. She has been seen by behavioral health specialist.  She is on Abilify. Hypertension: Patient has hypertension. Blood pressure is stable. Denies headache, changes in vision or focal weakness. She is on labetalol and HCTZ. We will continue with these medications. Dyslipidemia: Patient has dyslipidemia. She takes atorvastatin.   She will continue to exercise and take a diet low in polysaturated fats. Morbid obesity: Patient has a BMI of 50.63. She will intensify lifestyle and dietary modification. Prior to Admission medications    Medication Sig Start Date End Date Taking? Authorizing Provider   amoxicillin (AMOXIL) 500 mg capsule Take 1 Capsule by mouth three (3) times daily for 10 days. 1/13/22 1/23/22 Yes August Davey MD   fluticasone propionate (FLONASE) 50 mcg/actuation nasal spray 2 Sprays by Both Nostrils route daily. 1/13/22  Yes August Davey MD   fexofenadine (ALLEGRA) 180 mg tablet Take 1 Tablet by mouth daily as needed for Allergies. 1/13/22  Yes August Davey MD   hydroCHLOROthiazide (HYDRODIURIL) 25 mg tablet take 1 tablet by mouth once daily 12/29/21  Yes Marshall Rhodes MD   albuterol (PROVENTIL HFA, VENTOLIN HFA, PROAIR HFA) 90 mcg/actuation inhaler Take 2 Puffs by inhalation every four (4) hours as needed for Wheezing. 12/23/21  Yes Marshall Rhodes MD   predniSONE (STERAPRED DS) 10 mg dose pack See administration instruction per 10mg dose pack 12/23/21  Yes August Lopez MD   labetaloL (NORMODYNE) 300 mg tablet take 1 tablet by mouth twice a day 11/7/21  Yes Marshall Rhodes MD   atorvastatin (LIPITOR) 10 mg tablet take 1 tablet by mouth once daily 9/21/21  Yes Marshall Rhodes MD   acyclovir (ZOVIRAX) 400 mg tablet Take 1 Tab by mouth two (2) times a day. 8/18/20  Yes Sonya Cespedes NP   MULTIVITAMIN PO Take  by mouth. Yes Provider, Historical   ARIPiprazole (ABILIFY) 15 mg tablet Take 20 mg by mouth daily. Yes Provider, Historical   levocetirizine (XYZAL) 5 mg tablet take 1 tablet by mouth daily if needed for allergies 1/2/22 1/13/22  August Davey MD   fluticasone propionate (FLONASE) 50 mcg/actuation nasal spray 2 Sprays by Both Nostrils route daily. 3/10/21 1/13/22  August Davey MD     ROS Review of all systems is negative except as noted above in the HPI.     Objective:     Patient-Reported Vitals 1/13/2022 Patient-Reported Weight 295 lbs   Patient-Reported LMP 01-   Constitutional: [x] Appears well-developed and well-nourished [x] No apparent distress    Mental status: [x] Alert and awake  [x] Oriented to person/place/time [x] Able to follow commands     HENT: [x] Normocephalic, atraumatic    Pulmonary/Chest: [x] Respiratory effort normal   [x] No visualized signs of difficulty breathing or respiratory distress            Neurological:        [x] No Facial Asymmetry (Cranial nerve 7 motor function) (limited exam due to video visit)                    Psychiatric:       [x] Normal Affect    We discussed the expected course, resolution and complications of the diagnosis(es) in detail. Medication risks, benefits, costs, interactions, and alternatives were discussed as indicated. I advised her to contact the office if her condition worsens, changes or fails to improve as anticipated. She expressed understanding with the diagnosis(es) and plan. Carlos A Devi, was evaluated through a synchronous (real-time) audio-video encounter. The patient (or guardian if applicable) is aware that this is a billable service. Verbal consent to proceed has been obtained within the past 12 months. The visit was conducted pursuant to the emergency declaration under the [facility] [address] authority and the [employer] General Act. Patient identification was verified, and a caregiver was present when appropriate. The patient was located in a state where the provider was credentialed to provide care.       Amirah Sherwood MD

## 2022-01-13 NOTE — PROGRESS NOTES
Chief Complaint   Patient presents with    Follow Up Chronic Condition    Nasal Congestion     1. \"Have you been to the ER, urgent care clinic since your last visit? Hospitalized since your last visit? \" No    2. \"Have you seen or consulted any other health care providers outside of the [address] [place] [address] since your last visit? \" No     3. For patients aged 39-70: Has the patient had a colonoscopy / FIT/ Cologuard? No     If the patient is female:    4. For patients aged 41-77: Has the patient had a mammogram within the past 2 years? Yes, HM satisfied with blue hyperlink    5. For patients aged 21-65: Has the patient had a pap smear?  Yes, HM satisfied with blue hyperlink

## 2022-01-28 DIAGNOSIS — R06.2 WHEEZE: ICD-10-CM

## 2022-01-31 RX ORDER — ALBUTEROL SULFATE 90 UG/1
AEROSOL, METERED RESPIRATORY (INHALATION)
Qty: 8.5 G | Refills: 1 | Status: SHIPPED | OUTPATIENT
Start: 2022-01-31 | End: 2022-07-26

## 2022-03-07 ENCOUNTER — TELEPHONE (OUTPATIENT)
Dept: FAMILY MEDICINE CLINIC | Age: 47
End: 2022-03-07

## 2022-03-07 ENCOUNTER — OFFICE VISIT (OUTPATIENT)
Dept: FAMILY MEDICINE CLINIC | Age: 47
End: 2022-03-07
Payer: MEDICAID

## 2022-03-07 VITALS
RESPIRATION RATE: 20 BRPM | HEIGHT: 64 IN | BODY MASS INDEX: 49.51 KG/M2 | TEMPERATURE: 97.9 F | OXYGEN SATURATION: 96 % | HEART RATE: 80 BPM | DIASTOLIC BLOOD PRESSURE: 76 MMHG | SYSTOLIC BLOOD PRESSURE: 120 MMHG | WEIGHT: 290 LBS

## 2022-03-07 DIAGNOSIS — J32.9 CHRONIC CONGESTION OF PARANASAL SINUS: Primary | ICD-10-CM

## 2022-03-07 DIAGNOSIS — E66.01 OBESITY, MORBID (HCC): ICD-10-CM

## 2022-03-07 DIAGNOSIS — F31.9 BIPOLAR 1 DISORDER (HCC): ICD-10-CM

## 2022-03-07 DIAGNOSIS — I10 ESSENTIAL HYPERTENSION: ICD-10-CM

## 2022-03-07 DIAGNOSIS — E78.5 HYPERLIPIDEMIA, UNSPECIFIED HYPERLIPIDEMIA TYPE: ICD-10-CM

## 2022-03-07 PROCEDURE — 99213 OFFICE O/P EST LOW 20 MIN: CPT | Performed by: FAMILY MEDICINE

## 2022-03-07 RX ORDER — ATORVASTATIN CALCIUM 10 MG/1
10 TABLET, FILM COATED ORAL DAILY
Qty: 90 TABLET | Refills: 1 | Status: SHIPPED | OUTPATIENT
Start: 2022-03-07 | End: 2022-08-02 | Stop reason: SDUPTHER

## 2022-03-07 NOTE — PROGRESS NOTES
HPI  Cherri Franco comes in for f/u care. Sinus congestion: Patient has sinus congestion. This affects her nasal areas, frontal and maxillary sinuses. She has a hard time breathing due to the congestion of the nasal areas. She has tried Flonase and prednisone a different nasal spray might work better. She is considering using Afrin. We discussed the use of Afrin. She can use this for a limited time. She will take it for 3 days and then resume the Flonase. She is also on Allegra and she should take this. Given the chronicity of her sinus congestion I will refer her to the ENT specialist.  She denies fever or chills. She has postnasal drainage that is clear. HTN: Patient has hypertension. Blood pressure is stable. She is on HCTZ and labetalol. Denies headache, changes in vision or focal weakness. We will continue with this medication. Dyslipidemia: Patient has dyslipidemia. She is on atorvastatin 10 mg daily. We will recheck lipid panel. Continue current treatment plan. Bipolar disorder: Patient has a history of bipolar disorder and has been followed up by the behavioral health specialist.  She takes Abilify. Stable on medication. Continue current treatment plan. Morbid obesity: Patient has a BMI of 49.78. She should intensify lifestyle and dietary modification. She has tried this. She is not on any medication but would prefer to hold off on this at the moment. She will consider referral to see dietitian.       Past Medical History  Past Medical History:   Diagnosis Date    Bipolar 1 disorder ([facility] [address]     Borderline diabetes     Genital herpes     Hyperlipidemia     Hyperlipidemia        Surgical History  Past Surgical History:   Procedure Laterality Date    HX  SECTION          Medications  Current Outpatient Medications   Medication Sig Dispense Refill    ProAir HFA 90 mcg/actuation inhaler inhale 2 puffs by mouth every 4 hours if needed for wheezing 8.5 g 1    fluticasone propionate (FLONASE) 50 mcg/actuation nasal spray 2 Sprays by Both Nostrils route daily. 1 Each 0    fexofenadine (ALLEGRA) 180 mg tablet Take 1 Tablet by mouth daily as needed for Allergies. 90 Tablet 0    hydroCHLOROthiazide (HYDRODIURIL) 25 mg tablet take 1 tablet by mouth once daily 90 Tablet 1    predniSONE (STERAPRED DS) 10 mg dose pack See administration instruction per 10mg dose pack 21 Tablet 0    labetaloL (NORMODYNE) 300 mg tablet take 1 tablet by mouth twice a day 180 Tablet 1    atorvastatin (LIPITOR) 10 mg tablet take 1 tablet by mouth once daily 90 Tablet 1    acyclovir (ZOVIRAX) 400 mg tablet Take 1 Tab by mouth two (2) times a day. 60 Tab 5    MULTIVITAMIN PO Take  by mouth.  ARIPiprazole (ABILIFY) 15 mg tablet Take 20 mg by mouth daily.          Allergies  No Known Allergies    Family History  Family History   Problem Relation Age of Onset    Hypertension Mother     Heart Disease Father     Heart Surgery Father     Hypertension Father     Diabetes Maternal Aunt     Asthma Sister     Diabetes Brother        Social History  Social History     Socioeconomic History    Marital status: SINGLE     Spouse name: Not on file    Number of children: Not on file    Years of education: Not on file    Highest education level: Not on file   Occupational History    Not on file   Tobacco Use    Smoking status: Never Smoker    Smokeless tobacco: Never Used   Vaping Use    Vaping Use: Never used   Substance and Sexual Activity    Alcohol use: No     Alcohol/week: 0.0 standard drinks    Drug use: No    Sexual activity: Yes     Partners: Male     Birth control/protection: None   Other Topics Concern     Service No    Blood Transfusions No    Caffeine Concern Yes     Comment: ocassionally     Occupational Exposure Not Asked    Hobby Hazards Not Asked    Sleep Concern No    Stress Concern No    Weight Concern Yes    Special Diet No    Back Care Not Asked    Exercise No    Bike Helmet Not Asked    Seat Belt Yes    Self-Exams Yes   Social History Narrative    Not on file     Social Determinants of Health     Financial Resource Strain:     Difficulty of Paying Living Expenses: Not on file   Food Insecurity:     Worried About Running Out of Food in the Last Year: Not on file    Casandra of Food in the Last Year: Not on file   Transportation Needs:     Lack of Transportation (Medical): Not on file    Lack of Transportation (Non-Medical): Not on file   Physical Activity:     Days of Exercise per Week: Not on file    Minutes of Exercise per Session: Not on file   Stress:     Feeling of Stress : Not on file   Social Connections:     Frequency of Communication with Friends and Family: Not on file    Frequency of Social Gatherings with Friends and Family: Not on file    Attends [employer] Services: Not on file    Active Member of [address] [place] [address] [employer] or Organizations: Not on file    Attends Club or Organization Meetings: Not on file    Marital Status: Not on file   Intimate Partner Violence:     Fear of Current or Ex-Partner: Not on file    Emotionally Abused: Not on file    Physically Abused: Not on file    Sexually Abused: Not on file   Housing Stability:     Unable to Pay for Housing in the Last Year: Not on file    Number of [place] in the Last Year: Not on file    Unstable Housing in the Last Year: Not on file       Review of Systems  Review of Systems - Review of all systems is negative except as noted above in the HPI.     Vital Signs  Visit Vitals  /76 (BP 1 Location: Left upper arm, BP Patient Position: Sitting, BP Cuff Size: Adult)   Pulse 80   Temp 97.9 °F (36.6 °C) (Oral)   Resp 20   Ht 5' 4\" (1.626 m)   Wt 290 lb (131.5 kg)   LMP 03/06/2022   SpO2 96%   BMI 49.78 kg/m²         Physical Exam  Physical Examination: General appearance - alert, well appearing, and in no distress, oriented to person, place, and time, overweight, acyanotic, in no respiratory distress and well hydrated  Mental status - alert, oriented to person, place, and time, affect appropriate to mood  Eyes - pupils equal and reactive, extraocular eye movements intact  Ears - bilateral TM's and external ear canals normal  Nose - mucosal congestion, mucosal erythema, clear rhinorrhea and sinus tenderness noted frontal and maxillary  Mouth - mucous membranes moist, pharynx normal without lesions  Neck - supple, no significant adenopathy  Lymphatics - no palpable lymphadenopathy  Chest - no tachypnea, retractions or cyanosis  Heart - normal rate and regular rhythm, S1 and S2 normal  Abdomen - soft, nontender, nondistended, no masses or organomegaly  Back exam - limited range of motion  Neurological - motor and sensory grossly normal bilaterally  Musculoskeletal - no muscular tenderness noted  Extremities - no pedal edema noted, intact peripheral pulses      Results  Results for orders placed or performed in visit on 09/22/21   HEMOGLOBIN A1C W/O EAG   Result Value Ref Range    Hemoglobin A1c 6.1 (H) 4.8 - 5.6 %    AVG  (H) 91 - 123 mg/dL       ASSESSMENT and PLAN    ICD-10-CM ICD-9-CM    1. Chronic congestion of paranasal sinus  J32.9 473.9 REFERRAL TO ENT-OTOLARYNGOLOGY   2. Bipolar 1 disorder (HCC)  F31.9 296.7    3. Obesity, morbid ([facility] [address]  E66.01 278.01    4. Essential hypertension  I10 401.9    5. Hyperlipidemia, unspecified hyperlipidemia type  E78.5 272.4      lab results and schedule of future lab studies reviewed with patient  reviewed diet, exercise and weight control  reviewed medications and side effects in detail      I have discussed the diagnosis with the patient and the intended plan of care as seen in the above orders. The patient has received an after-visit summary and questions were answered concerning future plans. I have discussed medication, side effects, and warnings with the patient in detail.  The patient verbalized understanding and is in agreement with the plan of care. The patient will contact the office with any additional concerns. I spent at least 30 minutes on this visit with this established patient. Nestor Marie MD    PLEASE NOTE:   This document has been produced using voice recognition software.  Unrecognized errors in transcription may be present

## 2022-03-07 NOTE — PROGRESS NOTES
Chief Complaint   Patient presents with    Follow Up Chronic Condition     still having SOB    Fatigue     restless mind     1. \"Have you been to the ER, urgent care clinic since your last visit? Hospitalized since your last visit? \" No    2. \"Have you seen or consulted any other health care providers outside of the [address] [place] [address] since your last visit? \" No     3. For patients aged 39-70: Has the patient had a colonoscopy / FIT/ Cologuard? No      If the patient is female:    4. For patients aged 41-77: Has the patient had a mammogram within the past 2 years? Yes - no Care Gap present      5. For patients aged 21-65: Has the patient had a pap smear?  Yes - no Care Gap present

## 2022-03-08 VITALS — WEIGHT: 290 LBS | BODY MASS INDEX: 49.51 KG/M2 | HEIGHT: 64 IN

## 2022-03-08 DIAGNOSIS — Z01.818 PRE-OP TESTING: ICD-10-CM

## 2022-03-08 DIAGNOSIS — Z12.11 COLON CANCER SCREENING: Primary | ICD-10-CM

## 2022-03-08 NOTE — PROGRESS NOTES
Colon Screen    Patient was contacted by phone for the documentation reflected in this encounter    Patient: Cristy Calderon MRN: [id number]  SSN: [id number]    YOB: 1975  Age: 40 y.o. Sex: female        Subjective:   Cristy Calderon was referred by PCP. PCP is Sindhu Sheets MD.  Patient referred for colonoscopy for   Screening colonoscopy. Patient denies abdominal pain,rectal pain or bleeding. Abdominal surgeries as described below, specifically  section. Family history as described below, specifically none. Last colonoscopy was in her 19's but no polyps found, only hiatal hernia. Is patient currently on Oxygen: no  Is patient taking blood thinners, fluid pills,or medication for diabetes? yes       Does the patient have a history of any condition listed below? Cardiac, pulmonary or hepatic disease? no  Severe coronary artery disease or aortic stenosis? no  Throat cancer? no  Heart transplant? no  Endocarditis? no  Heart valve replacement? no  Congestive heart failure? no        No Known Allergies    Past Medical History:   Diagnosis Date    Bipolar 1 disorder ([facility] [address]     Borderline diabetes     Genital herpes     Hyperlipidemia      Past Surgical History:   Procedure Laterality Date    HX  SECTION        Family History   Problem Relation Age of Onset    Hypertension Mother     Heart Disease Father     Heart Surgery Father     Hypertension Father     Diabetes Maternal Aunt     Asthma Sister     Diabetes Brother      Social History     Tobacco Use    Smoking status: Never Smoker    Smokeless tobacco: Never Used   Substance Use Topics    Alcohol use: No     Alcohol/week: 0.0 standard drinks      Prior to Admission medications    Medication Sig Start Date End Date Taking? Authorizing Provider   atorvastatin (LIPITOR) 10 mg tablet Take 1 Tablet by mouth daily.  3/7/22  Yes August Davey MD   ProAir HFA 90 mcg/actuation inhaler inhale 2 puffs by mouth every 4 hours if needed for wheezing 1/31/22  Yes August Davey MD   fluticasone propionate (FLONASE) 50 mcg/actuation nasal spray 2 Sprays by Both Nostrils route daily. 1/13/22  Yes August Davey MD   fexofenadine (ALLEGRA) 180 mg tablet Take 1 Tablet by mouth daily as needed for Allergies. 1/13/22  Yes August Davey MD   hydroCHLOROthiazide (HYDRODIURIL) 25 mg tablet take 1 tablet by mouth once daily 12/29/21  Yes Ellis Haro MD   predniSONE (STERAPRED DS) 10 mg dose pack See administration instruction per 10mg dose pack 12/23/21  Yes August Roman MD   labetaloL (NORMODYNE) 300 mg tablet take 1 tablet by mouth twice a day 11/7/21  Yes Ellis Haro MD   acyclovir (ZOVIRAX) 400 mg tablet Take 1 Tab by mouth two (2) times a day. 8/18/20  Yes Abby Ferguson NP   MULTIVITAMIN PO Take  by mouth. Yes Provider, Historical   ARIPiprazole (ABILIFY) 15 mg tablet Take 20 mg by mouth daily. Yes Provider, Historical          Review of Systems   Constitutional: Positive for diaphoresis and malaise/fatigue. Negative for chills, fever and weight loss. Night sweats   HENT: Positive for congestion. Negative for ear discharge, ear pain, hearing loss, nosebleeds, sinus pain, sore throat and tinnitus. Eyes: Negative. Respiratory: Negative. Negative for stridor. Cardiovascular: Negative. Gastrointestinal: Positive for heartburn. Negative for abdominal pain, blood in stool, constipation, diarrhea, melena, nausea and vomiting. Genitourinary: Negative. Musculoskeletal: Negative. Skin: Negative. Neurological: Negative. Endo/Heme/Allergies: Negative. Psychiatric/Behavioral: Negative. Risks colonoscopy described- colon injury, missed lesion, anesthesia problems, bleeding       Karla Carranza, SERAFIN  March 8, 1417  47:33 AM

## 2022-03-18 PROBLEM — E66.01 OBESITY, MORBID (HCC): Status: ACTIVE | Noted: 2018-08-09

## 2022-03-18 PROBLEM — O34.219 PREVIOUS CESAREAN DELIVERY, DELIVERED: Status: ACTIVE | Noted: 2020-07-27

## 2022-03-19 PROBLEM — R60.0 EDEMA OF BOTH LOWER LEGS: Status: ACTIVE | Noted: 2020-07-30

## 2022-03-19 PROBLEM — B00.9 HSV-2 INFECTION: Status: ACTIVE | Noted: 2020-08-18

## 2022-03-19 PROBLEM — O99.019 ANTEPARTUM ANEMIA: Status: ACTIVE | Noted: 2020-07-30

## 2022-04-01 ENCOUNTER — TRANSCRIBE ORDER (OUTPATIENT)
Dept: SCHEDULING | Age: 47
End: 2022-04-01

## 2022-04-01 DIAGNOSIS — Z12.31 VISIT FOR SCREENING MAMMOGRAM: Primary | ICD-10-CM

## 2022-04-23 ENCOUNTER — HOSPITAL ENCOUNTER (OUTPATIENT)
Dept: MAMMOGRAPHY | Age: 47
Discharge: HOME | End: 2022-04-23
Attending: FAMILY MEDICINE
Payer: MEDICAID

## 2022-04-23 DIAGNOSIS — Z12.31 VISIT FOR SCREENING MAMMOGRAM: ICD-10-CM

## 2022-04-23 PROCEDURE — 77067 SCR MAMMO BI INCL CAD: CPT

## 2022-05-04 RX ORDER — LABETALOL 300 MG/1
TABLET, FILM COATED ORAL
Qty: 180 TABLET | Refills: 1 | Status: SHIPPED | OUTPATIENT
Start: 2022-05-04 | End: 2022-06-27 | Stop reason: SDUPTHER

## 2022-05-25 ENCOUNTER — HOSPITAL ENCOUNTER (OUTPATIENT)
Dept: LAB | Age: 47
Discharge: HOME | End: 2022-05-25
Payer: MEDICAID

## 2022-05-25 DIAGNOSIS — Z12.11 COLON CANCER SCREENING: ICD-10-CM

## 2022-05-25 LAB
ATRIAL RATE: 70 BPM
CALCULATED P AXIS, ECG09: -15 DEGREES
CALCULATED R AXIS, ECG10: 48 DEGREES
CALCULATED T AXIS, ECG11: 8 DEGREES
DIAGNOSIS, 93000: NORMAL
P-R INTERVAL, ECG05: 176 MS
Q-T INTERVAL, ECG07: 402 MS
QRS DURATION, ECG06: 86 MS
QTC CALCULATION (BEZET), ECG08: 434 MS
VENTRICULAR RATE, ECG03: 70 BPM

## 2022-05-25 PROCEDURE — 93005 ELECTROCARDIOGRAM TRACING: CPT

## 2022-06-09 ENCOUNTER — OFFICE VISIT (OUTPATIENT)
Dept: FAMILY MEDICINE CLINIC | Age: 47
End: 2022-06-09
Payer: MEDICAID

## 2022-06-09 VITALS
HEART RATE: 82 BPM | OXYGEN SATURATION: 96 % | DIASTOLIC BLOOD PRESSURE: 72 MMHG | TEMPERATURE: 97.5 F | HEIGHT: 64 IN | BODY MASS INDEX: 49.34 KG/M2 | SYSTOLIC BLOOD PRESSURE: 120 MMHG | WEIGHT: 289 LBS | RESPIRATION RATE: 18 BRPM

## 2022-06-09 DIAGNOSIS — J32.9 CHRONIC CONGESTION OF PARANASAL SINUS: ICD-10-CM

## 2022-06-09 DIAGNOSIS — E78.5 HYPERLIPIDEMIA, UNSPECIFIED HYPERLIPIDEMIA TYPE: ICD-10-CM

## 2022-06-09 DIAGNOSIS — E66.01 OBESITY, MORBID (HCC): ICD-10-CM

## 2022-06-09 DIAGNOSIS — L91.0 HYPERTROPHIC SCAR: Primary | ICD-10-CM

## 2022-06-09 DIAGNOSIS — F31.9 BIPOLAR 1 DISORDER (HCC): ICD-10-CM

## 2022-06-09 DIAGNOSIS — I10 ESSENTIAL HYPERTENSION: ICD-10-CM

## 2022-06-09 PROBLEM — J32.2 CHRONIC ETHMOIDAL SINUSITIS: Status: ACTIVE | Noted: 2022-06-09

## 2022-06-09 PROCEDURE — 99214 OFFICE O/P EST MOD 30 MIN: CPT | Performed by: FAMILY MEDICINE

## 2022-06-09 NOTE — PROGRESS NOTES
HPI  Boris Pineda comes in for f/u care. Hypertrophic scar: Patient has hypertrophic scar left upper breast area. This seems to be getting bigger. Would like to have this cast removed. I will refer to surgery. Sinusitis, chronic: Patient has chronic sinusitis. Gets nasal congestion and sneezing. Has postnasal drainage. She is on Flonase and Allegra. Will refer to ENT specialist.  Bipolar disorder: Patient has bipolar disorder. She is on aripiprazole. Stable on the medication. She is seen by behavioral health specialist.  Continue current treatment plan. HTN: Patient has hypertension. She is on labetalol and HCTZ. Blood pressure is stable. Continue current treatment plan. Dyslipidemia: Patient has dyslipidemia. She is on atorvastatin. She will exercise and take a diet low in polysaturated fats. Morbid obesity: Patient has morbid obesity with a BMI of 49.61. She will intensify lifestyle and dietary modification. Past Medical History  Past Medical History:   Diagnosis Date    Bipolar 1 disorder ([facility] [address]     Borderline diabetes     Genital herpes     Hyperlipidemia        Surgical History  Past Surgical History:   Procedure Laterality Date    HX  SECTION          Medications  Current Outpatient Medications   Medication Sig Dispense Refill    labetaloL (NORMODYNE) 300 mg tablet take 1 tablet by mouth twice a day 180 Tablet 1    atorvastatin (LIPITOR) 10 mg tablet Take 1 Tablet by mouth daily. 90 Tablet 1    ProAir HFA 90 mcg/actuation inhaler inhale 2 puffs by mouth every 4 hours if needed for wheezing 8.5 g 1    fluticasone propionate (FLONASE) 50 mcg/actuation nasal spray 2 Sprays by Both Nostrils route daily. 1 Each 0    fexofenadine (ALLEGRA) 180 mg tablet Take 1 Tablet by mouth daily as needed for Allergies.  90 Tablet 0    hydroCHLOROthiazide (HYDRODIURIL) 25 mg tablet take 1 tablet by mouth once daily 90 Tablet 1    acyclovir (ZOVIRAX) 400 mg tablet Take 1 Tab by mouth two (2) times a day. 60 Tab 5    MULTIVITAMIN PO Take  by mouth.  ARIPiprazole (ABILIFY) 15 mg tablet Take 20 mg by mouth daily.  predniSONE (STERAPRED DS) 10 mg dose pack See administration instruction per 10mg dose pack (Patient not taking: Reported on 6/9/2022) 21 Tablet 0       Allergies  No Known Allergies    Family History  Family History   Problem Relation Age of Onset    Hypertension Mother     Heart Disease Father     Heart Surgery Father     Hypertension Father     Diabetes Maternal Aunt     Asthma Sister     Diabetes Brother        Social History  Social History     Socioeconomic History    Marital status: SINGLE     Spouse name: Not on file    Number of children: Not on file    Years of education: Not on file    Highest education level: Not on file   Occupational History    Not on file   Tobacco Use    Smoking status: Never Smoker    Smokeless tobacco: Never Used   Vaping Use    Vaping Use: Never used   Substance and Sexual Activity    Alcohol use: No     Alcohol/week: 0.0 standard drinks    Drug use: No    Sexual activity: Yes     Partners: Male     Birth control/protection: None   Other Topics Concern     Service No    Blood Transfusions No    Caffeine Concern Yes     Comment: ocassionally     Occupational Exposure Not Asked    Hobby Hazards Not Asked    Sleep Concern No    Stress Concern No    Weight Concern Yes    Special Diet No    Back Care Not Asked    Exercise No    Bike Helmet Not Asked    Seat Belt Yes    Self-Exams Yes   Social History Narrative    Not on file     Social Determinants of Health     Financial Resource Strain:     Difficulty of Paying Living Expenses: Not on file   Food Insecurity:     Worried About Running Out of Food in the Last Year: Not on file    Casandra of Food in the Last Year: Not on file   Transportation Needs:     Lack of Transportation (Medical): Not on file    Lack of Transportation (Non-Medical):  Not on file   Physical Activity:     Days of Exercise per Week: Not on file    Minutes of Exercise per Session: Not on file   Stress:     Feeling of Stress : Not on file   Social Connections:     Frequency of Communication with Friends and Family: Not on file    Frequency of Social Gatherings with Friends and Family: Not on file    Attends [employer] Services: Not on file    Active Member of [address] [place] [address] or Organizations: Not on file    Attends Club or Organization Meetings: Not on file    Marital Status: Not on file   Intimate Partner Violence:     Fear of Current or Ex-Partner: Not on file    Emotionally Abused: Not on file    Physically Abused: Not on file    Sexually Abused: Not on file   Housing Stability:     Unable to Pay for Housing in the Last Year: Not on file    Number of [place] in the Last Year: Not on file    Unstable Housing in the Last Year: Not on file       Review of Systems  Review of Systems - Review of all systems is negative except as noted above in the HPI.     Vital Signs  Visit Vitals  /72 (BP 1 Location: Left upper arm, BP Patient Position: Standing, BP Cuff Size: Adult)   Pulse 82   Temp 97.5 °F (36.4 °C) (Temporal)   Resp 18   Ht 5' 4\" (1.626 m)   Wt 289 lb (131.1 kg)   LMP 06/07/2022   SpO2 96%   BMI 49.61 kg/m²         Physical Exam  Physical Examination: General appearance - oriented to person, place, and time, overweight and acyanotic, in no respiratory distress  Mental status - affect appropriate to mood  Nose - mucosal congestion, mucosal erythema and clear rhinorrhea  Mouth - mucous membranes moist, pharynx normal without lesions  Neck - supple, no significant adenopathy  Lymphatics - no palpable lymphadenopathy, no hepatosplenomegaly  Chest - no tachypnea, retractions or cyanosis  Heart - S1 and S2 normal  Abdomen - no rebound tenderness noted  Back exam - limited range of motion  Neurological - abnormal neurological exam unchanged from prior examinations  Musculoskeletal - osteoarthritic changes noted in both hands  Extremities - no pedal edema noted, intact peripheral pulses  Skin -hypertrophic scar on the upper aspect of left breast and chest wall. Results  Results for orders placed or performed during the hospital encounter of 05/25/22   EKG, 12 LEAD, INITIAL   Result Value Ref Range    Ventricular Rate 70 BPM    Atrial Rate 70 BPM    P-R Interval 176 ms    QRS Duration 86 ms    Q-T Interval 402 ms    QTC Calculation (Bezet) 434 ms    Calculated P Axis -15 degrees    Calculated R Axis 48 degrees    Calculated T Axis 8 degrees    Diagnosis       Normal sinus rhythm  Normal ECG  No previous ECGs available  Confirmed by Aliza Navarro MD, Nasra Hdez ([id number]) on 5/25/2022 2:52:03 PM         ASSESSMENT and PLAN    ICD-10-CM ICD-9-CM    1. Hypertrophic scar  L91.0 701.4 REFERRAL TO SURGERY   2. Bipolar 1 disorder (HCC)  F31.9 296.7    3. Chronic congestion of paranasal sinus  J32.9 473.9    4. Obesity, morbid ([address]  E66.01 278.01    5. Essential hypertension  I10 401.9    6. Hyperlipidemia, unspecified hyperlipidemia type  E78.5 272.4      lab results and schedule of future lab studies reviewed with patient  reviewed diet, exercise and weight control  cardiovascular risk and specific lipid/LDL goals reviewed  reviewed medications and side effects in detail      I have discussed the diagnosis with the patient and the intended plan of care as seen in the above orders. The patient has received an after-visit summary and questions were answered concerning future plans. I have discussed medication, side effects, and warnings with the patient in detail. The patient verbalized understanding and is in agreement with the plan of care. The patient will contact the office with any additional concerns. Janell Berrios MD    PLEASE NOTE:   This document has been produced using voice recognition software.  Unrecognized errors in transcription may be present

## 2022-06-09 NOTE — PROGRESS NOTES
Chief Complaint   Patient presents with    Follow Up Chronic Condition    Rash     1. \"Have you been to the ER, urgent care clinic since your last visit? Hospitalized since your last visit? \" No    2. \"Have you seen or consulted any other health care providers outside of the [address] [place] [address] since your last visit? \" No     3. For patients aged 39-70: Has the patient had a colonoscopy / FIT/ Cologuard? No      If the patient is female:    4. For patients aged 41-77: Has the patient had a mammogram within the past 2 years? Yes - no Care Gap present      5. For patients aged 21-65: Has the patient had a pap smear?  Yes - no Care Gap present

## 2022-06-27 ENCOUNTER — OFFICE VISIT (OUTPATIENT)
Dept: FAMILY MEDICINE CLINIC | Age: 47
End: 2022-06-27
Payer: MEDICAID

## 2022-06-27 VITALS
OXYGEN SATURATION: 100 % | WEIGHT: 292 LBS | TEMPERATURE: 98.1 F | DIASTOLIC BLOOD PRESSURE: 81 MMHG | HEART RATE: 82 BPM | HEIGHT: 64 IN | BODY MASS INDEX: 49.85 KG/M2 | SYSTOLIC BLOOD PRESSURE: 133 MMHG | RESPIRATION RATE: 20 BRPM

## 2022-06-27 DIAGNOSIS — R10.30 LOWER ABDOMINAL PAIN: ICD-10-CM

## 2022-06-27 DIAGNOSIS — N89.8 VAGINAL DISCHARGE: ICD-10-CM

## 2022-06-27 DIAGNOSIS — J30.9 ALLERGIC RHINITIS, UNSPECIFIED SEASONALITY, UNSPECIFIED TRIGGER: ICD-10-CM

## 2022-06-27 DIAGNOSIS — I10 ESSENTIAL HYPERTENSION: ICD-10-CM

## 2022-06-27 LAB
BILIRUB UR QL STRIP: NEGATIVE
GLUCOSE UR-MCNC: NEGATIVE MG/DL
KETONES P FAST UR STRIP-MCNC: NEGATIVE MG/DL
PH UR STRIP: 6 [PH] (ref 4.6–8)
PROT UR QL STRIP: NEGATIVE
SP GR UR STRIP: 1.02 (ref 1–1.03)
UA UROBILINOGEN AMB POC: NORMAL (ref 0.2–1)
URINALYSIS CLARITY POC: CLEAR
URINALYSIS COLOR POC: YELLOW
URINE BLOOD POC: NEGATIVE
URINE LEUKOCYTES POC: NEGATIVE
URINE NITRITES POC: NEGATIVE

## 2022-06-27 PROCEDURE — 99213 OFFICE O/P EST LOW 20 MIN: CPT | Performed by: FAMILY MEDICINE

## 2022-06-27 PROCEDURE — 81001 URINALYSIS AUTO W/SCOPE: CPT | Performed by: FAMILY MEDICINE

## 2022-06-27 RX ORDER — LABETALOL 300 MG/1
300 TABLET, FILM COATED ORAL 2 TIMES DAILY
Qty: 180 TABLET | Refills: 1 | Status: SHIPPED | OUTPATIENT
Start: 2022-06-27 | End: 2022-09-25

## 2022-06-27 RX ORDER — LEVOCETIRIZINE DIHYDROCHLORIDE 5 MG/1
5 TABLET, FILM COATED ORAL DAILY
Qty: 90 TABLET | Refills: 1 | Status: SHIPPED | OUTPATIENT
Start: 2022-06-27 | End: 2022-09-25

## 2022-06-27 NOTE — PATIENT INSTRUCTIONS
Bacterial Vaginosis: Care Instructions  Overview     Bacterial vaginosis is a type of vaginal infection. It is caused by excess growth of certain bacteria that are normally found in the vagina. Symptoms can include itching, swelling, pain when you urinate or have sex, and a gray or yellow discharge with a \"fishy\" odor. It is not considered an infection that is spread through sexual contact. Symptoms can be annoying and uncomfortable. But bacterial vaginosis does not usually cause other health problems. However, if you have it while you are pregnant, it can cause complications. While the infection may go away on its own, most doctors use antibiotics to treat it. You may have been prescribed pills or vaginal cream. With treatment, bacterial vaginosis usually clears up in 5 to 7 days. Follow-up care is a key part of your treatment and safety. Be sure to make and go to all appointments, and call your doctor if you are having problems. It's also a good idea to know your test results and keep a list of the medicines you take. How can you care for yourself at home? · Take your antibiotics as directed. Do not stop taking them just because you feel better. You need to take the full course of antibiotics. · Do not eat or drink anything that contains alcohol if you are taking metronidazole or tinidazole. · Keep using your medicine if you start your period. Use pads instead of tampons while using a vaginal cream or suppository. Tampons can absorb the medicine. · Wear loose cotton clothing. Do not wear nylon and other materials that hold body heat and moisture close to the skin. · Do not scratch. Relieve itching with a cold pack or a cool bath. · Do not wash your vaginal area more than once a day. Use plain water or a mild, unscented soap. Do not douche. When should you call for help?   Watch closely for changes in your health, and be sure to contact your doctor if:    · You have unexpected vaginal bleeding.     · You have a fever.     · You have new or increased pain in your vagina or pelvis.     · You are not getting better after 1 week.     · Your symptoms return after you finish the course of your medicine. Where can you learn more? Go to http://www.gray.com/  Enter X360 in the search box to learn more about \"Bacterial Vaginosis: Care Instructions. \"  Current as of: November 22, 2021               Content Version: 13.2  © 2006-2022 [employer]. Care instructions adapted under license by [employer] (which disclaims liability or warranty for this information). If you have questions about a medical condition or this instruction, always ask your healthcare professional. [address] any warranty or liability for your use of this information.

## 2022-06-27 NOTE — PROGRESS NOTES
HPI  This is a 60-year-old -American female with past medical history significant for bipolar 1 disorder, prediabetes, genital herpes, hyperlipidemia who is here to follow-up on acute condition. Abdominal pain/vaginal discharge  Patient states that for the last week she has been having lower abdominal pain which is associated with white vaginal discharge. Denies any burning on urination, nausea, vomiting, vaginal bleeding. Urine dip in the office was negative for UTI. Nuswab completed today we will follow up results and treat accordingly. HTN  Patient is on labetalol 300 mg twice daily, hydrochlorothiazide 25 mg daily. Patient states that she needs a refill on her labetalol. Denies any headaches, blurred vision, chest pain, shortness of breath, palpitations, swelling in legs, claudication. Seasonal allergies  Patient states that she would like allergy medication that is covered by insurance as the Sapna Hillcock is not covered. I will prescribed levocetrizine instead and advised patient to take at bedtime as it can make her drowsy. Past Medical History  Past Medical History:   Diagnosis Date    Bipolar 1 disorder ([facility] [address]     Borderline diabetes     Genital herpes     Hyperlipidemia        Surgical History  Past Surgical History:   Procedure Laterality Date    HX  SECTION          Medications  Current Outpatient Medications   Medication Sig Dispense Refill    labetaloL (NORMODYNE) 300 mg tablet Take 1 Tablet by mouth two (2) times a day for 90 days. 180 Tablet 1    levocetirizine (XYZAL) 5 mg tablet Take 1 Tablet by mouth daily for 90 days. 90 Tablet 1    atorvastatin (LIPITOR) 10 mg tablet Take 1 Tablet by mouth daily. 90 Tablet 1    ProAir HFA 90 mcg/actuation inhaler inhale 2 puffs by mouth every 4 hours if needed for wheezing 8.5 g 1    fluticasone propionate (FLONASE) 50 mcg/actuation nasal spray 2 Sprays by Both Nostrils route daily.  1 Each 0    hydroCHLOROthiazide (HYDRODIURIL) 25 mg tablet take 1 tablet by mouth once daily 90 Tablet 1    acyclovir (ZOVIRAX) 400 mg tablet Take 1 Tab by mouth two (2) times a day. 60 Tab 5    MULTIVITAMIN PO Take  by mouth.  ARIPiprazole (ABILIFY) 15 mg tablet Take 20 mg by mouth daily. Allergies  No Known Allergies    Family History  Family History   Problem Relation Age of Onset    Hypertension Mother     Heart Disease Father     Heart Surgery Father     Hypertension Father     Diabetes Maternal Aunt     Asthma Sister     Diabetes Brother        Social History  Social History     Socioeconomic History    Marital status: SINGLE     Spouse name: Not on file    Number of children: Not on file    Years of education: Not on file    Highest education level: Not on file   Occupational History    Not on file   Tobacco Use    Smoking status: Never Smoker    Smokeless tobacco: Never Used   Vaping Use    Vaping Use: Never used   Substance and Sexual Activity    Alcohol use: No     Alcohol/week: 0.0 standard drinks    Drug use: No    Sexual activity: Yes     Partners: Male     Birth control/protection: None   Other Topics Concern     Service No    Blood Transfusions No    Caffeine Concern Yes     Comment: ocassionally     Occupational Exposure Not Asked    Hobby Hazards Not Asked    Sleep Concern No    Stress Concern No    Weight Concern Yes    Special Diet No    Back Care Not Asked    Exercise No    Bike Helmet Not Asked    Seat Belt Yes    Self-Exams Yes   Social History Narrative    Not on file     Social Determinants of Health     Financial Resource Strain:     Difficulty of Paying Living Expenses: Not on file   Food Insecurity:     Worried About Running Out of Food in the Last Year: Not on file    Casandra of Food in the Last Year: Not on file   Transportation Needs:     Lack of Transportation (Medical): Not on file    Lack of Transportation (Non-Medical):  Not on file   Physical Activity:  Days of Exercise per Week: Not on file    Minutes of Exercise per Session: Not on file   Stress:     Feeling of Stress : Not on file   Social Connections:     Frequency of Communication with Friends and Family: Not on file    Frequency of Social Gatherings with Friends and Family: Not on file    Attends Roman [employer] Services: Not on file    Active Member of [address] [place] [address] [employer] or Organizations: Not on file    Attends Club or Organization Meetings: Not on file    Marital Status: Not on file   Intimate Partner Violence:     Fear of Current or Ex-Partner: Not on file    Emotionally Abused: Not on file    Physically Abused: Not on file    Sexually Abused: Not on file   Housing Stability:     Unable to Pay for Housing in the Last Year: Not on file    Number of [place] in the Last Year: Not on file    Unstable Housing in the Last Year: Not on file       Review of Systems  Review of Systems   Constitutional: Negative for chills and fever. Respiratory: Negative for cough and shortness of breath. Cardiovascular: Negative for chest pain and leg swelling. Gastrointestinal: Positive for abdominal pain. Negative for nausea and vomiting. Genitourinary: Negative for dysuria, flank pain, frequency, hematuria and urgency. Vaginal discharge present    Skin: Negative for rash. Neurological: Negative for dizziness and headaches. Vital Signs  Visit Vitals  /81 (BP 1 Location: Left upper arm, BP Patient Position: Sitting, BP Cuff Size: Large adult)   Pulse 82   Temp 98.1 °F (36.7 °C) (Temporal)   Resp 20   Ht 5' 4\" (1.626 m)   Wt 292 lb (132.5 kg)   LMP 06/07/2022   SpO2 100%   BMI 50.12 kg/m²         Physical Exam  Physical Exam  Vitals reviewed. Constitutional:       Appearance: Normal appearance. HENT:      Head: Normocephalic and atraumatic.       Right Ear: External ear normal.      Left Ear: External ear normal.      Nose: Nose normal.      Mouth/Throat:      Mouth: Mucous membranes are moist.   Eyes:      Extraocular Movements: Extraocular movements intact. Conjunctiva/sclera: Conjunctivae normal.   Cardiovascular:      Rate and Rhythm: Normal rate and regular rhythm. Pulses: Normal pulses. Heart sounds: Normal heart sounds. No murmur heard. No gallop. Pulmonary:      Effort: Pulmonary effort is normal. No respiratory distress. Breath sounds: Normal breath sounds. No wheezing. Abdominal:      General: Bowel sounds are normal. There is no distension. Palpations: Abdomen is soft. There is no mass. Tenderness: There is abdominal tenderness. There is no right CVA tenderness, left CVA tenderness, guarding or rebound. Hernia: No hernia is present. Comments: Tender in lower abdomen   Musculoskeletal:         General: Normal range of motion. Cervical back: Normal range of motion. Right lower leg: No edema. Left lower leg: No edema. Skin:     General: Skin is warm. Coloration: Skin is not jaundiced. Findings: No rash. Neurological:      General: No focal deficit present. Mental Status: She is alert and oriented to person, place, and time. Cranial Nerves: No cranial nerve deficit. Motor: No weakness. Gait: Gait normal.   Psychiatric:         Mood and Affect: Mood normal.         Behavior: Behavior normal.                Results  Results for orders placed or performed during the hospital encounter of 05/25/22   EKG, 12 LEAD, INITIAL   Result Value Ref Range    Ventricular Rate 70 BPM    Atrial Rate 70 BPM    P-R Interval 176 ms    QRS Duration 86 ms    Q-T Interval 402 ms    QTC Calculation (Bezet) 434 ms    Calculated P Axis -15 degrees    Calculated R Axis 48 degrees    Calculated T Axis 8 degrees    Diagnosis       Normal sinus rhythm  Normal ECG  No previous ECGs available  Confirmed by Ewelina Mcclellan MD, Sharmila Castro ([id number]) on 5/25/2022 2:52:03 PM         ASSESSMENT and PLAN  1. Lower abdominal pain/2.  Vaginal discharge  - AMB POC URINALYSIS DIP STICK AUTO W/ MICRO: negative for infection or blood  - NUSWAB VAGINITIS + HSV; Future  -Will treat once we have results of nuswab    3. Essential hypertension  - labetaloL (NORMODYNE) 300 mg tablet; Take 1 Tablet by mouth two (2) times a day for 90 days. Dispense: 180 Tablet; Refill: 1  -medication regimen: Continue labetalol 300 mg twice daily and hydrochlorothiazide 25 mg daily.  -counseled about diet rich in green leafy vegetables/protein, decrease intake of red meat/sodium/and cholesterol.   -counseled about 150 min of moderate intensity exercise a week. Alternating between cardio and strength training.   -counseled about medication adherence and weight loss  -counseled to keep log of blood pressure at home and bring to next appointment   -Return visit: follow up with PCP in 3 months    4. Allergic rhinitis, unspecified seasonality, unspecified trigger  - levocetirizine (XYZAL) 5 mg tablet; Take 1 Tablet by mouth daily for 90 days. Dispense: 90 Tablet; Refill: 1           Follow-up and Dispositions    · Return in about 3 months (around 9/27/2022), or if symptoms worsen or fail to improve, for follow up PCP in october-appointment already scheduled. I have discussed the diagnosis with the patient and the intended plan of care as seen in the above orders. The patient has received an after-visit summary and questions were answered concerning future plans. I have discussed medication, side effects, and warnings with the patient in detail. The patient verbalized understanding and is in agreement with the plan of care. The patient will contact the office with any additional concerns. I spent at least 30 minutes on this visit with this established patient. Willem Senior MD    PLEASE NOTE:   This document has been produced using voice recognition software.  Unrecognized errors in transcription may be present

## 2022-06-30 DIAGNOSIS — B96.89 BACTERIAL VAGINOSIS: Primary | ICD-10-CM

## 2022-06-30 DIAGNOSIS — N76.0 BACTERIAL VAGINOSIS: Primary | ICD-10-CM

## 2022-06-30 LAB
BACTERIAL VAGINOSIS, NAA: POSITIVE
CANDIDA GLABRATA, NAA, 180057: NEGATIVE
CANDIDA SPECIES, NAA: NEGATIVE
CHLAMYDIA TRACHOMATIS, NAA, 180097: NEGATIVE
HERPES 1 (THINPREP / APTIMA SWAB): NEGATIVE
HERPES 2 (THINPREP / APTIMA SWAB): NEGATIVE
NEISSERIA GONORRHOEAE, NAA, 180104: NEGATIVE
TRICH VAG BY NAA, 180087: NEGATIVE

## 2022-06-30 RX ORDER — METRONIDAZOLE 500 MG/1
500 TABLET ORAL 2 TIMES DAILY
Qty: 10 TABLET | Refills: 0 | Status: SHIPPED | OUTPATIENT
Start: 2022-06-30 | End: 2022-07-05

## 2022-06-30 NOTE — PROGRESS NOTES
Patient is a positive for bacterial vaginosis everything else was negative. Prescription for metronidazole sent to pharmacy on file.

## 2022-06-30 NOTE — PROGRESS NOTES
Would you mind letting this patient know that she is positive for bacterial vaginosis and everything else was negative. I will send in a prescription for metronidazole to her pharmacy. It is to be taken twice a day for 5 days and she should not mix it with alcohol as this could result in very bad side effects. Please also advised her that it is due to imbalance in vaginal pH that has led to BV.  Advised her to wear cotton undergarments that are more breathable and avoid vaginal douching/bubbles etc. Thank you

## 2022-07-01 NOTE — PROGRESS NOTES
After obtaining identifiers  patient was made aware of all lab results and recommendations.   Patient verbalized undersdtanding

## 2022-07-16 ENCOUNTER — HOSPITAL ENCOUNTER (OUTPATIENT)
Dept: LAB | Age: 47
Discharge: HOME | End: 2022-07-16

## 2022-07-16 LAB
ANION GAP SERPL CALC-SCNC: 10 MMOL/L (ref 3–15)
CHLORIDE SERPL-SCNC: 100 MMOL/L (ref 98–110)
CO2 SERPL-SCNC: 28 MMOL/L (ref 20–32)
POTASSIUM SERPL-SCNC: 3.6 MMOL/L (ref 3.5–5.5)
SENTARA SPECIMEN COL,SENBCF: NORMAL
SODIUM SERPL-SCNC: 138 MMOL/L (ref 133–145)

## 2022-07-16 PROCEDURE — 99001 SPECIMEN HANDLING PT-LAB: CPT

## 2022-07-19 DIAGNOSIS — Z12.11 COLON CANCER SCREENING: Primary | ICD-10-CM

## 2022-07-19 RX ORDER — POLYETHYLENE GLYCOL 3350 17 G/17G
POWDER, FOR SOLUTION ORAL
Qty: 238 G | Refills: 0 | Status: SHIPPED | OUTPATIENT
Start: 2022-07-19 | End: 2022-09-08

## 2022-07-19 RX ORDER — BISACODYL 5 MG
TABLET, DELAYED RELEASE (ENTERIC COATED) ORAL
Qty: 4 TABLET | Refills: 0 | Status: SHIPPED | OUTPATIENT
Start: 2022-07-19 | End: 2022-09-08

## 2022-07-26 ENCOUNTER — ANESTHESIA EVENT (OUTPATIENT)
Dept: ENDOSCOPY | Age: 47
End: 2022-07-26
Payer: MEDICAID

## 2022-07-26 ENCOUNTER — HOSPITAL ENCOUNTER (OUTPATIENT)
Dept: LAB | Age: 47
Discharge: HOME | End: 2022-07-26

## 2022-07-26 ENCOUNTER — TELEPHONE (OUTPATIENT)
Dept: FAMILY MEDICINE CLINIC | Age: 47
End: 2022-07-26

## 2022-07-26 DIAGNOSIS — R73.9 HYPERGLYCEMIA: ICD-10-CM

## 2022-07-26 DIAGNOSIS — I10 ESSENTIAL HYPERTENSION: Primary | ICD-10-CM

## 2022-07-26 DIAGNOSIS — R73.03 PREDIABETES: ICD-10-CM

## 2022-07-26 DIAGNOSIS — E78.5 HYPERLIPIDEMIA, UNSPECIFIED HYPERLIPIDEMIA TYPE: ICD-10-CM

## 2022-07-26 LAB — SENTARA SPECIMEN COL,SENBCF: NORMAL

## 2022-07-26 PROCEDURE — 99001 SPECIMEN HANDLING PT-LAB: CPT

## 2022-07-26 NOTE — TELEPHONE ENCOUNTER
Spoke with patient and advised labs were ordered and to have done approx 1-2 weeks before scheduled appt with provider.   Patient verbalized understanding

## 2022-07-26 NOTE — TELEPHONE ENCOUNTER
Spoke to patient to see if provider was going to write follow up lab orders for her to get before her follow up appt.     Please advise    Thank you

## 2022-07-26 NOTE — PERIOP NOTES
Angelina Goodman PAT phone assessment completed on 7/26/2022. The following instructions were reviewed with Angelina Goodman and she  verbalized understanding. Do NOT eat or drink anything, including candy, gum, or ice chips after midnight on 7/27/2022, unless you have specific instructions from your surgeon or anesthesia provider to do so. You may brush your teeth before coming to the hospital.  No smoking 24 hours prior to the day of surgery. No alcohol 24 hours prior to the day of surgery. No recreational drugs for one week prior to the day of surgery. Leave all valuables, including money/purse, at home. Remove all jewelry, nail polish, acrylic nails, and makeup (including mascara); no lotions powders, deodorant, or perfume/cologne/after shave on the skin. Glasses/contact lenses and dentures may be worn to the hospital.  They will be removed prior to surgery. Call your doctor if symptoms of a cold or illness develop within 24-48 hours prior to your surgery. 10.  AN ADULT MUST DRIVE YOU HOME AFTER OUTPATIENT SURGERY. 11.  If you are having an outpatient procedure, please make arrangements for a responsible adult to be with you for 24 hours after your surgery. Special Instructions:      Bring list of CURRENT medications. Bring any pertinent legal medical records. Take these medications the morning of surgery with a sip of water:  as instructed  by surgeon   Complete bowel prep per MD instructions.

## 2022-07-26 NOTE — TELEPHONE ENCOUNTER
Ms Evie Armstrong stated she spoke with Dr Christelle Conte regarding lab work. Did not see any lab orders in the patient's chart.  Please follow up with Ms Evie Armstrong to discuss this matter when available

## 2022-07-27 ENCOUNTER — HOSPITAL ENCOUNTER (OUTPATIENT)
Age: 47
Setting detail: OUTPATIENT SURGERY
Discharge: HOME | End: 2022-07-27
Attending: COLON & RECTAL SURGERY | Admitting: COLON & RECTAL SURGERY
Payer: MEDICAID

## 2022-07-27 ENCOUNTER — ANESTHESIA (OUTPATIENT)
Dept: ENDOSCOPY | Age: 47
End: 2022-07-27
Payer: MEDICAID

## 2022-07-27 VITALS
BODY MASS INDEX: 48.32 KG/M2 | HEART RATE: 67 BPM | SYSTOLIC BLOOD PRESSURE: 125 MMHG | RESPIRATION RATE: 14 BRPM | DIASTOLIC BLOOD PRESSURE: 82 MMHG | TEMPERATURE: 98.5 F | OXYGEN SATURATION: 100 % | HEIGHT: 64 IN | WEIGHT: 283 LBS

## 2022-07-27 LAB — HCG UR QL: NEGATIVE

## 2022-07-27 PROCEDURE — 76060000032 HC ANESTHESIA 0.5 TO 1 HR: Performed by: COLON & RECTAL SURGERY

## 2022-07-27 PROCEDURE — 74011250637 HC RX REV CODE- 250/637

## 2022-07-27 PROCEDURE — C1729 CATH, DRAINAGE: HCPCS | Performed by: COLON & RECTAL SURGERY

## 2022-07-27 PROCEDURE — 77030008565 HC TBNG SUC IRR ERBE -B: Performed by: COLON & RECTAL SURGERY

## 2022-07-27 PROCEDURE — 74011000250 HC RX REV CODE- 250: Performed by: NURSE ANESTHETIST, CERTIFIED REGISTERED

## 2022-07-27 PROCEDURE — 2709999900 HC NON-CHARGEABLE SUPPLY: Performed by: COLON & RECTAL SURGERY

## 2022-07-27 PROCEDURE — 76040000007: Performed by: COLON & RECTAL SURGERY

## 2022-07-27 PROCEDURE — 77030021593 HC FCPS BIOP ENDOSC BSC -A: Performed by: COLON & RECTAL SURGERY

## 2022-07-27 PROCEDURE — 88305 TISSUE EXAM BY PATHOLOGIST: CPT

## 2022-07-27 PROCEDURE — 45380 COLONOSCOPY AND BIOPSY: CPT | Performed by: COLON & RECTAL SURGERY

## 2022-07-27 PROCEDURE — 00812 ANES LWR INTST SCR COLSC: CPT | Performed by: ANESTHESIOLOGY

## 2022-07-27 PROCEDURE — 81025 URINE PREGNANCY TEST: CPT

## 2022-07-27 PROCEDURE — 74011250636 HC RX REV CODE- 250/636: Performed by: NURSE ANESTHETIST, CERTIFIED REGISTERED

## 2022-07-27 PROCEDURE — 00812 ANES LWR INTST SCR COLSC: CPT | Performed by: NURSE ANESTHETIST, CERTIFIED REGISTERED

## 2022-07-27 RX ORDER — SODIUM CHLORIDE 0.9 % (FLUSH) 0.9 %
5-40 SYRINGE (ML) INJECTION EVERY 8 HOURS
Status: DISCONTINUED | OUTPATIENT
Start: 2022-07-27 | End: 2022-07-27 | Stop reason: HOSPADM

## 2022-07-27 RX ORDER — LIDOCAINE HYDROCHLORIDE 20 MG/ML
INJECTION, SOLUTION EPIDURAL; INFILTRATION; INTRACAUDAL; PERINEURAL AS NEEDED
Status: DISCONTINUED | OUTPATIENT
Start: 2022-07-27 | End: 2022-07-27 | Stop reason: HOSPADM

## 2022-07-27 RX ORDER — SODIUM CHLORIDE, SODIUM LACTATE, POTASSIUM CHLORIDE, CALCIUM CHLORIDE 600; 310; 30; 20 MG/100ML; MG/100ML; MG/100ML; MG/100ML
75 INJECTION, SOLUTION INTRAVENOUS CONTINUOUS
Status: DISCONTINUED | OUTPATIENT
Start: 2022-07-27 | End: 2022-07-27 | Stop reason: HOSPADM

## 2022-07-27 RX ORDER — FAMOTIDINE 20 MG/1
TABLET, FILM COATED ORAL
Status: COMPLETED
Start: 2022-07-27 | End: 2022-07-27

## 2022-07-27 RX ORDER — PROPOFOL 10 MG/ML
INJECTION, EMULSION INTRAVENOUS AS NEEDED
Status: DISCONTINUED | OUTPATIENT
Start: 2022-07-27 | End: 2022-07-27 | Stop reason: HOSPADM

## 2022-07-27 RX ORDER — SODIUM CHLORIDE 0.9 % (FLUSH) 0.9 %
5-40 SYRINGE (ML) INJECTION AS NEEDED
Status: DISCONTINUED | OUTPATIENT
Start: 2022-07-27 | End: 2022-07-27 | Stop reason: HOSPADM

## 2022-07-27 RX ORDER — ONDANSETRON 2 MG/ML
4 INJECTION INTRAMUSCULAR; INTRAVENOUS AS NEEDED
Status: DISCONTINUED | OUTPATIENT
Start: 2022-07-27 | End: 2022-07-27 | Stop reason: HOSPADM

## 2022-07-27 RX ORDER — INSULIN LISPRO 100 [IU]/ML
INJECTION, SOLUTION INTRAVENOUS; SUBCUTANEOUS ONCE
Status: DISCONTINUED | OUTPATIENT
Start: 2022-07-27 | End: 2022-07-27 | Stop reason: HOSPADM

## 2022-07-27 RX ORDER — SODIUM CHLORIDE, SODIUM LACTATE, POTASSIUM CHLORIDE, CALCIUM CHLORIDE 600; 310; 30; 20 MG/100ML; MG/100ML; MG/100ML; MG/100ML
50 INJECTION, SOLUTION INTRAVENOUS CONTINUOUS
Status: DISCONTINUED | OUTPATIENT
Start: 2022-07-27 | End: 2022-07-27 | Stop reason: HOSPADM

## 2022-07-27 RX ORDER — FAMOTIDINE 20 MG/1
20 TABLET, FILM COATED ORAL ONCE
Status: COMPLETED | OUTPATIENT
Start: 2022-07-27 | End: 2022-07-27

## 2022-07-27 RX ADMIN — PROPOFOL 30 MG: 10 INJECTION, EMULSION INTRAVENOUS at 13:32

## 2022-07-27 RX ADMIN — PROPOFOL 50 MG: 10 INJECTION, EMULSION INTRAVENOUS at 13:27

## 2022-07-27 RX ADMIN — SODIUM CHLORIDE, POTASSIUM CHLORIDE, SODIUM LACTATE AND CALCIUM CHLORIDE 50 ML/HR: 600; 310; 30; 20 INJECTION, SOLUTION INTRAVENOUS at 12:18

## 2022-07-27 RX ADMIN — PROPOFOL 30 MG: 10 INJECTION, EMULSION INTRAVENOUS at 13:30

## 2022-07-27 RX ADMIN — FAMOTIDINE 20 MG: 20 TABLET ORAL at 11:59

## 2022-07-27 RX ADMIN — PROPOFOL 20 MG: 10 INJECTION, EMULSION INTRAVENOUS at 13:36

## 2022-07-27 RX ADMIN — FAMOTIDINE 20 MG: 20 TABLET, FILM COATED ORAL at 11:59

## 2022-07-27 RX ADMIN — PROPOFOL 10 MG: 10 INJECTION, EMULSION INTRAVENOUS at 13:38

## 2022-07-27 RX ADMIN — PROPOFOL 30 MG: 10 INJECTION, EMULSION INTRAVENOUS at 13:34

## 2022-07-27 RX ADMIN — PROPOFOL 30 MG: 10 INJECTION, EMULSION INTRAVENOUS at 13:28

## 2022-07-27 RX ADMIN — LIDOCAINE HYDROCHLORIDE 40 MG: 20 INJECTION, SOLUTION EPIDURAL; INFILTRATION; INTRACAUDAL; PERINEURAL at 13:27

## 2022-07-27 RX ADMIN — PROPOFOL 10 MG: 10 INJECTION, EMULSION INTRAVENOUS at 13:40

## 2022-07-27 NOTE — OP NOTES
[facility]  [address], [address]   [place], [address]  [phone]                    Colonoscopy Procedure Note      Eber Persaud  1975  [id number]                Date of Procedure: 7/27/2022    Preoperative diagnosis: Colon cancer Screening:  Z12.11    Postoperative diagnosis: recto-sigmoid polyp    :  Avani Gates MD    Assistant(s): Endoscopy Technician-1: Rufina Barger  Float Staff: Stas Salter RN    Sedation: MAC    Complications: None    Implants: None    Procedure Details:  Prior to the procedure, a history and physical were performed. The patients medications, allergies and sensitivities were reviewed and all questions were answered. After informed consent was obtained for the procedure, with all risks and benefits of procedure explained. The patient was taken to the endoscopy suite and placed in the left lateral decubitus position. Patient identification and proposed procedure were verified prior to the procedure by the nurse and I. After sequential anesthesia administered by anesthesiologist, a digital rectal exam was performed and was normal.  The Olympus video colonoscope was introduced through the anus and advanced to cecum, which was identified by the ileocecal valve and appendiceal orifice. The quality of preparation was good. The colonoscope was slowly withdrawn and the mucosa examined for any abnormalities. Cecal withdrawal time was greater than 6 minutes. The patient tolerated the procedure well. There were no complications. Findings/Interventions:   Polyps - #1, 3 mm in size, located in the rectosigmoid, removed by cold biopsy and sent for pathology, - #2, 3 mm in size, located in the rectosigmoid, removed by cold biopsy and sent for pathology    EBL: none    Recommendations: -Repeat colonoscopy in 5 years.    NO aspirin for 5 days     Discharge Disposition:  Estella Hunter MD  7/27/2022  1:51 PM

## 2022-07-27 NOTE — DISCHARGE INSTRUCTIONS
No aspirin or ibuprofen (e.g. Aleve, Motrin, Advil) for 5 days. Repeat colonoscopy in 5 year(s). Colonoscopy: What to Expect at [facility]  After a colonoscopy, you'll stay at the clinic until you wake up. Then you can go home. But you'll need to arrange for a ride. Your doctor will tell you when you can eat and do your other usual activities. Your doctor will talk to you about when you'll need your next colonoscopy. Your doctor can help you decide how often you need to be checked. This will depend on the results of your test and your risk for colorectal cancer. After the test, you may be bloated or have gas pains. You may need to pass gas. If a biopsy was done or a polyp was removed, you may have streaks of blood in your stool (feces) for a few days. Problems such as heavy rectal bleeding may not occur until several weeks after the test. This isn't common. But it can happen after polyps are removed. This care sheet gives you a general idea about how long it will take for you to recover. But each person recovers at a different pace. Follow the steps below to get better as quickly as possible. How can you care for yourself at home? Activity    Rest when you feel tired. You can do your normal activities when it feels okay to do so. Diet    Follow your doctor's directions for eating. Unless your doctor has told you not to, drink plenty of fluids. This helps to replace the fluids that were lost during the colon prep. Do not drink alcohol. Medicines    Your doctor will tell you if and when you can restart your medicines. You will also be given instructions about taking any new medicines. If you take aspirin or some other blood thinner, ask your doctor if and when to start taking it again. Make sure that you understand exactly what your doctor wants you to do.      If polyps were removed or a biopsy was done during the test, your doctor may tell you not to take aspirin or other anti-inflammatory medicines for a few days. These include ibuprofen (Advil, Motrin) and naproxen (Aleve). Other instructions    For your safety, do not drive or operate machinery until the medicine wears off and you can think clearly. Your doctor may tell you not to drive or operate machinery until the day after your test.     Do not sign legal documents or make major decisions until the medicine wears off and you can think clearly. The anesthesia can make it hard for you to fully understand what you are agreeing to. Follow-up care is a key part of your treatment and safety. Be sure to make and go to all appointments, and call your doctor if you are having problems. It's also a good idea to know your test results and keep a list of the medicines you take. When should you call for help? Call 911 anytime you think you may need emergency care. For example, call if:    You passed out (lost consciousness). You pass maroon or bloody stools. You have trouble breathing. Call your doctor now or seek immediate medical care if:    You have pain that does not get better after you take pain medicine. You are sick to your stomach or cannot drink fluids. You have new or worse belly pain. You have blood in your stools. You have a fever. You cannot pass stools or gas. Watch closely for changes in your health, and be sure to contact your doctor if you have any problems. Where can you learn more? Go to http://www.gray.com/  Enter E264 in the search box to learn more about \"Colonoscopy: What to Expect at Home. \"  Current as of: September 8, 2021               Content Version: 13.2  © 2006-2022 [employer]. Care instructions adapted under license by [employer] (which disclaims liability or warranty for this information).  If you have questions about a medical condition or this instruction, always ask your healthcare professional. [employer] disclaims any warranty or liability for your use of this information. DISCHARGE SUMMARY from Nurse     POST-PROCEDURE INSTRUCTIONS:    Call your Physician if you:  Observe any excess bleeding. Develop a temperature over 100.5o F. Experience abdominal, shoulder or chest pain. Notice any signs of decreased circulation or nerve impairment to an extremity such as a change in color, persistent numbness, tingling, coldness or increase in pain. Vomit blood or you have nausea and vomiting lasting longer than 4 hours. Are unable to take medications. Are unable to urinate within 8 hours after discharge following general anesthesia or intravenous sedation. For the next 24 hours after receiving general anesthesia or intravenous sedation, or while taking prescription Narcotics, limit your activities:  Do NOT drive a motor vehicle, operate hazard machinery or power tools, or perform tasks that require coordination. The medication you received during your procedure may have some effect on your mental awareness. Do NOT make important personal or business decisions. The medication you received during your procedure may have some effect on your mental awareness. Do NOT drink alcoholic beverages. These drinks do not mix well with the medications that have been given to you. Upon discharge from the hospital, you must be accompanied by a responsible adult. Resume your diet as directed by your physician. Resume medications as your physician has prescribed. Please give a list of your current medications to your Primary Care Provider. Please update this list whenever your medications are discontinued, doses are changed, or new medications (including over-the-counter products) are added. Please carry medication information at all times in case of emergency situations.           These are general instructions for a healthy lifestyle:    No smoking/ No tobacco products/ Avoid exposure to second hand smoke.  Surgeon General's Warning:  Quitting smoking now greatly reduces serious risk to your health. Obesity, smoking, and a sedentary lifestyle greatly increase your risk for illness. A healthy diet, regular physical exercise & weight monitoring are important for maintaining a healthy lifestyle  You may be retaining fluid if you have a history of heart failure or if you experience any of the following symptoms:  Weight gain of 3 pounds or more overnight or 5 pounds in a week, increased swelling in our hands or feet or shortness of breath while lying flat in bed. Please call your doctor as soon as you notice any of these symptoms; do not wait until your next office visit. Recognize signs and symptoms of STROKE:  F  -  Face looks uneven  A  -  Arms unable to move or move unevenly  S  -  Speech slurred or non-existent  T  -  Time to call 911 - as soon as signs and symptoms begin - DO NOT go back to bed or wait to see If you get better - TIME IS BRAIN. Colorectal Screening  Colorectal cancer almost always develops from precancerous polyps (abnormal growths) in the colon or rectum. Screening tests can find precancerous polyps, so that they can be removed before they turn into cancer. Screening tests can also find colorectal cancer early, when treatment works best.  Speak with your physician about when you should begin screening and how often you should be tested. [employer] Activation    Thank you for requesting access to [employer]. Please follow the instructions below to securely access and download your online medical record. [employer] allows you to send messages to your doctor, view your test results, renew your prescriptions, schedule appointments, and more. How Do I Sign Up? In your internet browser, go to [email or web address]. Click on the First Time User? Click Here link in the Sign In box. You will see the New Member Sign Up page.   Enter your [employer] Access Code exactly as it appears below. You will not need to use this code after youve completed the sign-up process. If you do not sign up before the expiration date, you must request a new code. [employer] Access Code: Activation code not generated  Current [employer] Status: Active (This is the date your [employer] access code will )    Enter the last four digits of your Social Security Number (xxxx) and Date of Birth (mm/dd/yyyy) as indicated and click Submit. You will be taken to the next sign-up page. Create a [employer] ID. This will be your [employer] login ID and cannot be changed, so think of one that is secure and easy to remember. Create a [employer] password. You can change your password at any time. Enter your Password Reset Question and Answer. This can be used at a later time if you forget your password. Enter your e-mail address. You will receive e-mail notification when new information is available in [address] Click Sign Up. You can now view and download portions of your medical record. Click the [employer] link to download a portable copy of your medical information. Additional Information    If you have questions, please call [phone]. Remember, [employer] is NOT to be used for urgent needs. For medical emergencies, dial 911. Educational references and/or instructions provided during this visit included:    See Attached      APPOINTMENTS:    Per MD Instruction    Discharge information has been reviewed with the patient. The patient verbalized understanding. Colon Polyps: Care Instructions  Your Care Instructions     Colon polyps are growths in the colon or the rectum. The cause of most colon polyps is not known, and most people who get them do not have any problems. But a certain kind can turn into cancer. For this reason, regular testing for colon polyps is important for people as they get older.  It is also important for anyone who has an increased risk for colon cancer. Polyps are usually found through routine colon cancer screening tests. Although most colon polyps are not cancerous, they are usually removed and then tested for cancer. Screening for colon cancer saves lives because the cancer can usually be cured if it is caught early. If you have a polyp that is the type that can turn into cancer, you may need more tests to examine your entire colon. The doctor will remove any other polyps that he or she finds, and you will be tested more often. Follow-up care is a key part of your treatment and safety. Be sure to make and go to all appointments, and call your doctor if you are having problems. It's also a good idea to know your test results and keep a list of the medicines you take. How can you care for yourself at home? Regular exams to look for colon polyps are the best way to prevent polyps from turning into colon cancer. These can include stool tests, sigmoidoscopy, colonoscopy, and CT colonography. Talk with your doctor about a testing schedule that is right for you. To prevent polyps  There is no home treatment that can prevent colon polyps. But these steps may help lower your risk for cancer. Stay active. Being active can help you get to and stay at a healthy weight. Try to exercise on most days of the week. Walking is a good choice. Eat well. Choose a variety of vegetables, fruits, legumes (such as peas and beans), fish, poultry, and whole grains. Do not smoke. If you need help quitting, talk to your doctor about stop-smoking programs and medicines. These can increase your chances of quitting for good. If you drink alcohol, limit how much you drink. Limit alcohol to 2 drinks a day for men and 1 drink a day for women. When should you call for help? Call your doctor now or seek immediate medical care if:    You have severe belly pain. Your stools are maroon or very bloody.    Watch closely for changes in your health, and be sure to contact your doctor if:    You have a fever. You have nausea or vomiting. You have a change in bowel habits (new constipation or diarrhea). Your symptoms get worse or are not improving as expected. Where can you learn more? Go to http://www.morales.com/  Enter C571 in the search box to learn more about \"Colon Polyps: Care Instructions. \"  Current as of: September 8, 2021               Content Version: 13.2  © 2006-2022 [employer]. Care instructions adapted under license by [employer] (which disclaims liability or warranty for this information). If you have questions about a medical condition or this instruction, always ask your healthcare professional. [address] any warranty or liability for your use of this information.

## 2022-07-27 NOTE — H&P
HPI: Shanelle Stewart is a 40 y.o. female presenting with chief complain of need for crc screening    Past Medical History:   Diagnosis Date    Bipolar 1 disorder ([address]     Borderline diabetes     Genital herpes     Hyperlipidemia     Hypertension        Past Surgical History:   Procedure Laterality Date    HX  SECTION  2011       Family History   Problem Relation Age of Onset    Hypertension Mother     Heart Disease Father     Heart Surgery Father     Hypertension Father     Diabetes Maternal Aunt     Asthma Sister     Diabetes Brother        Social History     Socioeconomic History    Marital status: SINGLE   Tobacco Use    Smoking status: Never    Smokeless tobacco: Never   Vaping Use    Vaping Use: Never used   Substance and Sexual Activity    Alcohol use: No     Alcohol/week: 0.0 standard drinks    Drug use: No    Sexual activity: Yes     Partners: Male     Birth control/protection: None   Other Topics Concern     Service No    Blood Transfusions No    Caffeine Concern Yes     Comment: ocassionally     Sleep Concern No    Stress Concern No    Weight Concern Yes    Special Diet No    Exercise No    Seat Belt Yes    Self-Exams Yes       Review of Systems - neg    Outpatient Medications Marked as Taking for the 22 encounter [facility] Encounter)   Medication Sig Dispense Refill    bisacodyL (Dulcolax, bisacodyl,) 5 mg EC tablet Take tablets as directed for bowel prep. 4 Tablet 0    polyethylene glycol (Miralax) 17 gram/dose powder Take as directed for bowel preparation 238 g 0    hydroCHLOROthiazide (HYDRODIURIL) 25 mg tablet take 1 tablet by mouth once daily 90 Tablet 0    labetaloL (NORMODYNE) 300 mg tablet Take 1 Tablet by mouth two (2) times a day for 90 days. 180 Tablet 1    levocetirizine (XYZAL) 5 mg tablet Take 1 Tablet by mouth daily for 90 days. 90 Tablet 1    atorvastatin (LIPITOR) 10 mg tablet Take 1 Tablet by mouth daily.  90 Tablet 1    fluticasone propionate (FLONASE) 50 mcg/actuation nasal spray 2 Sprays by Both Nostrils route daily. 1 Each 0    MULTIVITAMIN PO Take  by mouth. ARIPiprazole (ABILIFY) 15 mg tablet Take 20 mg by mouth daily. No Known Allergies    Vitals:    07/26/22 1143 07/27/22 1130   BP:  103/65   Pulse:  74   Resp:  18   Temp:  98 °F (36.7 °C)   SpO2:  98%   Weight: 132.5 kg (292 lb) 128.4 kg (283 lb)   Height: 5' 4\" (1.626 m) 5' 4\" (1.626 m)   LMP: 07/09/2022       Physical Exam  Constitutional:       Appearance: She is well-developed. HENT:      Head: Normocephalic and atraumatic. Eyes:      Conjunctiva/sclera: Conjunctivae normal.   Abdominal:      General: There is no distension. Palpations: Abdomen is soft. Tenderness: There is no abdominal tenderness. Musculoskeletal:         General: Normal range of motion. Lymphadenopathy:      Cervical: No cervical adenopathy. Skin:     General: Skin is warm and dry. Findings: No rash. Neurological:      Sensory: No sensory deficit. Psychiatric:         Speech: Speech normal.       Assessment / Plan    colonoscopy    The diagnoses and plan were discussed with the patient. All questions answered. Plan of care agreed to by all concerned.

## 2022-07-27 NOTE — ANESTHESIA POSTPROCEDURE EVALUATION
Procedure(s):  COLONOSCOPY/ Polypectomy.     MAC    Anesthesia Post Evaluation      Multimodal analgesia: multimodal analgesia used between 6 hours prior to anesthesia start to PACU discharge  Patient location during evaluation: bedside  Patient participation: complete - patient participated  Level of consciousness: awake  Pain management: adequate  Airway patency: patent  Anesthetic complications: no  Cardiovascular status: stable  Respiratory status: acceptable  Hydration status: acceptable  Post anesthesia nausea and vomiting:  controlled      INITIAL Post-op Vital signs:   Vitals Value Taken Time   /82 07/27/22 1422   Temp 36.9 °C (98.5 °F) 07/27/22 1351   Pulse 67 07/27/22 1422   Resp 14 07/27/22 1422   SpO2 100 % 07/27/22 1422

## 2022-07-27 NOTE — ANESTHESIA PREPROCEDURE EVALUATION
Relevant Problems   NEUROLOGY   (+) Bipolar 1 disorder (HCC)      ENDOCRINE   (+) Obesity, morbid (HCC)      HEMATOLOGY   (+) Antepartum anemia       Anesthetic History   No history of anesthetic complications            Review of Systems / Medical History  Patient summary reviewed and pertinent labs reviewed    Pulmonary  Within defined limits                 Neuro/Psych   Within defined limits           Cardiovascular    Hypertension                   GI/Hepatic/Renal  Within defined limits              Endo/Other        Morbid obesity     Other Findings              Physical Exam    Airway  Mallampati: II  TM Distance: 4 - 6 cm  Neck ROM: normal range of motion   Mouth opening: Normal     Cardiovascular  Regular rate and rhythm,  S1 and S2 normal,  no murmur, click, rub, or gallop             Dental  No notable dental hx       Pulmonary  Breath sounds clear to auscultation               Abdominal  GI exam deferred       Other Findings            Anesthetic Plan    ASA: 2  Anesthesia type: MAC          Induction: Intravenous  Anesthetic plan and risks discussed with: Patient

## 2022-08-02 ENCOUNTER — OFFICE VISIT (OUTPATIENT)
Dept: FAMILY MEDICINE CLINIC | Age: 47
End: 2022-08-02
Payer: MEDICAID

## 2022-08-02 VITALS
RESPIRATION RATE: 20 BRPM | HEIGHT: 64 IN | BODY MASS INDEX: 49.51 KG/M2 | OXYGEN SATURATION: 97 % | SYSTOLIC BLOOD PRESSURE: 116 MMHG | TEMPERATURE: 98.2 F | WEIGHT: 290 LBS | DIASTOLIC BLOOD PRESSURE: 70 MMHG | HEART RATE: 79 BPM

## 2022-08-02 DIAGNOSIS — I10 ESSENTIAL HYPERTENSION: ICD-10-CM

## 2022-08-02 DIAGNOSIS — N89.8 VAGINAL DISCHARGE: Primary | ICD-10-CM

## 2022-08-02 DIAGNOSIS — E66.01 OBESITY, MORBID (HCC): ICD-10-CM

## 2022-08-02 DIAGNOSIS — E78.5 HYPERLIPIDEMIA, UNSPECIFIED HYPERLIPIDEMIA TYPE: ICD-10-CM

## 2022-08-02 DIAGNOSIS — T78.40XA ALLERGY, INITIAL ENCOUNTER: ICD-10-CM

## 2022-08-02 DIAGNOSIS — B37.31 VAGINAL YEAST INFECTION: ICD-10-CM

## 2022-08-02 DIAGNOSIS — F31.9 BIPOLAR 1 DISORDER (HCC): ICD-10-CM

## 2022-08-02 PROCEDURE — 99214 OFFICE O/P EST MOD 30 MIN: CPT | Performed by: FAMILY MEDICINE

## 2022-08-02 RX ORDER — LORATADINE 10 MG/1
10 TABLET ORAL
Qty: 90 TABLET | Refills: 0 | Status: SHIPPED | OUTPATIENT
Start: 2022-08-02

## 2022-08-02 RX ORDER — ATORVASTATIN CALCIUM 10 MG/1
10 TABLET, FILM COATED ORAL DAILY
Qty: 90 TABLET | Refills: 1 | Status: SHIPPED | OUTPATIENT
Start: 2022-08-02 | End: 2022-09-08 | Stop reason: SDUPTHER

## 2022-08-02 RX ORDER — FLUCONAZOLE 150 MG/1
150 TABLET ORAL DAILY
Qty: 1 TABLET | Refills: 0 | Status: SHIPPED | OUTPATIENT
Start: 2022-08-02 | End: 2022-08-03

## 2022-08-02 NOTE — PROGRESS NOTES
1. \"Have you been to the ER, urgent care clinic since your last visit? Hospitalized since your last visit? \" No    2. \"Have you seen or consulted any other health care providers outside of the [address] [place] [address] since your last visit? \" No     3. For patients aged 39-70: Has the patient had a colonoscopy / FIT/ Cologuard? Yes - no Care Gap present      If the patient is female:    4. For patients aged 41-77: Has the patient had a mammogram within the past 2 years? Yes - no Care Gap present      5. For patients aged 21-65: Has the patient had a pap smear?  No

## 2022-08-02 NOTE — PROGRESS NOTES
HPI  Lola Price comes in for follow-up care. Vaginal discharge: Patient has vaginal discharge. States that it is itchy and seems like yeast infection. She will get a vaginal swab. We will send this for microscopy. Patient would like to get some Diflucan for her vaginal yeast.  I will send in a prescription of this. Bipolar disorder: Patient has bipolar disorder. She is followed up by the behavioral health specialist.  She is on Abilify. She will continue with the medication. Allergy: Patient has allergy with nasal congestion, teary eyes and sneezing. She has been on Xyzal.  This has not helped much. She would like to try different medication. I will send in Claritin. Dyslipidemia: Patient has dyslipidemia. She is on Lipitor. She will exercise and take a diet low in polysaturated fats. We will continue current treatment plan. Hypertension: Patient has hypertension. She is on labetalol and HCTZ. Denies headache, changes in vision or focal weakness. Continue current treatment plan. Morbid obesity: Patient has a BMI of 49.78. She will intensify lifestyle and dietary modification. Past Medical History  Past Medical History:   Diagnosis Date    Bipolar 1 disorder ([facility] [address]     Borderline diabetes     Genital herpes     Hyperlipidemia     Hypertension        Surgical History  Past Surgical History:   Procedure Laterality Date    COLONOSCOPY N/A 7/27/2022    COLONOSCOPY/ Polypectomy performed by Alfreda Salomon MD at [facility]        Medications  Current Outpatient Medications   Medication Sig Dispense Refill    atorvastatin (LIPITOR) 10 mg tablet Take 1 Tablet by mouth in the morning. 90 Tablet 1    fluconazole (DIFLUCAN) 150 mg tablet Take 1 Tablet by mouth in the morning for 1 day. FDA advises cautious prescribing of oral fluconazole in pregnancy.  1 Tablet 0    hydroCHLOROthiazide (HYDRODIURIL) 25 mg tablet take 1 tablet by mouth once daily 90 Tablet 0 labetaloL (NORMODYNE) 300 mg tablet Take 1 Tablet by mouth two (2) times a day for 90 days. 180 Tablet 1    levocetirizine (XYZAL) 5 mg tablet Take 1 Tablet by mouth daily for 90 days. 90 Tablet 1    fluticasone propionate (FLONASE) 50 mcg/actuation nasal spray 2 Sprays by Both Nostrils route daily. 1 Each 0    MULTIVITAMIN PO Take  by mouth. ARIPiprazole (ABILIFY) 15 mg tablet Take 20 mg by mouth daily. bisacodyL (Dulcolax, bisacodyl,) 5 mg EC tablet Take tablets as directed for bowel prep.  (Patient not taking: Reported on 8/2/2022) 4 Tablet 0    polyethylene glycol (Miralax) 17 gram/dose powder Take as directed for bowel preparation (Patient not taking: Reported on 8/2/2022) 238 g 0       Allergies  No Known Allergies    Family History  Family History   Problem Relation Age of Onset    Hypertension Mother     Heart Disease Father     Heart Surgery Father     Hypertension Father     Diabetes Maternal Aunt     Asthma Sister     Diabetes Brother        Social History  Social History     Socioeconomic History    Marital status: SINGLE     Spouse name: Not on file    Number of children: Not on file    Years of education: Not on file    Highest education level: Not on file   Occupational History    Not on file   Tobacco Use    Smoking status: Never    Smokeless tobacco: Never   Vaping Use    Vaping Use: Never used   Substance and Sexual Activity    Alcohol use: No     Alcohol/week: 0.0 standard drinks    Drug use: No    Sexual activity: Yes     Partners: Male     Birth control/protection: None   Other Topics Concern     Service No    Blood Transfusions No    Caffeine Concern Yes     Comment: ocassionally     Occupational Exposure Not Asked    Hobby Hazards Not Asked    Sleep Concern No    Stress Concern No    Weight Concern Yes    Special Diet No    Back Care Not Asked    Exercise No    Bike Helmet Not Asked    Seat Belt Yes    Self-Exams Yes   Social History Narrative    Not on file     Social Determinants of Health     Financial Resource Strain: Not on file   Food Insecurity: Not on file   Transportation Needs: Not on file   Physical Activity: Not on file   Stress: Not on file   Social Connections: Not on file   Intimate Partner Violence: Not on file   Housing Stability: Not on file       Review of Systems  Review of Systems - Review of all systems is negative except as noted above in the HPI. Vital Signs  Visit Vitals  /70 (BP 1 Location: Left upper arm, BP Patient Position: Sitting, BP Cuff Size: Large adult long)   Pulse 79   Temp 98.2 °F (36.8 °C) (Temporal)   Resp 20   Ht 5' 4\" (1.626 m)   Wt 290 lb (131.5 kg)   LMP 07/09/2022 (Approximate)   SpO2 97%   BMI 49.78 kg/m²         Physical Exam  Physical Examination: General appearance - alert, well appearing, and in no distress, oriented to person, place, and time, and overweight  Mental status - affect appropriate to mood  Lymphatics - no palpable lymphadenopathy, no hepatosplenomegaly  Chest - clear to auscultation, no wheezes, rales or rhonchi, symmetric air entry  Heart - normal rate, regular rhythm, normal S1, S2, no murmurs, rubs, clicks or gallops  Abdomen - soft, nontender, nondistended, no masses or organomegaly  Back exam - limited range of motion  Neurological - normal muscle tone, no tremors, strength 5/5  Musculoskeletal - osteoarthritic changes noted in both hands  Extremities - no pedal edema noted, intact peripheral pulses      Results  Results for orders placed or performed during the hospital encounter of 07/27/22   HCG URINE, QL. - POC   Result Value Ref Range    Pregnancy test,urine (POC) Negative NEG         ASSESSMENT and PLAN    ICD-10-CM ICD-9-CM    1. Vaginal discharge  N89.8 623.5 NUSWAB VAGINITIS + HSV      2. Obesity, morbid ([facility] [address]  E66.01 278.01       3. Hyperlipidemia, unspecified hyperlipidemia type  E78.5 272.4 atorvastatin (LIPITOR) 10 mg tablet      4. Bipolar 1 disorder (HCC)  F31.9 296.7       5.  Vaginal yeast infection  B37.3 112.1 fluconazole (DIFLUCAN) 150 mg tablet      6. Allergy, initial encounter  T78.40XA 995.3 loratadine (CLARITIN) 10 mg tablet      7. Essential hypertension  I10 401.9         lab results and schedule of future lab studies reviewed with patient  reviewed diet, exercise and weight control  cardiovascular risk and specific lipid/LDL goals reviewed  reviewed medications and side effects in detail      I have discussed the diagnosis with the patient and the intended plan of care as seen in the above orders. The patient has received an after-visit summary and questions were answered concerning future plans. I have discussed medication, side effects, and warnings with the patient in detail. The patient verbalized understanding and is in agreement with the plan of care. The patient will contact the office with any additional concerns. Guanako Dee MD    PLEASE NOTE:   This document has been produced using voice recognition software.  Unrecognized errors in transcription may be present

## 2022-08-04 ENCOUNTER — TELEPHONE (OUTPATIENT)
Dept: FAMILY MEDICINE CLINIC | Age: 47
End: 2022-08-04

## 2022-08-04 LAB
BACTERIAL VAGINOSIS, NAA: POSITIVE
CANDIDA GLABRATA, NAA, 180057: NEGATIVE
CANDIDA SPECIES, NAA: POSITIVE
CHLAMYDIA TRACHOMATIS, NAA, 180097: NEGATIVE
HERPES 1 (THINPREP / APTIMA SWAB): NEGATIVE
HERPES 2 (THINPREP / APTIMA SWAB): NEGATIVE
NEISSERIA GONORRHOEAE, NAA, 180104: NEGATIVE
TRICH VAG BY NAA, 180087: NEGATIVE

## 2022-08-04 RX ORDER — METRONIDAZOLE 500 MG/1
500 TABLET ORAL 2 TIMES DAILY
Qty: 14 TABLET | Refills: 0 | Status: SHIPPED | OUTPATIENT
Start: 2022-08-04 | End: 2022-08-11

## 2022-08-09 ENCOUNTER — TELEPHONE (OUTPATIENT)
Dept: SURGERY | Age: 47
End: 2022-08-09

## 2022-08-09 NOTE — TELEPHONE ENCOUNTER
----- Message from Hardeep Salter MD sent at 8/1/2022  8:36 AM EDT -----  Benign polyp(s). Repeat colonoscopy in 5 year(s) as planned. Notified patient of pathology results. Tickler placed in recall for 5 years. Patient understands.

## 2022-09-06 ENCOUNTER — TELEPHONE (OUTPATIENT)
Dept: FAMILY MEDICINE CLINIC | Age: 47
End: 2022-09-06

## 2022-09-06 NOTE — TELEPHONE ENCOUNTER
Pt went to urgent care for sinus infection, and they prescribed her amoxicillin. Pt stated she now has a yeast infection, and she wants to know can Dr. Latia Lama call her in a medication for it. Please advise.  Thank you!!!

## 2022-09-07 RX ORDER — FLUCONAZOLE 150 MG/1
150 TABLET ORAL DAILY
Qty: 1 TABLET | Refills: 0 | Status: SHIPPED | OUTPATIENT
Start: 2022-09-07 | End: 2022-09-08

## 2022-09-08 ENCOUNTER — OFFICE VISIT (OUTPATIENT)
Dept: FAMILY MEDICINE CLINIC | Age: 47
End: 2022-09-08
Payer: MEDICAID

## 2022-09-08 VITALS
BODY MASS INDEX: 48.65 KG/M2 | WEIGHT: 285 LBS | SYSTOLIC BLOOD PRESSURE: 125 MMHG | HEIGHT: 64 IN | HEART RATE: 82 BPM | DIASTOLIC BLOOD PRESSURE: 87 MMHG | OXYGEN SATURATION: 96 % | RESPIRATION RATE: 20 BRPM | TEMPERATURE: 98.2 F

## 2022-09-08 DIAGNOSIS — H10.13 ALLERGIC CONJUNCTIVITIS OF BOTH EYES: ICD-10-CM

## 2022-09-08 DIAGNOSIS — E66.01 OBESITY, MORBID (HCC): ICD-10-CM

## 2022-09-08 DIAGNOSIS — E78.5 HYPERLIPIDEMIA, UNSPECIFIED HYPERLIPIDEMIA TYPE: ICD-10-CM

## 2022-09-08 DIAGNOSIS — B37.31 VAGINAL YEAST INFECTION: Primary | ICD-10-CM

## 2022-09-08 DIAGNOSIS — F31.9 BIPOLAR 1 DISORDER (HCC): ICD-10-CM

## 2022-09-08 DIAGNOSIS — I10 ESSENTIAL HYPERTENSION: ICD-10-CM

## 2022-09-08 PROCEDURE — 99214 OFFICE O/P EST MOD 30 MIN: CPT | Performed by: FAMILY MEDICINE

## 2022-09-08 RX ORDER — ATORVASTATIN CALCIUM 10 MG/1
10 TABLET, FILM COATED ORAL DAILY
Qty: 90 TABLET | Refills: 1 | Status: SHIPPED | OUTPATIENT
Start: 2022-09-08

## 2022-09-08 NOTE — PROGRESS NOTES
1. \"Have you been to the ER, urgent care clinic since your last visit? Hospitalized since your last visit? \" No    2. \"Have you seen or consulted any other health care providers outside of the [address] [place] [address] since your last visit? \" No     3. For patients aged 39-70: Has the patient had a colonoscopy / FIT/ Cologuard? Yes - no Care Gap present      If the patient is female:    4. For patients aged 41-77: Has the patient had a mammogram within the past 2 years? Yes - no Care Gap present      5. For patients aged 21-65: Has the patient had a pap smear?  Yes - no Care Gap present

## 2022-09-08 NOTE — PROGRESS NOTES
IVIS  Grayland Mcburney comes in for follow-up care. Vaginal discharge: Patient has vaginal discharge and irritation. She wonders about yeast infection and would like to be tested. She had BV last month. She is noted on the vaginal swab. We will recheck a vaginal trial.  Bipolar disorder: Patient has bipolar disorder. She is on Abilify. Stable on medication. Hypertension: Patient has hypertension for blood pressure is stable. Denies headache, changes in vision or focal weakness. She is on HCTZ and labetalol. Continue current treatment plan. Allergy: Patient has allergy both eyes with tearing and redness. Has not taken her allergy medication. She is on Claritin. Advised to take this medication. Dyslipidemia: Patient has dyslipidemia. She is on atorvastatin. Would likely refill of medication. She will exercise and take a diet low in polysaturated fats. Prescription excepted. Morbid obesity: Patient has a BMI of 48.92. She will intensify lifestyle and dietary modification. Past Medical History  Past Medical History:   Diagnosis Date    Bipolar 1 disorder ([facility] [address]     Borderline diabetes     Genital herpes     Hyperlipidemia     Hypertension        Surgical History  Past Surgical History:   Procedure Laterality Date    COLONOSCOPY N/A 7/27/2022    COLONOSCOPY/ Polypectomy performed by Mercedez Scott MD at [facility] 39        Medications  Current Outpatient Medications   Medication Sig Dispense Refill    atorvastatin (LIPITOR) 10 mg tablet Take 1 Tablet by mouth daily. 90 Tablet 1    loratadine (CLARITIN) 10 mg tablet Take 1 Tablet by mouth daily as needed for Allergies. 90 Tablet 0    hydroCHLOROthiazide (HYDRODIURIL) 25 mg tablet take 1 tablet by mouth once daily 90 Tablet 0    labetaloL (NORMODYNE) 300 mg tablet Take 1 Tablet by mouth two (2) times a day for 90 days. 180 Tablet 1    levocetirizine (XYZAL) 5 mg tablet Take 1 Tablet by mouth daily for 90 days.  80 Tablet 1    fluticasone propionate (FLONASE) 50 mcg/actuation nasal spray 2 Sprays by Both Nostrils route daily. 1 Each 0    MULTIVITAMIN PO Take  by mouth. ARIPiprazole (ABILIFY) 15 mg tablet Take 20 mg by mouth daily. fluconazole (DIFLUCAN) 150 mg tablet Take 1 Tablet by mouth daily for 1 day. FDA advises cautious prescribing of oral fluconazole in pregnancy.  1 Tablet 0       Allergies  No Known Allergies    Family History  Family History   Problem Relation Age of Onset    Hypertension Mother     Heart Disease Father     Heart Surgery Father     Hypertension Father     Diabetes Maternal Aunt     Asthma Sister     Diabetes Brother        Social History  Social History     Socioeconomic History    Marital status: SINGLE     Spouse name: Not on file    Number of children: Not on file    Years of education: Not on file    Highest education level: Not on file   Occupational History    Not on file   Tobacco Use    Smoking status: Never    Smokeless tobacco: Never   Vaping Use    Vaping Use: Never used   Substance and Sexual Activity    Alcohol use: No     Alcohol/week: 0.0 standard drinks    Drug use: No    Sexual activity: Yes     Partners: Male     Birth control/protection: None   Other Topics Concern     Service No    Blood Transfusions No    Caffeine Concern Yes     Comment: ocassionally     Occupational Exposure Not Asked    Hobby Hazards Not Asked    Sleep Concern No    Stress Concern No    Weight Concern Yes    Special Diet No    Back Care Not Asked    Exercise No    Bike Helmet Not Asked    Seat Belt Yes    Self-Exams Yes   Social History Narrative    Not on file     Social Determinants of Health     Financial Resource Strain: Not on file   Food Insecurity: Not on file   Transportation Needs: Not on file   Physical Activity: Not on file   Stress: Not on file   Social Connections: Not on file   Intimate Partner Violence: Not on file   Housing Stability: Not on file       Review of Systems  Review of Systems - Review of all systems is negative except as noted above in the HPI. Vital Signs  Visit Vitals  /87 (BP 1 Location: Left upper arm, BP Patient Position: Sitting, BP Cuff Size: Large adult)   Pulse 82   Temp 98.2 °F (36.8 °C) (Temporal)   Resp 20   Ht 5' 4\" (1.626 m)   Wt 285 lb (129.3 kg)   LMP 08/21/2022 (Approximate)   SpO2 96%   BMI 48.92 kg/m²         Physical Exam  Physical Examination: General appearance - alert, well appearing, and in no distress, oriented to person, place, and time, and overweight  Mental status - alert, oriented to person, place, and time  Neck - supple, no significant adenopathy  Lymphatics - no palpable lymphadenopathy, no hepatosplenomegaly  Chest - clear to auscultation, no wheezes, rales or rhonchi, symmetric air entry  Heart - normal rate, regular rhythm, normal S1, S2, no murmurs, rubs, clicks or gallops  Abdomen - soft, nontender, nondistended, no masses or organomegaly  Back exam - limited range of motion  Neurological - abnormal neurological exam unchanged from prior examinations  Musculoskeletal - osteoarthritic changes noted in both hands  Extremities - intact peripheral pulses      Results  Results for orders placed or performed in visit on 08/02/22   NUSWAB VAGINITIS + HSV   Result Value Ref Range    BACTERIAL VAGINOSIS, JANE Positive (A) Negative    NASIM SPECIES, JANE Positive (A) Negative    C. glabrata, JANE Negative Negative    C. trachomatis, JANE Negative Negative    N. gonorrhoeae, JANE Negative Negative    T. vaginalis, JANE Negative Negative    HERPES 1 (THINPREP / APTIMA SWAB) Negative Negative    HERPES 2 (THINPREP / APTIMA SWAB) Negative Negative       ASSESSMENT and PLAN    ICD-10-CM ICD-9-CM    1. Vaginal yeast infection  B37.3 112.1 NUSWAB VAGINITIS + HSV      NUSWAB VAGINITIS + HSV      2. Hyperlipidemia, unspecified hyperlipidemia type  E78.5 272.4 atorvastatin (LIPITOR) 10 mg tablet      3.  Allergic conjunctivitis of both eyes  H10.13 372.14       4. Obesity, morbid ([facility] [address]  E66.01 278.01       5. Bipolar 1 disorder (HCC)  F31.9 296.7       6. Essential hypertension  I10 401.9         lab results and schedule of future lab studies reviewed with patient  reviewed diet, exercise and weight control  cardiovascular risk and specific lipid/LDL goals reviewed  reviewed medications and side effects in detail      I have discussed the diagnosis with the patient and the intended plan of care as seen in the above orders. The patient has received an after-visit summary and questions were answered concerning future plans. I have discussed medication, side effects, and warnings with the patient in detail. The patient verbalized understanding and is in agreement with the plan of care. The patient will contact the office with any additional concerns. Sil Mishra MD    PLEASE NOTE:   This document has been produced using voice recognition software.  Unrecognized errors in transcription may be present

## 2022-09-12 ENCOUNTER — TELEPHONE (OUTPATIENT)
Dept: FAMILY MEDICINE CLINIC | Age: 47
End: 2022-09-12

## 2022-09-12 NOTE — TELEPHONE ENCOUNTER
Pt calling to see if her test results are back yet. Ms. Zion Lewis can be reached at [phone]. Please advise.  Thank you!!!

## 2022-09-13 NOTE — TELEPHONE ENCOUNTER
Has this been reviewed yet?   I see the results but did not see any notes or meds called in    Thank you

## 2022-09-13 NOTE — TELEPHONE ENCOUNTER
Pt called again to get her results. Please follow up when available. Advised pt that return calls are usually made 24-48hrs.  Please be advised

## 2022-09-14 RX ORDER — METRONIDAZOLE 500 MG/1
500 TABLET ORAL 2 TIMES DAILY
Qty: 14 TABLET | Refills: 0 | Status: SHIPPED | OUTPATIENT
Start: 2022-09-14 | End: 2022-09-21

## 2022-09-17 ENCOUNTER — HOSPITAL ENCOUNTER (OUTPATIENT)
Dept: LAB | Age: 47
Discharge: HOME | End: 2022-09-17

## 2022-09-17 LAB — SENTARA SPECIMEN COL,SENBCF: NORMAL

## 2022-09-17 PROCEDURE — 99001 SPECIMEN HANDLING PT-LAB: CPT

## 2022-09-18 LAB
A-G RATIO,AGRAT: 1.4 RATIO (ref 1.1–2.6)
ABSOLUTE LYMPHOCYTE COUNT, 10803: 2.5 K/UL (ref 1–4.8)
ALBUMIN SERPL-MCNC: 4.4 G/DL (ref 3.5–5)
ALP SERPL-CCNC: 97 U/L (ref 25–115)
ALT SERPL-CCNC: 9 U/L (ref 5–40)
ANION GAP SERPL CALC-SCNC: 14 MMOL/L (ref 3–15)
AST SERPL W P-5'-P-CCNC: 12 U/L (ref 10–37)
AVG GLU, 10930: 130 MG/DL (ref 91–123)
BASOPHILS # BLD: 0.1 K/UL (ref 0–0.2)
BASOPHILS NFR BLD: 1 % (ref 0–2)
BILIRUB SERPL-MCNC: 0.8 MG/DL (ref 0.2–1.2)
BUN SERPL-MCNC: 14 MG/DL (ref 6–22)
CALCIUM SERPL-MCNC: 9.8 MG/DL (ref 8.4–10.5)
CHLORIDE SERPL-SCNC: 99 MMOL/L (ref 98–110)
CHOLEST SERPL-MCNC: 149 MG/DL (ref 110–200)
CO2 SERPL-SCNC: 26 MMOL/L (ref 20–32)
CREAT SERPL-MCNC: 0.8 MG/DL (ref 0.5–1.2)
EOSINOPHIL # BLD: 0.1 K/UL (ref 0–0.5)
EOSINOPHIL NFR BLD: 2 % (ref 0–6)
ERYTHROCYTE [DISTWIDTH] IN BLOOD BY AUTOMATED COUNT: 17.2 % (ref 10–15.5)
GLOBULIN,GLOB: 3.2 G/DL (ref 2–4)
GLOMERULAR FILTRATION RATE: >60 ML/MIN/1.73 SQ.M.
GLUCOSE SERPL-MCNC: 101 MG/DL (ref 70–99)
GRANULOCYTES,GRANS: 54 % (ref 40–75)
HBA1C MFR BLD HPLC: 6.1 % (ref 4.8–5.6)
HCT VFR BLD AUTO: 33.5 % (ref 35.1–48)
HDLC SERPL-MCNC: 37 MG/DL
HDLC SERPL-MCNC: 4 MG/DL (ref 0–5)
HGB BLD-MCNC: 10.1 G/DL (ref 11.7–16)
LDL/HDL RATIO,LDHD: 2.5
LDLC SERPL CALC-MCNC: 92 MG/DL (ref 50–99)
LYMPHOCYTES, LYMLT: 35 % (ref 20–45)
MCH RBC QN AUTO: 23 PG (ref 26–34)
MCHC RBC AUTO-ENTMCNC: 30 G/DL (ref 31–36)
MCV RBC AUTO: 75 FL (ref 80–99)
MONOCYTES # BLD: 0.6 K/UL (ref 0.1–1)
MONOCYTES NFR BLD: 8 % (ref 3–12)
NEUTROPHILS # BLD AUTO: 3.8 K/UL (ref 1.8–7.7)
NON-HDL CHOLESTEROL, 011976: 112 MG/DL
PLATELET # BLD AUTO: 519 K/UL (ref 140–440)
PMV BLD AUTO: 9.1 FL (ref 9–13)
POTASSIUM SERPL-SCNC: 4.1 MMOL/L (ref 3.5–5.5)
PROT SERPL-MCNC: 7.6 G/DL (ref 6.4–8.3)
RBC # BLD AUTO: 4.48 M/UL (ref 3.8–5.2)
SODIUM SERPL-SCNC: 139 MMOL/L (ref 133–145)
TRIGL SERPL-MCNC: 101 MG/DL (ref 40–149)
VLDLC SERPL CALC-MCNC: 20 MG/DL (ref 8–30)
WBC # BLD AUTO: 7.1 K/UL (ref 4–11)

## 2022-09-19 DIAGNOSIS — D50.9 MICROCYTIC ANEMIA: Primary | ICD-10-CM

## 2022-09-22 NOTE — PROGRESS NOTES
After obtaining identifiers patient was made aware of all lab resul;ts and recommendations.   Referral information was given for patient to make appt to Hematology

## 2022-09-29 RX ORDER — HYDROCHLOROTHIAZIDE 25 MG/1
TABLET ORAL
Qty: 90 TABLET | Refills: 0 | Status: SHIPPED | OUTPATIENT
Start: 2022-09-29

## 2022-10-11 ENCOUNTER — VIRTUAL VISIT (OUTPATIENT)
Dept: FAMILY MEDICINE CLINIC | Age: 47
End: 2022-10-11
Payer: MEDICAID

## 2022-10-11 DIAGNOSIS — N89.8 VAGINAL DISCHARGE: ICD-10-CM

## 2022-10-11 DIAGNOSIS — D50.9 MICROCYTIC ANEMIA: Primary | ICD-10-CM

## 2022-10-11 DIAGNOSIS — R73.03 PREDIABETES: ICD-10-CM

## 2022-10-11 DIAGNOSIS — E66.01 OBESITY, MORBID (HCC): ICD-10-CM

## 2022-10-11 DIAGNOSIS — I10 ESSENTIAL HYPERTENSION: ICD-10-CM

## 2022-10-11 PROCEDURE — 99213 OFFICE O/P EST LOW 20 MIN: CPT | Performed by: FAMILY MEDICINE

## 2022-10-11 RX ORDER — FLUCONAZOLE 150 MG/1
150 TABLET ORAL DAILY
Qty: 1 TABLET | Refills: 0 | Status: SHIPPED | OUTPATIENT
Start: 2022-10-11 | End: 2022-10-12

## 2022-10-11 RX ORDER — LEVOCETIRIZINE DIHYDROCHLORIDE 5 MG/1
5 TABLET, FILM COATED ORAL DAILY
COMMUNITY
Start: 2022-10-04

## 2022-10-11 RX ORDER — METRONIDAZOLE 500 MG/1
500 TABLET ORAL 3 TIMES DAILY
Qty: 21 TABLET | Refills: 0 | Status: SHIPPED | OUTPATIENT
Start: 2022-10-11 | End: 2022-10-18

## 2022-10-31 ENCOUNTER — TELEPHONE (OUTPATIENT)
Dept: FAMILY MEDICINE CLINIC | Age: 47
End: 2022-10-31

## 2022-10-31 NOTE — TELEPHONE ENCOUNTER
Pt stated she is getting over covid, and she would like for Dr. Hugh Huang to prescribe her something for mucus. Pt stated she is coughing up a lot of yellow mucus. Please advise.  Thank you!!!

## 2022-11-01 ENCOUNTER — TELEPHONE (OUTPATIENT)
Dept: FAMILY MEDICINE CLINIC | Age: 47
End: 2022-11-01

## 2022-11-01 RX ORDER — GUAIFENESIN/DEXTROMETHORPHAN 100-10MG/5
5 SYRUP ORAL
Qty: 236 ML | Refills: 0 | Status: SHIPPED | OUTPATIENT
Start: 2022-11-01

## 2022-11-01 NOTE — TELEPHONE ENCOUNTER
Patient called this morning because she was coughing up thick yellow phlegm and was having some shortness of breath earlier in the week. She is now just getting over a Covid quarantine and just wanted to touch bases so you would know what is going on with her. She was headed to work but decided she may go to the E/D because she was also now just noticing some right side neck stiffness.

## 2022-11-10 ENCOUNTER — OFFICE VISIT (OUTPATIENT)
Dept: ONCOLOGY | Age: 47
End: 2022-11-10
Payer: MEDICAID

## 2022-11-10 VITALS
TEMPERATURE: 98.7 F | SYSTOLIC BLOOD PRESSURE: 120 MMHG | OXYGEN SATURATION: 94 % | BODY MASS INDEX: 48.07 KG/M2 | HEART RATE: 91 BPM | HEIGHT: 64 IN | WEIGHT: 281.6 LBS | DIASTOLIC BLOOD PRESSURE: 75 MMHG

## 2022-11-10 DIAGNOSIS — D75.839 THROMBOCYTOSIS: ICD-10-CM

## 2022-11-10 DIAGNOSIS — D50.9 MICROCYTIC ANEMIA: Primary | ICD-10-CM

## 2022-11-10 PROCEDURE — 99204 OFFICE O/P NEW MOD 45 MIN: CPT | Performed by: INTERNAL MEDICINE

## 2022-11-10 NOTE — PROGRESS NOTES
Hematology/Oncology Consultation Note      Date: 11/10/2022    Name: Joli Ahumada  : 1975        Nirmal Hargrove MD         Subjective:     Chief complaint: Microcytic anemia    History of Present Illness:   Ms. Jazmin Martinez is a most pleasant 56y.o. year old female who was seen for consultation of microcytic anemia. The patient has a past medical history significant of hypertension, hyperlipidemia, mood disorders. Lab reviews indicated chronic anemia since at least 2020, hemoglobin 11.8  2022 CBC reported hemoglobin 10.1, hematocrit 33.5%, MCV 75, platelet 355,703, WBC 7.1. Her colonoscopy was done in 2022. The patient reported she has stopped taking iron pills. She experienced some constipation from iron pills. The patient reported low energy levels and on of chronic fatigue. She reported heavy menstrual bleeding,  Her LMP was 10/20/2022  The patient otherwise has no other complaints. Denied fever, chills, night sweat, unintentional weight loss, skin lumps or bumps, acute bleeding or bruising issues. Denied headache, acute vision change, dizziness, chest pain, worsen shortness of breath, palpitation, productive cough, nausea, vomiting, abdominal pain, altered bowel habits, dysuria, worsen bone pain or back pain, new focal numbness or weakness.          Past Medical History, Family History, and Social History:    Past Medical History:   Diagnosis Date    Bipolar 1 disorder ([address]     Borderline diabetes     Genital herpes     Hyperlipidemia     Hypertension      Past Surgical History:   Procedure Laterality Date    COLONOSCOPY N/A 2022    COLONOSCOPY/ Polypectomy performed by Nancie Fernandez MD at [facility]    HX  SECTION       Social History     Socioeconomic History    Marital status: SINGLE     Spouse name: Not on file    Number of children: Not on file    Years of education: Not on file    Highest education level: Not on file   Occupational History    Not on file   Tobacco Use    Smoking status: Never    Smokeless tobacco: Never   Vaping Use    Vaping Use: Never used   Substance and Sexual Activity    Alcohol use: No     Alcohol/week: 0.0 standard drinks    Drug use: No    Sexual activity: Yes     Partners: Male     Birth control/protection: None   Other Topics Concern     Service No    Blood Transfusions No    Caffeine Concern Yes     Comment: ocassionally     Occupational Exposure Not Asked    Hobby Hazards Not Asked    Sleep Concern No    Stress Concern No    Weight Concern Yes    Special Diet No    Back Care Not Asked    Exercise No    Bike Helmet Not Asked    Seat Belt Yes    Self-Exams Yes   Social History Narrative    Not on file     Social Determinants of Health     Financial Resource Strain: Not on file   Food Insecurity: Not on file   Transportation Needs: Not on file   Physical Activity: Not on file   Stress: Not on file   Social Connections: Not on file   Intimate Partner Violence: Not on file   Housing Stability: Not on file     Family History   Problem Relation Age of Onset    Hypertension Mother     Heart Disease Father     Heart Surgery Father     Hypertension Father     Anemia Sister     Asthma Sister     Diabetes Brother     Diabetes Maternal Aunt      Current Outpatient Medications   Medication Sig Dispense Refill    guaiFENesin-dextromethorphan (ROBITUSSIN DM) 100-10 mg/5 mL syrup Take 5 mL by mouth three (3) times daily as needed for Cough or Congestion. 236 mL 0    levocetirizine (XYZAL) 5 mg tablet Take 5 mg by mouth daily. hydroCHLOROthiazide (HYDRODIURIL) 25 mg tablet take 1 tablet by mouth once daily 90 Tablet 0    atorvastatin (LIPITOR) 10 mg tablet Take 1 Tablet by mouth daily. 90 Tablet 1    loratadine (CLARITIN) 10 mg tablet Take 1 Tablet by mouth daily as needed for Allergies. 90 Tablet 0    fluticasone propionate (FLONASE) 50 mcg/actuation nasal spray 2 Sprays by Both Nostrils route daily.  1 Each 0    MULTIVITAMIN PO Take by mouth. ARIPiprazole (ABILIFY) 15 mg tablet Take 20 mg by mouth daily. Review of Systems   Constitutional:  Positive for malaise/fatigue. Negative for chills, diaphoresis, fever and weight loss. Respiratory:  Negative for cough, hemoptysis, shortness of breath and wheezing. Cardiovascular:  Negative for chest pain, palpitations and leg swelling. Gastrointestinal:  Negative for abdominal pain, diarrhea, heartburn, nausea and vomiting. Genitourinary:  Negative for dysuria, frequency, hematuria and urgency. Musculoskeletal:  Negative for joint pain and myalgias. Skin:  Negative for itching and rash. Neurological:  Negative for dizziness, seizures, weakness and headaches. Psychiatric/Behavioral:  Negative for depression. The patient does not have insomnia. Objective:   Visit Vitals  /75   Pulse 91   Temp 98.7 °F (37.1 °C)   Ht 5' 4\" (1.626 m)   Wt 127.7 kg (281 lb 9.6 oz)   SpO2 94%   BMI 48.34 kg/m²       ECOG Performance Status (grade): 0  0 - able to carry on all pre-disease activity w/out restriction  1 - restricted but able to carry out light work  2 - ambulatory and can self- care but unable to carry out work  3 - bed or chair >50% of waking hours  4 - completely disable, total care, confined to bed or chair    Physical Exam  Constitutional:       Appearance: Normal appearance. HENT:      Head: Normocephalic and atraumatic. Eyes:      Pupils: Pupils are equal, round, and reactive to light. Cardiovascular:      Rate and Rhythm: Normal rate and regular rhythm. Heart sounds: Normal heart sounds. Pulmonary:      Effort: Pulmonary effort is normal.      Breath sounds: Normal breath sounds. Abdominal:      General: Bowel sounds are normal.      Palpations: Abdomen is soft. Tenderness: There is no abdominal tenderness. There is no guarding. Musculoskeletal:         General: Normal range of motion. Cervical back: Neck supple.       Right lower leg: No edema. Left lower leg: No edema. Skin:     General: Skin is warm. Neurological:      General: No focal deficit present. Mental Status: She is alert and oriented to person, place, and time. Mental status is at baseline. Diagnostics:      No results found for this or any previous visit (from the past 96 hour(s)). Imaging:  No results found for this or any previous visit. No results found for this or any previous visit. No results found for this or any previous visit. Pathology          Assessment:                                        1. Microcytic anemia    2. Thrombocytosis        Plan:                                        # Microcytic anemia  # Thrombocytosis  -- Past medical history significant of hypertension, hyperlipidemia, mood disorders. -- Lab reviews indicated chronic anemia since at least 11/11/2020, hemoglobin 11.8  -- 9/17/2022 CBC reported hemoglobin 10.1, hematocrit 33.5%, MCV 75, platelet 774,998, WBC 7.1.  -- Her colonoscopy was done in July 2022.    -- The patient reported she has stopped taking iron pills. She experienced some constipation from iron pills. -- The patient reported low energy levels and on of chronic fatigue. -- She reported heavy menstrual bleeding,  Her LMP was 10/20/2022  -- Today I have reviewed with the patient about above lab findings and differential diagnosis    Plan:  --Initial lab work: CBC with differential, chemistry,Iron profiles, ferritin, and reticulocyte count  -- The patient will be notified if any interventions is warranted. Further workup will be guided by results from aforementioned initial study. -- We will see the patient back in about 3 weeks to review report. Always sooner if required.         Orders Placed This Encounter    METABOLIC PANEL, COMPREHENSIVE     Standing Status:   Future     Standing Expiration Date:   11/11/2023    IRON PROFILE     Standing Status:   Future     Standing Expiration Date: 11/11/2023    FERRITIN     Standing Status:   Future     Standing Expiration Date:   11/10/2023    CBC WITH AUTOMATED DIFF     Standing Status:   Future     Standing Expiration Date:   11/11/2023    RETICULOCYTE COUNT     Standing Status:   Future     Standing Expiration Date:   11/10/2023           Ms. Vishnu Hewitt has a reminder for a \"due or due soon\" health maintenance. I have asked that she contact her primary care provider for follow-up on this health maintenance. All of patient's questions answered to their apparent satisfaction. They verbally show understanding and agreement with aforementioned plan. Richard Ansari MD  11/10/2022              Above mentioned total time spent for this encounter with more than 50% of the time spent in face-to-face counseling, discussing on diagnosis and management plan going forward, and co-ordination of care. Parts of this document has been produced using Dragon dictation system. Unrecognized errors in transcription may be present. Please do not hesitate to reach out for any questions or clarifications.       CC: Michele Escobar MD

## 2022-11-11 LAB
A-G RATIO,AGRAT: 1.5 RATIO (ref 1.1–2.6)
ABSOLUTE LYMPHOCYTE COUNT, 10803: 3.5 K/UL (ref 1–4.8)
ABSOLUTE RETIC COUNT,RETA: 0.07 M/UL (ref 0.03–0.1)
ALBUMIN SERPL-MCNC: 4.6 G/DL (ref 3.5–5)
ALP SERPL-CCNC: 98 U/L (ref 25–115)
ALT SERPL-CCNC: 15 U/L (ref 5–40)
ANION GAP SERPL CALC-SCNC: 9 MMOL/L (ref 3–15)
AST SERPL W P-5'-P-CCNC: 14 U/L (ref 10–37)
BASOPHILS # BLD: 0.1 K/UL (ref 0–0.2)
BASOPHILS NFR BLD: 1 % (ref 0–2)
BILIRUB SERPL-MCNC: 0.8 MG/DL (ref 0.2–1.2)
BUN SERPL-MCNC: 11 MG/DL (ref 6–22)
CALCIUM SERPL-MCNC: 9.6 MG/DL (ref 8.4–10.5)
CHLORIDE SERPL-SCNC: 97 MMOL/L (ref 98–110)
CO2 SERPL-SCNC: 32 MMOL/L (ref 20–32)
CREAT SERPL-MCNC: 0.8 MG/DL (ref 0.5–1.2)
EOSINOPHIL # BLD: 0.2 K/UL (ref 0–0.5)
EOSINOPHIL NFR BLD: 2 % (ref 0–6)
ERYTHROCYTE [DISTWIDTH] IN BLOOD BY AUTOMATED COUNT: 16.4 % (ref 10–15.5)
FE % SATURATION,PSAT: 11 % (ref 20–50)
FERRITIN SERPL-MCNC: 15 NG/ML (ref 10–291)
GLOBULIN,GLOB: 3 G/DL (ref 2–4)
GLOMERULAR FILTRATION RATE: >60 ML/MIN/1.73 SQ.M.
GLUCOSE SERPL-MCNC: 109 MG/DL (ref 70–99)
GRANULOCYTES,GRANS: 54 % (ref 40–75)
HCT VFR BLD AUTO: 32.8 % (ref 35.1–48)
HGB BLD-MCNC: 9.7 G/DL (ref 11.7–16)
IRON,IRN: 43 MCG/DL (ref 30–160)
LYMPHOCYTES, LYMLT: 36 % (ref 20–45)
MCH RBC QN AUTO: 23 PG (ref 26–34)
MCHC RBC AUTO-ENTMCNC: 30 G/DL (ref 31–36)
MCV RBC AUTO: 76 FL (ref 80–99)
MONOCYTES # BLD: 0.7 K/UL (ref 0.1–1)
MONOCYTES NFR BLD: 8 % (ref 3–12)
NEUTROPHILS # BLD AUTO: 5.2 K/UL (ref 1.8–7.7)
PLATELET # BLD AUTO: 535 K/UL (ref 140–440)
PMV BLD AUTO: 9.5 FL (ref 9–13)
POTASSIUM SERPL-SCNC: 3.6 MMOL/L (ref 3.5–5.5)
PROT SERPL-MCNC: 7.6 G/DL (ref 6.4–8.3)
RBC # BLD AUTO: 4.32 M/UL (ref 3.8–5.2)
RETIC COUNT, AUTOMATED,RETIC: 1.6 % (ref 0.5–2)
RETIC HEMOGLOBIN: 24.5 PG (ref 28.2–38.2)
SODIUM SERPL-SCNC: 138 MMOL/L (ref 133–145)
TIBC,TIBC: 391 MCG/DL (ref 228–428)
UIBC SERPL-MCNC: 348 MCG/DL (ref 110–370)
WBC # BLD AUTO: 9.6 K/UL (ref 4–11)

## 2022-12-13 ENCOUNTER — OFFICE VISIT (OUTPATIENT)
Dept: FAMILY MEDICINE CLINIC | Age: 47
End: 2022-12-13
Payer: MEDICAID

## 2022-12-13 ENCOUNTER — TELEPHONE (OUTPATIENT)
Dept: FAMILY MEDICINE CLINIC | Age: 47
End: 2022-12-13

## 2022-12-13 VITALS
DIASTOLIC BLOOD PRESSURE: 69 MMHG | HEART RATE: 77 BPM | TEMPERATURE: 97.6 F | RESPIRATION RATE: 20 BRPM | SYSTOLIC BLOOD PRESSURE: 119 MMHG | WEIGHT: 279 LBS | OXYGEN SATURATION: 99 % | HEIGHT: 64 IN | BODY MASS INDEX: 47.63 KG/M2

## 2022-12-13 DIAGNOSIS — E78.5 HYPERLIPIDEMIA, UNSPECIFIED HYPERLIPIDEMIA TYPE: ICD-10-CM

## 2022-12-13 DIAGNOSIS — Z00.00 GENERAL MEDICAL EXAM: Primary | ICD-10-CM

## 2022-12-13 DIAGNOSIS — R73.03 PREDIABETES: ICD-10-CM

## 2022-12-13 DIAGNOSIS — Z23 ENCOUNTER FOR IMMUNIZATION: ICD-10-CM

## 2022-12-13 DIAGNOSIS — F31.9 BIPOLAR 1 DISORDER (HCC): ICD-10-CM

## 2022-12-13 DIAGNOSIS — D50.9 MICROCYTIC ANEMIA: ICD-10-CM

## 2022-12-13 DIAGNOSIS — I10 ESSENTIAL HYPERTENSION: ICD-10-CM

## 2022-12-13 PROCEDURE — 90686 IIV4 VACC NO PRSV 0.5 ML IM: CPT | Performed by: FAMILY MEDICINE

## 2022-12-13 PROCEDURE — 99396 PREV VISIT EST AGE 40-64: CPT | Performed by: FAMILY MEDICINE

## 2022-12-13 PROCEDURE — 3078F DIAST BP <80 MM HG: CPT | Performed by: FAMILY MEDICINE

## 2022-12-13 PROCEDURE — 3074F SYST BP LT 130 MM HG: CPT | Performed by: FAMILY MEDICINE

## 2022-12-13 RX ORDER — LABETALOL 300 MG/1
300 TABLET, FILM COATED ORAL 2 TIMES DAILY
COMMUNITY
Start: 2022-11-14

## 2022-12-13 NOTE — PROGRESS NOTES
HPI  Arslan Castillo comes in for complete physical exam.    General medical exam: Complete physical exam is stable. Bipolar disorder: Patient has bipolar disorder. She is followed up with behavioral health specialist.  She is on Abilify. She is stable on medication and tolerating. She will continue current treatment plan. Prediabetes: Patient has prediabetes. She still excellent dietary modification. HbA1c 6.1. Continue current treatment plan. Dyslipidemia: Patient has dyslipidemia. He is on Lipitor 10 mg daily. Stable on medication and tolerating. Lipid panel has been stable. Hypertension: Patient has hypertension blood pressure stable. Denies headache, changes in vision or focal weakness. Patient is on labetalol. Continue current treatment plan. She will take a low-sodium diet. Microcytic anemia: Patient has macrocytic anemia. He is followed up by the hematologist.  Currently stable. She will continue with management as recommended by the hematologist.  Morbid obesity: Patient has a BMI of 47.89. She will intensify lifestyle and dietary modification. Health maintenance: Patient will get the flu vaccine today. Past Medical History  Past Medical History:   Diagnosis Date    Bipolar 1 disorder ([facility] [address]     Borderline diabetes     Genital herpes     Hyperlipidemia     Hypertension        Surgical History  Past Surgical History:   Procedure Laterality Date    COLONOSCOPY N/A 7/27/2022    COLONOSCOPY/ Polypectomy performed by Valdene Shone, MD at [facility]        Medications  Current Outpatient Medications   Medication Sig Dispense Refill    guaiFENesin-dextromethorphan (ROBITUSSIN DM) 100-10 mg/5 mL syrup Take 5 mL by mouth three (3) times daily as needed for Cough or Congestion. 236 mL 0    hydroCHLOROthiazide (HYDRODIURIL) 25 mg tablet take 1 tablet by mouth once daily 90 Tablet 0    atorvastatin (LIPITOR) 10 mg tablet Take 1 Tablet by mouth daily.  80 Tablet 1    loratadine (CLARITIN) 10 mg tablet Take 1 Tablet by mouth daily as needed for Allergies. 90 Tablet 0    fluticasone propionate (FLONASE) 50 mcg/actuation nasal spray 2 Sprays by Both Nostrils route daily. 1 Each 0    MULTIVITAMIN PO Take  by mouth. ARIPiprazole (ABILIFY) 15 mg tablet Take 20 mg by mouth daily. labetaloL (NORMODYNE) 300 mg tablet Take 300 mg by mouth two (2) times a day.          Allergies  No Known Allergies    Family History  Family History   Problem Relation Age of Onset    Hypertension Mother     Heart Disease Father     Heart Surgery Father     Hypertension Father     Anemia Sister     Asthma Sister     Diabetes Brother     Diabetes Maternal Aunt        Social History  Social History     Socioeconomic History    Marital status: SINGLE     Spouse name: Not on file    Number of children: Not on file    Years of education: Not on file    Highest education level: Not on file   Occupational History    Not on file   Tobacco Use    Smoking status: Never    Smokeless tobacco: Never   Vaping Use    Vaping Use: Never used   Substance and Sexual Activity    Alcohol use: No     Alcohol/week: 0.0 standard drinks    Drug use: No    Sexual activity: Yes     Partners: Male     Birth control/protection: None   Other Topics Concern     Service No    Blood Transfusions No    Caffeine Concern Yes     Comment: ocassionally     Occupational Exposure Not Asked    Hobby Hazards Not Asked    Sleep Concern No    Stress Concern No    Weight Concern Yes    Special Diet No    Back Care Not Asked    Exercise No    Bike Helmet Not Asked    Seat Belt Yes    Self-Exams Yes   Social History Narrative    Not on file     Social Determinants of Health     Financial Resource Strain: Not on file   Food Insecurity: Not on file   Transportation Needs: Not on file   Physical Activity: Not on file   Stress: Not on file   Social Connections: Not on file   Intimate Partner Violence: Not on file   Housing Stability: Not on file       Review of Systems  Review of Systems - Review of all systems is negative except as noted above in the HPI.     Vital Signs  Visit Vitals  /69 (BP 1 Location: Left upper arm, BP Patient Position: Sitting, BP Cuff Size: Large adult)   Pulse 77   Temp 97.6 °F (36.4 °C)   Resp 20   Ht 5' 4\" (1.626 m)   Wt 279 lb (126.6 kg)   LMP 11/22/2022   SpO2 99%   BMI 47.89 kg/m²         Physical Exam  Physical Examination: General appearance - alert, well appearing, and in no distress, oriented to person, place, and time, overweight, acyanotic, in no respiratory distress, and well hydrated  Mental status - alert, oriented to person, place, and time, affect appropriate to mood  Eyes - pupils equal and reactive, extraocular eye movements intact  Ears - hearing grossly normal bilaterally  Nose - normal and patent, no erythema, discharge or polyps  Mouth - mucous membranes moist, pharynx normal without lesions  Neck - supple, no significant adenopathy  Lymphatics - no palpable lymphadenopathy, no hepatosplenomegaly  Chest - clear to auscultation, no wheezes, rales or rhonchi, symmetric air entry  Heart - S1 and S2 normal, regular rhythm  Abdomen - no rebound tenderness noted  Back exam - full range of motion, no tenderness, palpable spasm or pain on motion  Neurological - motor and sensory grossly normal bilaterally, normal muscle tone, no tremors, strength 5/5  Musculoskeletal - no muscular tenderness noted  Extremities - no pedal edema noted, intact peripheral pulses      Results  Results for orders placed or performed in visit on 11/10/22   RETICULOCYTE COUNT   Result Value Ref Range    Reticulocyte count 1.6 0.5 - 2.0 %    Absolute Retic Cnt. 0.0670 0.0300 - 0.1 M/uL    RETIC HEMOGLOBIN 24.5 (L) 28.2 - 38.2 pg   CBC WITH AUTOMATED DIFF   Result Value Ref Range    WBC 9.6 4.0 - 11.0 K/uL    RBC 4.32 3.80 - 5.20 M/uL    HGB 9.7 (L) 11.7 - 16.0 g/dL    HCT 32.8 (L) 35.1 - 48.0 %    MCV 76 (L) 80 - 99 fL    MCH 23 (L) 26 - 34 pg    MCHC 30 (L) 31 - 36 g/dL    RDW 16.4 (H) 10.0 - 15.5 %    PLATELET 449 (H) 485 - 440 K/uL    MPV 9.5 9.0 - 13.0 fL    NEUTROPHILS 54 40 - 75 %    Lymphocytes 36 20 - 45 %    MONOCYTES 8 3 - 12 %    EOSINOPHILS 2 0 - 6 %    BASOPHILS 1 0 - 2 %    ABS. NEUTROPHILS 5.2 1.8 - 7.7 K/uL    ABSOLUTE LYMPHOCYTE COUNT 3.5 1.0 - 4.8 K/uL    ABS. MONOCYTES 0.7 0.1 - 1.0 K/uL    ABS. EOSINOPHILS 0.2 0.0 - 0.5 K/uL    ABS. BASOPHILS 0.1 0.0 - 0.2 K/uL   FERRITIN   Result Value Ref Range    Ferritin 15 10 - 291 ng/mL   IRON PROFILE   Result Value Ref Range    Iron 43 30 - 160 mcg/dL    UIBC 348 110 - 370 mcg/dL    TIBC 391 228 - 428 mcg/dL    Iron % saturation 11 (L) 20 - 50 %   METABOLIC PANEL, COMPREHENSIVE   Result Value Ref Range    Glucose 109 (H) 70 - 99 mg/dL    BUN 11 6 - 22 mg/dL    Creatinine 0.8 0.5 - 1.2 mg/dL    Sodium 138 133 - 145 mmol/L    Potassium 3.6 3.5 - 5.5 mmol/L    Chloride 97 (L) 98 - 110 mmol/L    CO2 32 20 - 32 mmol/L    AST (SGOT) 14 10 - 37 U/L    ALT (SGPT) 15 5 - 40 U/L    Alk. phosphatase 98 25 - 115 U/L    Bilirubin, total 0.8 0.2 - 1.2 mg/dL    Calcium 9.6 8.4 - 10.5 mg/dL    Protein, total 7.6 6.4 - 8.3 g/dL    Albumin 4.6 3.5 - 5.0 g/dL    A-G Ratio 1.5 1.1 - 2.6 ratio    Globulin 3.0 2.0 - 4.0 g/dL    GLOMERULAR FILTRATION RATE >60.0 >60.0 mL/min/1.73 sq.m. Anion gap 9.0 3.0 - 15.0 mmol/L       ASSESSMENT and PLAN    ICD-10-CM ICD-9-CM    1. General medical exam  Z00.00 V70.9       2. Essential hypertension  I10 401.9       3. Prediabetes  R73.03 790.29       4. Microcytic anemia  D50.9 280.9       5. Bipolar 1 disorder (HCC)  F31.9 296.7       6. Hyperlipidemia, unspecified hyperlipidemia type  E78.5 272.4       7.  Encounter for immunization  Z23 V03.89 INFLUENZA, FLUARIX, FLULAVAL, FLUZONE (AGE 6 MO+), AFLURIA(AGE 3Y+) IM, PF, 0.5 ML        lab results and schedule of future lab studies reviewed with patient  reviewed diet, exercise and weight control  cardiovascular risk and specific lipid/LDL goals reviewed  reviewed medications and side effects in detail      I have discussed the diagnosis with the patient and the intended plan of care as seen in the above orders. The patient has received an after-visit summary and questions were answered concerning future plans. I have discussed medication, side effects, and warnings with the patient in detail. The patient verbalized understanding and is in agreement with the plan of care. The patient will contact the office with any additional concerns. Stevie Sosa MD    PLEASE NOTE:   This document has been produced using voice recognition software.  Unrecognized errors in transcription may be present

## 2022-12-13 NOTE — LETTER
12/13/2022 3:31 PM    Ms. Huggins 20      To Whom It May Concern:    Cristy Calderon is currently under the care of [address]. She was seen today for physical exam. This exam is stable. If there are questions or concerns please have the patient contact our office.         Sincerely,      Oswald Carvajal MD

## 2022-12-13 NOTE — PROGRESS NOTES
1. \"Have you been to the ER, urgent care clinic since your last visit? Hospitalized since your last visit? \" No    2. \"Have you seen or consulted any other health care providers outside of the [address] [place] [address] since your last visit? \" No     3. For patients aged 39-70: Has the patient had a colonoscopy / FIT/ Cologuard? No      If the patient is female:    4. For patients aged 41-77: Has the patient had a mammogram within the past 2 years? Yes - no Care Gap present      5. For patients aged 21-65: Has the patient had a pap smear?  Yes - no Care Gap present

## 2022-12-13 NOTE — PROGRESS NOTES
After obtaining consent, and per orders of Dr. Raheem Dutton, injection of [facility] was given by Anna Nelson. Patient instructed to remain in clinic for 20 minutes afterwards, and to report any adverse reaction to me immediately.

## 2022-12-14 ENCOUNTER — OFFICE VISIT (OUTPATIENT)
Dept: ONCOLOGY | Age: 47
End: 2022-12-14
Payer: MEDICAID

## 2022-12-14 VITALS
SYSTOLIC BLOOD PRESSURE: 121 MMHG | DIASTOLIC BLOOD PRESSURE: 75 MMHG | HEART RATE: 79 BPM | OXYGEN SATURATION: 97 % | HEIGHT: 64 IN | WEIGHT: 280 LBS | BODY MASS INDEX: 47.8 KG/M2 | RESPIRATION RATE: 18 BRPM

## 2022-12-14 DIAGNOSIS — D50.0 IRON DEFICIENCY ANEMIA DUE TO CHRONIC BLOOD LOSS: Primary | ICD-10-CM

## 2022-12-14 DIAGNOSIS — D75.839 THROMBOCYTOSIS: ICD-10-CM

## 2022-12-14 DIAGNOSIS — D50.9 MICROCYTIC ANEMIA: ICD-10-CM

## 2022-12-14 PROCEDURE — 99213 OFFICE O/P EST LOW 20 MIN: CPT | Performed by: INTERNAL MEDICINE

## 2022-12-14 NOTE — PROGRESS NOTES
Hematology/Oncology Note      Date: 2022    Name: Cherri Franco  : 1975        Kayla Justice MD         Subjective:     Chief complaint: iron deficiency anemia    History of Present Illness:   Ms. Charanjit Vargas is a most pleasant 56y.o. year old female who was seen for consultation of iron deficiency anemia. Lab reviews indicated chronic anemia since at least 2020, hemoglobin 11.8  The patient reported she has stopped taking iron pills. She experienced some constipation from iron pills. The patient reported low energy levels and on of chronic fatigue. She reported heavy menstrual bleeding,  Her LMP was 2022  The patient otherwise has no other complaints. Denied fever, chills, night sweat, unintentional weight loss, skin lumps or bumps, acute bleeding or bruising issues. Denied headache, acute vision change, dizziness, chest pain, worsen shortness of breath, palpitation, productive cough, nausea, vomiting, abdominal pain, altered bowel habits, dysuria, worsen bone pain or back pain, new focal numbness or weakness.          Past Medical History, Family History, and Social History:    Past Medical History:   Diagnosis Date    Bipolar 1 disorder ([facility] [address]     Borderline diabetes     Genital herpes     Hyperlipidemia     Hypertension      Past Surgical History:   Procedure Laterality Date    COLONOSCOPY N/A 2022    COLONOSCOPY/ Polypectomy performed by Goldy Thompson MD at [facility]    HX  SECTION       Social History     Socioeconomic History    Marital status: SINGLE     Spouse name: Not on file    Number of children: Not on file    Years of education: Not on file    Highest education level: Not on file   Occupational History    Not on file   Tobacco Use    Smoking status: Never    Smokeless tobacco: Never   Vaping Use    Vaping Use: Never used   Substance and Sexual Activity    Alcohol use: No     Alcohol/week: 0.0 standard drinks    Drug use: No    Sexual activity: Yes     Partners: Male     Birth control/protection: None   Other Topics Concern     Service No    Blood Transfusions No    Caffeine Concern Yes     Comment: ocassionally     Occupational Exposure Not Asked    Hobby Hazards Not Asked    Sleep Concern No    Stress Concern No    Weight Concern Yes    Special Diet No    Back Care Not Asked    Exercise No    Bike Helmet Not Asked    Seat Belt Yes    Self-Exams Yes   Social History Narrative    Not on file     Social Determinants of Health     Financial Resource Strain: Not on file   Food Insecurity: Not on file   Transportation Needs: Not on file   Physical Activity: Not on file   Stress: Not on file   Social Connections: Not on file   Intimate Partner Violence: Not on file   Housing Stability: Not on file     Family History   Problem Relation Age of Onset    Hypertension Mother     Heart Disease Father     Heart Surgery Father     Hypertension Father     Anemia Sister     Asthma Sister     Diabetes Brother     Diabetes Maternal Aunt      Current Outpatient Medications   Medication Sig Dispense Refill    labetaloL (NORMODYNE) 300 mg tablet Take 300 mg by mouth two (2) times a day. guaiFENesin-dextromethorphan (ROBITUSSIN DM) 100-10 mg/5 mL syrup Take 5 mL by mouth three (3) times daily as needed for Cough or Congestion. 236 mL 0    hydroCHLOROthiazide (HYDRODIURIL) 25 mg tablet take 1 tablet by mouth once daily 90 Tablet 0    atorvastatin (LIPITOR) 10 mg tablet Take 1 Tablet by mouth daily. 90 Tablet 1    loratadine (CLARITIN) 10 mg tablet Take 1 Tablet by mouth daily as needed for Allergies. 90 Tablet 0    fluticasone propionate (FLONASE) 50 mcg/actuation nasal spray 2 Sprays by Both Nostrils route daily. 1 Each 0    MULTIVITAMIN PO Take  by mouth. ARIPiprazole (ABILIFY) 15 mg tablet Take 20 mg by mouth daily. Review of Systems   Constitutional:  Positive for malaise/fatigue. Negative for chills, diaphoresis, fever and weight loss. Respiratory:  Negative for cough, hemoptysis, shortness of breath and wheezing. Cardiovascular:  Negative for chest pain, palpitations and leg swelling. Gastrointestinal:  Negative for abdominal pain, diarrhea, heartburn, nausea and vomiting. Genitourinary:  Negative for dysuria, frequency, hematuria and urgency. Musculoskeletal:  Negative for joint pain and myalgias. Skin:  Negative for itching and rash. Neurological:  Negative for dizziness, seizures, weakness and headaches. Psychiatric/Behavioral:  Negative for depression. The patient does not have insomnia. Objective:   Visit Vitals  /75   Pulse 79   Resp 18   Ht 5' 4\" (1.626 m)   Wt 127 kg (280 lb)   LMP 11/22/2022   SpO2 97%   BMI 48.06 kg/m²       ECOG Performance Status (grade): 0  0 - able to carry on all pre-disease activity w/out restriction  1 - restricted but able to carry out light work  2 - ambulatory and can self- care but unable to carry out work  3 - bed or chair >50% of waking hours  4 - completely disable, total care, confined to bed or chair    Physical Exam  Constitutional:       Appearance: Normal appearance. HENT:      Head: Normocephalic and atraumatic. Eyes:      Pupils: Pupils are equal, round, and reactive to light. Cardiovascular:      Rate and Rhythm: Normal rate and regular rhythm. Heart sounds: Normal heart sounds. Pulmonary:      Effort: Pulmonary effort is normal.      Breath sounds: Normal breath sounds. Abdominal:      General: Bowel sounds are normal.      Palpations: Abdomen is soft. Tenderness: There is no abdominal tenderness. There is no guarding. Musculoskeletal:         General: Normal range of motion. Cervical back: Neck supple. Right lower leg: No edema. Left lower leg: No edema. Skin:     General: Skin is warm. Neurological:      General: No focal deficit present. Mental Status: She is alert and oriented to person, place, and time.  Mental status is at baseline. Diagnostics:      No results found for this or any previous visit (from the past 96 hour(s)). Imaging:  No results found for this or any previous visit. No results found for this or any previous visit. No results found for this or any previous visit. Pathology          Assessment:                                        1. Iron deficiency anemia due to chronic blood loss    2. Microcytic anemia    3. Thrombocytosis        Plan:                                        # Iron deficiency anemia/ Microcytic anemia  -- Past medical history significant of hypertension, hyperlipidemia, mood disorders. -- Lab reviews indicated chronic anemia since at least 11/11/2020, hemoglobin 11.8  -- 9/17/2022 CBC reported hemoglobin 10.1, hematocrit 33.5%, MCV 75, platelet 121,214, WBC 7.1.  -- Her colonoscopy was done in July 2022.    -- The patient reported she has stopped taking iron pills. -- The patient reported low energy levels and on of chronic fatigue. -- She reported heavy menstrual bleeding,  Her LMP was 11/22/2022  -- Today I have reviewed with the patient about recent lab reports. 11/10/2022 CBC reported hemoglobin 9.7, hematocrit 32.8%, MCV 76, platelet 686,867, WBC 9.6. Iron saturation 11%, ferritin 15, TIBC 391. Plan:  -- Iron therapy. We have discussed about IV Iron. I have explained that the potential side effect of IV iron which included but not limited to mild reaction with skin rashes, fever, chills, shortness of breath, or severe reactions. The patient was willing to proceed with treatment. -- IV Injectafer x 2, 1 week apart. -- Continue lab check CBC with differential, chemistry, iron profiles, ferritin, reticulocyte count post Iron therapy. -- [facility] GYN for menorrhagia treatment. -- The patient was agreeable with the plan. -- We will see the patient back in about 4 months. Always sooner if required.         # Thrombocytosis  -- 11/10/2022 CBC reported hemoglobin 9.7, hematocrit 32.8%, MCV 76, platelet 493,162, WBC 9.6. Iron saturation 11%, ferritin 15, TIBC 391.  -- Likely reactive, 2/2 ALESSANDRO  -- Will continue to monitor CBC with Iron therapy. No orders of the defined types were placed in this encounter. Ms. Nuris Hayes has a reminder for a \"due or due soon\" health maintenance. I have asked that she contact her primary care provider for follow-up on this health maintenance. All of patient's questions answered to their apparent satisfaction. They verbally show understanding and agreement with aforementioned plan. Kevin Bermudez MD  12/14/2022              Above mentioned total time spent for this encounter with more than 50% of the time spent in face-to-face counseling, discussing on diagnosis and management plan going forward, and co-ordination of care. Parts of this document has been produced using Dragon dictation system. Unrecognized errors in transcription may be present. Please do not hesitate to reach out for any questions or clarifications.       CC: Alyse Calixto MD

## 2022-12-16 PROBLEM — D50.9 IRON DEFICIENCY ANEMIA: Status: ACTIVE | Noted: 2022-12-16

## 2022-12-16 RX ORDER — DIPHENHYDRAMINE HYDROCHLORIDE 50 MG/ML
25 INJECTION, SOLUTION INTRAMUSCULAR; INTRAVENOUS AS NEEDED
Status: CANCELLED
Start: 2022-12-22

## 2022-12-16 RX ORDER — HEPARIN 100 UNIT/ML
500 SYRINGE INTRAVENOUS AS NEEDED
Status: CANCELLED
Start: 2022-12-22

## 2022-12-16 RX ORDER — ACETAMINOPHEN 325 MG/1
650 TABLET ORAL AS NEEDED
Status: CANCELLED
Start: 2022-12-22

## 2022-12-16 RX ORDER — DIPHENHYDRAMINE HYDROCHLORIDE 50 MG/ML
50 INJECTION, SOLUTION INTRAMUSCULAR; INTRAVENOUS AS NEEDED
Status: CANCELLED
Start: 2022-12-22

## 2022-12-16 RX ORDER — ALBUTEROL SULFATE 0.83 MG/ML
2.5 SOLUTION RESPIRATORY (INHALATION) AS NEEDED
Status: CANCELLED
Start: 2022-12-22

## 2022-12-16 RX ORDER — HYDROCORTISONE SODIUM SUCCINATE 100 MG/2ML
100 INJECTION, POWDER, FOR SOLUTION INTRAMUSCULAR; INTRAVENOUS AS NEEDED
Status: CANCELLED | OUTPATIENT
Start: 2022-12-22

## 2022-12-16 RX ORDER — SODIUM CHLORIDE 9 MG/ML
5-40 INJECTION INTRAMUSCULAR; INTRAVENOUS; SUBCUTANEOUS AS NEEDED
Status: CANCELLED | OUTPATIENT
Start: 2022-12-22

## 2022-12-16 RX ORDER — EPINEPHRINE 1 MG/ML
0.3 INJECTION, SOLUTION, CONCENTRATE INTRAVENOUS AS NEEDED
Status: CANCELLED | OUTPATIENT
Start: 2022-12-22

## 2022-12-16 RX ORDER — ONDANSETRON 2 MG/ML
8 INJECTION INTRAMUSCULAR; INTRAVENOUS AS NEEDED
Status: CANCELLED | OUTPATIENT
Start: 2022-12-22

## 2022-12-16 RX ORDER — SODIUM CHLORIDE 9 MG/ML
5-250 INJECTION, SOLUTION INTRAVENOUS AS NEEDED
Status: CANCELLED | OUTPATIENT
Start: 2022-12-22

## 2022-12-22 ENCOUNTER — HOSPITAL ENCOUNTER (OUTPATIENT)
Dept: INFUSION THERAPY | Age: 47
End: 2022-12-22
Payer: MEDICAID

## 2022-12-22 VITALS
SYSTOLIC BLOOD PRESSURE: 100 MMHG | HEART RATE: 67 BPM | DIASTOLIC BLOOD PRESSURE: 67 MMHG | OXYGEN SATURATION: 95 % | TEMPERATURE: 97.6 F | RESPIRATION RATE: 20 BRPM

## 2022-12-22 DIAGNOSIS — D50.8 OTHER IRON DEFICIENCY ANEMIA: Primary | ICD-10-CM

## 2022-12-22 PROCEDURE — 74011250636 HC RX REV CODE- 250/636: Performed by: INTERNAL MEDICINE

## 2022-12-22 PROCEDURE — 74011000250 HC RX REV CODE- 250: Performed by: INTERNAL MEDICINE

## 2022-12-22 PROCEDURE — 96365 THER/PROPH/DIAG IV INF INIT: CPT

## 2022-12-22 RX ORDER — SODIUM CHLORIDE 0.9 % (FLUSH) 0.9 %
5-40 SYRINGE (ML) INJECTION AS NEEDED
Status: DISPENSED | OUTPATIENT
Start: 2022-12-22 | End: 2022-12-22

## 2022-12-22 RX ORDER — HYDROCORTISONE SODIUM SUCCINATE 100 MG/2ML
100 INJECTION, POWDER, FOR SOLUTION INTRAMUSCULAR; INTRAVENOUS AS NEEDED
Status: CANCELLED | OUTPATIENT
Start: 2022-12-29

## 2022-12-22 RX ORDER — HEPARIN 100 UNIT/ML
500 SYRINGE INTRAVENOUS AS NEEDED
Status: CANCELLED
Start: 2022-12-29

## 2022-12-22 RX ORDER — DIPHENHYDRAMINE HYDROCHLORIDE 50 MG/ML
25 INJECTION, SOLUTION INTRAMUSCULAR; INTRAVENOUS AS NEEDED
Status: CANCELLED
Start: 2022-12-29

## 2022-12-22 RX ORDER — ALBUTEROL SULFATE 0.83 MG/ML
2.5 SOLUTION RESPIRATORY (INHALATION) AS NEEDED
Status: CANCELLED
Start: 2022-12-29

## 2022-12-22 RX ORDER — ONDANSETRON 2 MG/ML
8 INJECTION INTRAMUSCULAR; INTRAVENOUS AS NEEDED
Status: CANCELLED | OUTPATIENT
Start: 2022-12-29

## 2022-12-22 RX ORDER — SODIUM CHLORIDE 9 MG/ML
5-250 INJECTION, SOLUTION INTRAVENOUS AS NEEDED
Status: CANCELLED | OUTPATIENT
Start: 2022-12-29

## 2022-12-22 RX ORDER — DIPHENHYDRAMINE HYDROCHLORIDE 50 MG/ML
50 INJECTION, SOLUTION INTRAMUSCULAR; INTRAVENOUS AS NEEDED
Status: CANCELLED
Start: 2022-12-29

## 2022-12-22 RX ORDER — ACETAMINOPHEN 325 MG/1
650 TABLET ORAL AS NEEDED
Status: CANCELLED
Start: 2022-12-29

## 2022-12-22 RX ORDER — SODIUM CHLORIDE 0.9 % (FLUSH) 0.9 %
5-40 SYRINGE (ML) INJECTION AS NEEDED
Status: CANCELLED | OUTPATIENT
Start: 2022-12-29

## 2022-12-22 RX ORDER — SODIUM CHLORIDE 9 MG/ML
5-250 INJECTION, SOLUTION INTRAVENOUS AS NEEDED
Status: DISPENSED | OUTPATIENT
Start: 2022-12-22 | End: 2022-12-22

## 2022-12-22 RX ORDER — EPINEPHRINE 1 MG/ML
0.3 INJECTION, SOLUTION, CONCENTRATE INTRAVENOUS AS NEEDED
Status: CANCELLED | OUTPATIENT
Start: 2022-12-29

## 2022-12-22 RX ORDER — SODIUM CHLORIDE 9 MG/ML
5-40 INJECTION INTRAMUSCULAR; INTRAVENOUS; SUBCUTANEOUS AS NEEDED
Status: CANCELLED | OUTPATIENT
Start: 2022-12-29

## 2022-12-22 RX ADMIN — FERRIC CARBOXYMALTOSE INJECTION 750 MG: 50 INJECTION, SOLUTION INTRAVENOUS at 09:38

## 2022-12-22 RX ADMIN — SODIUM CHLORIDE, PRESERVATIVE FREE 10 ML: 5 INJECTION INTRAVENOUS at 09:38

## 2022-12-22 RX ADMIN — SODIUM CHLORIDE 250 ML/HR: 9 INJECTION, SOLUTION INTRAVENOUS at 09:38

## 2022-12-22 NOTE — PROGRESS NOTES
[facility] Progress Note    Date: 2022    Name: Deniz Navarrete    MRN: [id number]         : 1975    INJECTAFER #1 OF 2    Ms. Bin Kenney was assessed and education was provided. FERRIC CARBOXYMALTOSE CARENOTES given to patient and all questions answered. Ms. Kodi Valentine vitals were reviewed and patient was observed for 5 minutes prior to treatment. Visit Vitals  /67 (BP 1 Location: Right upper arm, BP Patient Position: Sitting)   Pulse 67   Temp 97.6 °F (36.4 °C)   Resp 20   SpO2 95%   Breastfeeding No     #22 PIV started in LEFT AC x1 attempt; brisk blood return and flushed with ease. Ferric Carboxymaltose 750mg mixed in 250cc NS infused over 20 minutes. Upon completion line flushed with NS. Vitals completed. Ms. Bin Kenney observed for 30 minutes. At end of observation period Ms. Bin Kenney denies any s&s of allergic reaction. Vitals completed. PIV removed 2x2 and bandaid applied. Ms. Bin Kenney tolerated the infusion, and had no complaints. Patient armband removed and shredded. Discharge instructions verbalized to patient and all questions answered. Ms. Bin Kenney was discharged from [place] [address] in stable condition at 1035. She is to return on 22 at 1100 for her next injectafer appointment.     Susan Dorsey RN  2022  12:03 PM

## 2022-12-28 ENCOUNTER — OFFICE VISIT (OUTPATIENT)
Dept: FAMILY MEDICINE CLINIC | Age: 47
End: 2022-12-28
Payer: MEDICAID

## 2022-12-28 VITALS
HEIGHT: 65 IN | WEIGHT: 280 LBS | SYSTOLIC BLOOD PRESSURE: 129 MMHG | BODY MASS INDEX: 46.65 KG/M2 | OXYGEN SATURATION: 98 % | RESPIRATION RATE: 20 BRPM | HEART RATE: 77 BPM | DIASTOLIC BLOOD PRESSURE: 78 MMHG

## 2022-12-28 DIAGNOSIS — F31.9 BIPOLAR 1 DISORDER (HCC): ICD-10-CM

## 2022-12-28 DIAGNOSIS — N92.6 IRREGULAR MENSES: Primary | ICD-10-CM

## 2022-12-28 DIAGNOSIS — I10 ESSENTIAL HYPERTENSION: ICD-10-CM

## 2022-12-28 DIAGNOSIS — E66.01 OBESITY, MORBID (HCC): ICD-10-CM

## 2022-12-28 DIAGNOSIS — N95.1 MENOPAUSAL SYMPTOMS: ICD-10-CM

## 2022-12-28 DIAGNOSIS — E78.5 HYPERLIPIDEMIA, UNSPECIFIED HYPERLIPIDEMIA TYPE: ICD-10-CM

## 2022-12-28 PROCEDURE — 3078F DIAST BP <80 MM HG: CPT | Performed by: FAMILY MEDICINE

## 2022-12-28 PROCEDURE — 3074F SYST BP LT 130 MM HG: CPT | Performed by: FAMILY MEDICINE

## 2022-12-28 PROCEDURE — 99214 OFFICE O/P EST MOD 30 MIN: CPT | Performed by: FAMILY MEDICINE

## 2022-12-28 NOTE — PROGRESS NOTES
1. \"Have you been to the ER, urgent care clinic since your last visit? Hospitalized since your last visit? \" No    2. \"Have you seen or consulted any other health care providers outside of the [address] [place] [address] since your last visit? \" No     3. For patients aged 39-70: Has the patient had a colonoscopy / FIT/ Cologuard? NA - based on age      If the patient is female:    4. For patients aged 41-77: Has the patient had a mammogram within the past 2 years? Yes - no Care Gap present      5. For patients aged 21-65: Has the patient had a pap smear?  Yes - no Care Gap present  N

## 2022-12-28 NOTE — PROGRESS NOTES
IVIS Melendez comes in for follow up care. Irregular menses: Patient hasirregular menses. Pregnancy test is negative. She has been having menopausal symptoms also with hot flashes. Suspect irregular menses are due to perimenopausal symptoms. We discussed management options. I will check her hormones. We will check FSH and LH. I will refer her to see the gynecologist.  HTN: Patient has hypertension. Blood pressure is stable. She is on HCTZ and labetalol. Denies headache, changes in vision or focal weakness. We will continue current treatment plan. She will take a low-sodium diet. Dyslipidemia: Patient has dyslipidemia. She is on Lipitor. Stable on medication. She will continue to exercise and take a diet low in polysaturated fats. Mood disorder: Patient has mood disorder with bipolar disorder. She is on aripiprazole. She is followed up by the behavioral health specialist.  She has been stable on medication. Continue current treatment plan. Morbid obesity: Patient has morbid obesity. BMI is 46.59. She will intensify lifestyle and dietary modification. Past Medical History  Past Medical History:   Diagnosis Date    Bipolar 1 disorder ([facility] [address]     Borderline diabetes     Genital herpes     Hyperlipidemia     Hypertension        Surgical History  Past Surgical History:   Procedure Laterality Date    COLONOSCOPY N/A 7/27/2022    COLONOSCOPY/ Polypectomy performed by Lissa Flower MD at [facility]        Medications  Current Outpatient Medications   Medication Sig Dispense Refill    labetaloL (NORMODYNE) 300 mg tablet Take 300 mg by mouth two (2) times a day. hydroCHLOROthiazide (HYDRODIURIL) 25 mg tablet take 1 tablet by mouth once daily 90 Tablet 0    atorvastatin (LIPITOR) 10 mg tablet Take 1 Tablet by mouth daily. 90 Tablet 1    loratadine (CLARITIN) 10 mg tablet Take 1 Tablet by mouth daily as needed for Allergies.  90 Tablet 0    fluticasone propionate (FLONASE) 50 mcg/actuation nasal spray 2 Sprays by Both Nostrils route daily. 1 Each 0    MULTIVITAMIN PO Take  by mouth. ARIPiprazole (ABILIFY) 15 mg tablet Take 20 mg by mouth daily. Allergies  No Known Allergies    Family History  Family History   Problem Relation Age of Onset    Hypertension Mother     Heart Disease Father     Heart Surgery Father     Hypertension Father     Anemia Sister     Asthma Sister     Diabetes Brother     Diabetes Maternal Aunt        Social History  Social History     Socioeconomic History    Marital status: SINGLE     Spouse name: Not on file    Number of children: Not on file    Years of education: Not on file    Highest education level: Not on file   Occupational History    Not on file   Tobacco Use    Smoking status: Never    Smokeless tobacco: Never   Vaping Use    Vaping Use: Never used   Substance and Sexual Activity    Alcohol use: No     Alcohol/week: 0.0 standard drinks    Drug use: No    Sexual activity: Yes     Partners: Male     Birth control/protection: None   Other Topics Concern     Service No    Blood Transfusions No    Caffeine Concern Yes     Comment: ocassionally     Occupational Exposure Not Asked    Hobby Hazards Not Asked    Sleep Concern No    Stress Concern No    Weight Concern Yes    Special Diet No    Back Care Not Asked    Exercise No    Bike Helmet Not Asked    Seat Belt Yes    Self-Exams Yes   Social History Narrative    Not on file     Social Determinants of Health     Financial Resource Strain: Not on file   Food Insecurity: Not on file   Transportation Needs: Not on file   Physical Activity: Not on file   Stress: Not on file   Social Connections: Not on file   Intimate Partner Violence: Not on file   Housing Stability: Not on file       Review of Systems  Review of Systems - Review of all systems is negative except as noted above in the HPI.     Vital Signs  Visit Vitals  /78 (BP 1 Location: Right upper arm, BP Patient Position: Sitting, BP Cuff Size: Large adult)   Pulse 77   Resp 20   Ht 5' 5\" (1.651 m)   Wt 280 lb (127 kg)   LMP 11/22/2022   SpO2 98%   BMI 46.59 kg/m²         Physical Exam  Physical Examination: General appearance - alert, well appearing, and in no distress, oriented to person, place, and time, and overweight  Mental status - alert, oriented to person, place, and time  Lymphatics - no palpable lymphadenopathy, no hepatosplenomegaly  Chest - clear to auscultation, no wheezes, rales or rhonchi, symmetric air entry  Heart - S1 and S2 normal  Abdomen - no rebound tenderness noted  Back exam - limited range of motion  Neurological - abnormal neurological exam unchanged from prior examinations  Musculoskeletal - osteoarthritic changes noted in both hands  Extremities - intact peripheral pulses      Results  Results for orders placed or performed in visit on 11/10/22   RETICULOCYTE COUNT   Result Value Ref Range    Reticulocyte count 1.6 0.5 - 2.0 %    Absolute Retic Cnt. 0.0670 0.0300 - 0.1 M/uL    RETIC HEMOGLOBIN 24.5 (L) 28.2 - 38.2 pg   CBC WITH AUTOMATED DIFF   Result Value Ref Range    WBC 9.6 4.0 - 11.0 K/uL    RBC 4.32 3.80 - 5.20 M/uL    HGB 9.7 (L) 11.7 - 16.0 g/dL    HCT 32.8 (L) 35.1 - 48.0 %    MCV 76 (L) 80 - 99 fL    MCH 23 (L) 26 - 34 pg    MCHC 30 (L) 31 - 36 g/dL    RDW 16.4 (H) 10.0 - 15.5 %    PLATELET 082 (H) 318 - 440 K/uL    MPV 9.5 9.0 - 13.0 fL    NEUTROPHILS 54 40 - 75 %    Lymphocytes 36 20 - 45 %    MONOCYTES 8 3 - 12 %    EOSINOPHILS 2 0 - 6 %    BASOPHILS 1 0 - 2 %    ABS. NEUTROPHILS 5.2 1.8 - 7.7 K/uL    ABSOLUTE LYMPHOCYTE COUNT 3.5 1.0 - 4.8 K/uL    ABS. MONOCYTES 0.7 0.1 - 1.0 K/uL    ABS. EOSINOPHILS 0.2 0.0 - 0.5 K/uL    ABS.  BASOPHILS 0.1 0.0 - 0.2 K/uL   FERRITIN   Result Value Ref Range    Ferritin 15 10 - 291 ng/mL   IRON PROFILE   Result Value Ref Range    Iron 43 30 - 160 mcg/dL    UIBC 348 110 - 370 mcg/dL    TIBC 391 228 - 428 mcg/dL    Iron % saturation 11 (L) 20 - 50 %   METABOLIC PANEL, COMPREHENSIVE   Result Value Ref Range    Glucose 109 (H) 70 - 99 mg/dL    BUN 11 6 - 22 mg/dL    Creatinine 0.8 0.5 - 1.2 mg/dL    Sodium 138 133 - 145 mmol/L    Potassium 3.6 3.5 - 5.5 mmol/L    Chloride 97 (L) 98 - 110 mmol/L    CO2 32 20 - 32 mmol/L    AST (SGOT) 14 10 - 37 U/L    ALT (SGPT) 15 5 - 40 U/L    Alk. phosphatase 98 25 - 115 U/L    Bilirubin, total 0.8 0.2 - 1.2 mg/dL    Calcium 9.6 8.4 - 10.5 mg/dL    Protein, total 7.6 6.4 - 8.3 g/dL    Albumin 4.6 3.5 - 5.0 g/dL    A-G Ratio 1.5 1.1 - 2.6 ratio    Globulin 3.0 2.0 - 4.0 g/dL    GLOMERULAR FILTRATION RATE >60.0 >60.0 mL/min/1.73 sq.m. Anion gap 9.0 3.0 - 15.0 mmol/L       ASSESSMENT and PLAN    ICD-10-CM ICD-9-CM    1. Irregular menses  N92.6 626.4 FSH AND LH      REFERRAL TO GYNECOLOGY      2. Menopausal symptoms  N95.1 627.2 FSH AND LH      REFERRAL TO GYNECOLOGY      3. Essential hypertension  I10 401.9       4. Bipolar 1 disorder (HCC)  F31.9 296.7       5. Hyperlipidemia, unspecified hyperlipidemia type  E78.5 272.4       6. [facility] ([facility]  E66.01 278.01         lab results and schedule of future lab studies reviewed with patient  reviewed diet, exercise and weight control  cardiovascular risk and specific lipid/LDL goals reviewed  reviewed medications and side effects in detail      I have discussed the diagnosis with the patient and the intended plan of care as seen in the above orders. The patient has received an after-visit summary and questions were answered concerning future plans. I have discussed medication, side effects, and warnings with the patient in detail. The patient verbalized understanding and is in agreement with the plan of care. The patient will contact the office with any additional concerns. Jong Fields MD    PLEASE NOTE:   This document has been produced using voice recognition software.  Unrecognized errors in transcription may be present

## 2022-12-29 ENCOUNTER — HOSPITAL ENCOUNTER (OUTPATIENT)
Dept: INFUSION THERAPY | Age: 47
End: 2022-12-29
Payer: MEDICAID

## 2022-12-29 ENCOUNTER — HOSPITAL ENCOUNTER (OUTPATIENT)
Dept: LAB | Age: 47
Discharge: HOME | End: 2022-12-29

## 2022-12-29 VITALS
DIASTOLIC BLOOD PRESSURE: 75 MMHG | HEART RATE: 75 BPM | TEMPERATURE: 97 F | OXYGEN SATURATION: 97 % | RESPIRATION RATE: 20 BRPM | SYSTOLIC BLOOD PRESSURE: 111 MMHG

## 2022-12-29 DIAGNOSIS — D50.8 OTHER IRON DEFICIENCY ANEMIA: Primary | ICD-10-CM

## 2022-12-29 LAB — SENTARA SPECIMEN COL,SENBCF: NORMAL

## 2022-12-29 PROCEDURE — 74011250636 HC RX REV CODE- 250/636: Performed by: INTERNAL MEDICINE

## 2022-12-29 PROCEDURE — 74011000250 HC RX REV CODE- 250: Performed by: INTERNAL MEDICINE

## 2022-12-29 PROCEDURE — 99001 SPECIMEN HANDLING PT-LAB: CPT

## 2022-12-29 PROCEDURE — 96365 THER/PROPH/DIAG IV INF INIT: CPT

## 2022-12-29 RX ORDER — DIPHENHYDRAMINE HYDROCHLORIDE 50 MG/ML
25 INJECTION, SOLUTION INTRAMUSCULAR; INTRAVENOUS AS NEEDED
Start: 2022-12-29

## 2022-12-29 RX ORDER — SODIUM CHLORIDE 9 MG/ML
5-250 INJECTION, SOLUTION INTRAVENOUS AS NEEDED
OUTPATIENT
Start: 2022-12-29

## 2022-12-29 RX ORDER — ALBUTEROL SULFATE 0.83 MG/ML
2.5 SOLUTION RESPIRATORY (INHALATION) AS NEEDED
Start: 2022-12-29

## 2022-12-29 RX ORDER — EPINEPHRINE 1 MG/ML
0.3 INJECTION, SOLUTION, CONCENTRATE INTRAVENOUS AS NEEDED
OUTPATIENT
Start: 2022-12-29

## 2022-12-29 RX ORDER — ACETAMINOPHEN 325 MG/1
650 TABLET ORAL AS NEEDED
Start: 2022-12-29

## 2022-12-29 RX ORDER — DIPHENHYDRAMINE HYDROCHLORIDE 50 MG/ML
50 INJECTION, SOLUTION INTRAMUSCULAR; INTRAVENOUS AS NEEDED
Start: 2022-12-29

## 2022-12-29 RX ORDER — SODIUM CHLORIDE 9 MG/ML
5-250 INJECTION, SOLUTION INTRAVENOUS AS NEEDED
Status: CANCELLED | OUTPATIENT
Start: 2022-12-29

## 2022-12-29 RX ORDER — SODIUM CHLORIDE 9 MG/ML
5-40 INJECTION INTRAMUSCULAR; INTRAVENOUS; SUBCUTANEOUS AS NEEDED
OUTPATIENT
Start: 2022-12-29

## 2022-12-29 RX ORDER — SODIUM CHLORIDE 0.9 % (FLUSH) 0.9 %
5-40 SYRINGE (ML) INJECTION AS NEEDED
Status: DISPENSED | OUTPATIENT
Start: 2022-12-29 | End: 2022-12-29

## 2022-12-29 RX ORDER — HYDROCORTISONE SODIUM SUCCINATE 100 MG/2ML
100 INJECTION, POWDER, FOR SOLUTION INTRAMUSCULAR; INTRAVENOUS AS NEEDED
OUTPATIENT
Start: 2022-12-29

## 2022-12-29 RX ORDER — SODIUM CHLORIDE 0.9 % (FLUSH) 0.9 %
5-40 SYRINGE (ML) INJECTION AS NEEDED
OUTPATIENT
Start: 2022-12-29

## 2022-12-29 RX ORDER — HEPARIN 100 UNIT/ML
500 SYRINGE INTRAVENOUS AS NEEDED
Start: 2022-12-29

## 2022-12-29 RX ORDER — ONDANSETRON 2 MG/ML
8 INJECTION INTRAMUSCULAR; INTRAVENOUS AS NEEDED
OUTPATIENT
Start: 2022-12-29

## 2022-12-29 RX ORDER — SODIUM CHLORIDE 9 MG/ML
5-250 INJECTION, SOLUTION INTRAVENOUS AS NEEDED
Status: DISPENSED | OUTPATIENT
Start: 2022-12-29 | End: 2022-12-29

## 2022-12-29 RX ADMIN — SODIUM CHLORIDE, PRESERVATIVE FREE 10 ML: 5 INJECTION INTRAVENOUS at 11:19

## 2022-12-29 RX ADMIN — FERRIC CARBOXYMALTOSE INJECTION 750 MG: 50 INJECTION, SOLUTION INTRAVENOUS at 11:28

## 2022-12-29 RX ADMIN — SODIUM CHLORIDE 25 ML/HR: 9 INJECTION, SOLUTION INTRAVENOUS at 11:28

## 2022-12-29 RX ADMIN — SODIUM CHLORIDE, PRESERVATIVE FREE 10 ML: 5 INJECTION INTRAVENOUS at 11:52

## 2022-12-29 NOTE — PROGRESS NOTES
[facility] Progress Note    Date: 2022    Name: Abby Garcia    MRN: [id number]         : 1975    [facility] #2 OF 2    Ms. Maurizio Renee was assessed and education was provided. Pt reports she tolerated her last Injectafer infusion well. Ms. Eula Sher vitals were reviewed and patient was observed for 5 minutes prior to treatment. Visit Vitals  /75 (BP 1 Location: Right upper arm, BP Patient Position: Sitting)   Pulse 75   Temp 97 °F (36.1 °C)   Resp 20   SpO2 97%     #22 PIV started in right antecubital x1 attempt; brisk blood return and flushed with ease. Ferric Carboxymaltose 750mg mixed in 250cc NS infused over 20 minutes. PIV flushed with NS and removed, 2x2 and coban applied. Ms. Maurizio Renee tolerated the infusion, and had no complaints. Patient armband removed and shredded. Discharge instructions verbalized to patient and all questions answered. Ms. Maurizio Renee was discharged from [place] [address] in stable condition at 1200.  She is to follow up with Dr. Briana Belcher, RN  2022

## 2022-12-30 LAB
FSH SERPL-ACNC: 8.1 MIU/ML
LH SERPL-ACNC: 36.7 MIU/ML

## 2023-01-03 RX ORDER — HYDROCHLOROTHIAZIDE 25 MG/1
TABLET ORAL
Qty: 90 TABLET | Refills: 0 | Status: SHIPPED | OUTPATIENT
Start: 2023-01-03

## 2023-01-12 ENCOUNTER — TELEPHONE (OUTPATIENT)
Dept: FAMILY MEDICINE CLINIC | Age: 48
End: 2023-01-12

## 2023-01-12 NOTE — TELEPHONE ENCOUNTER
Ms See Nellie is asking if she should keep her appointment with the OBGYN regarding her irregular periods because she is currently on her period now. Her appointment is Monday 01/16.  Please follow up with Ms See Nellie to advise when available

## 2023-03-06 NOTE — TELEPHONE ENCOUNTER
Refill Request:  Labetalol 300 mg tablet  Claritin 20 ng tablet    [address] [place] [address]  [place], [place].

## 2023-03-07 RX ORDER — LORATADINE 10 MG/1
10 TABLET ORAL DAILY PRN
Status: CANCELLED | OUTPATIENT
Start: 2023-03-07

## 2023-03-08 RX ORDER — LORATADINE 10 MG/1
10 TABLET ORAL DAILY PRN
Qty: 90 TABLET | Refills: 1 | Status: SHIPPED | OUTPATIENT
Start: 2023-03-08

## 2023-03-08 RX ORDER — LABETALOL 300 MG/1
300 TABLET, FILM COATED ORAL 2 TIMES DAILY
Qty: 180 TABLET | Refills: 1 | Status: SHIPPED | OUTPATIENT
Start: 2023-03-08

## 2023-03-29 ENCOUNTER — HOSPITAL ENCOUNTER (OUTPATIENT)
Facility: HOSPITAL | Age: 48
Discharge: HOME | End: 2023-04-01

## 2023-03-29 DIAGNOSIS — D75.839 THROMBOCYTOSIS, UNSPECIFIED: ICD-10-CM

## 2023-03-29 DIAGNOSIS — D50.9 IRON DEFICIENCY ANEMIA, UNSPECIFIED IRON DEFICIENCY ANEMIA TYPE: ICD-10-CM

## 2023-03-29 DIAGNOSIS — D50.0 IRON DEFICIENCY ANEMIA SECONDARY TO BLOOD LOSS (CHRONIC): Primary | ICD-10-CM

## 2023-03-29 LAB — SENTARA SPECIMEN COLLECTION: NORMAL

## 2023-03-29 PROCEDURE — 99001 SPECIMEN HANDLING PT-LAB: CPT

## 2023-03-30 LAB
A/G RATIO: 1.4 RATIO (ref 1.1–2.6)
ALBUMIN SERPL-MCNC: 4.3 G/DL (ref 3.5–5)
ALP BLD-CCNC: 88 U/L (ref 25–115)
ALT SERPL-CCNC: 14 U/L (ref 5–40)
ANION GAP SERPL CALCULATED.3IONS-SCNC: 13 MMOL/L (ref 3–15)
AST SERPL-CCNC: 16 U/L (ref 10–37)
BASOPHILS # BLD: 0 % (ref 0–2)
BASOPHILS ABSOLUTE: 0 K/UL (ref 0–0.2)
BILIRUB SERPL-MCNC: 0.6 MG/DL (ref 0.2–1.2)
BUN BLDV-MCNC: 9 MG/DL (ref 6–22)
CALCIUM SERPL-MCNC: 9.6 MG/DL (ref 8.4–10.5)
CHLORIDE BLD-SCNC: 97 MMOL/L (ref 98–110)
CO2: 28 MMOL/L (ref 20–32)
CREAT SERPL-MCNC: 0.7 MG/DL (ref 0.5–1.2)
EOSINOPHIL # BLD: 3 % (ref 0–6)
EOSINOPHILS ABSOLUTE: 0.2 K/UL (ref 0–0.5)
FERRITIN: 169 NG/ML (ref 10–291)
GLOBULIN: 3.1 G/DL (ref 2–4)
GLOMERULAR FILTRATION RATE: >60 ML/MIN/1.73 SQ.M.
GLUCOSE: 133 MG/DL (ref 70–99)
HCT VFR BLD CALC: 38.4 % (ref 35.1–48)
HEMOGLOBIN: 11.9 G/DL (ref 11.7–16)
IRON % SATURATION: 17 % (ref 20–50)
IRON: 54 MCG/DL (ref 30–160)
LYMPHOCYTES # BLD: 38 % (ref 20–45)
LYMPHOCYTES ABSOLUTE: 2.8 K/UL (ref 1–4.8)
MCH RBC QN AUTO: 26 PG (ref 26–34)
MCHC RBC AUTO-ENTMCNC: 31 G/DL (ref 31–36)
MCV RBC AUTO: 82 FL (ref 80–99)
MONOCYTES ABSOLUTE: 0.4 K/UL (ref 0.1–1)
MONOCYTES: 5 % (ref 3–12)
NEUTROPHILS ABSOLUTE: 4 K/UL (ref 1.8–7.7)
NEUTROPHILS: 54 % (ref 40–75)
PDW BLD-RTO: 14.4 % (ref 10–15.5)
PLATELET # BLD: 429 K/UL (ref 140–440)
PMV BLD AUTO: 9.2 FL (ref 9–13)
POTASSIUM SERPL-SCNC: 3.4 MMOL/L (ref 3.5–5.5)
RBC: 4.66 M/UL (ref 3.8–5.2)
RETIC HEMOGLOBIN: 30 PG (ref 28.2–38.2)
RETICULOCYTE ABSOLUTE COUNT: 0.08 M/UL (ref 0.03–0.1)
RETICULOCYTE COUNT: 1.7 % (ref 0.5–2)
SODIUM BLD-SCNC: 138 MMOL/L (ref 133–145)
TOTAL IRON BINDING CAPACITY: 310 MCG/DL (ref 228–428)
TOTAL PROTEIN: 7.4 G/DL (ref 6.4–8.3)
UIBC: 256 MCG/DL (ref 110–370)
WBC: 7.4 K/UL (ref 4–11)

## 2023-04-03 RX ORDER — HYDROCHLOROTHIAZIDE 25 MG/1
TABLET ORAL
Qty: 90 TABLET | Refills: 1 | Status: SHIPPED | OUTPATIENT
Start: 2023-04-03

## 2023-04-03 RX ORDER — ATORVASTATIN CALCIUM 10 MG/1
TABLET, FILM COATED ORAL
Qty: 90 TABLET | Refills: 1 | Status: SHIPPED | OUTPATIENT
Start: 2023-04-03

## 2023-04-05 RX ORDER — HYDROCHLOROTHIAZIDE 25 MG/1
TABLET ORAL
Qty: 90 TABLET | Refills: 1 | OUTPATIENT
Start: 2023-04-05

## 2023-04-28 ENCOUNTER — TELEPHONE (OUTPATIENT)
Facility: CLINIC | Age: 48
End: 2023-04-28

## 2023-04-28 NOTE — TELEPHONE ENCOUNTER
Spoke with patient and advised there were no orders written, if he wants to request them he will at the time of her next appt in august.  Patient verbalized understanding    ----- Message from [facility] - INPATIENT sent at 4/28/2023 10:05 AM EDT -----  Subject: Message to Provider    QUESTIONS  Information for Provider? pt wants to know if she needs blood work prior   to her appt on 8/10/2023 3:45 PM  ---------------------------------------------------------------------------  --------------  [id number]  [phone]; OK to leave message on voicemail  ---------------------------------------------------------------------------  --------------  SCRIPT ANSWERS  Relationship to Patient?  Self

## 2023-05-04 ENCOUNTER — OFFICE VISIT (OUTPATIENT)
Facility: CLINIC | Age: 48
End: 2023-05-04
Payer: MEDICAID

## 2023-05-04 VITALS
TEMPERATURE: 98.1 F | SYSTOLIC BLOOD PRESSURE: 119 MMHG | BODY MASS INDEX: 45.32 KG/M2 | HEIGHT: 65 IN | RESPIRATION RATE: 18 BRPM | DIASTOLIC BLOOD PRESSURE: 65 MMHG | WEIGHT: 272 LBS | OXYGEN SATURATION: 96 % | HEART RATE: 74 BPM

## 2023-05-04 DIAGNOSIS — H66.90 ACUTE OTITIS MEDIA, UNSPECIFIED OTITIS MEDIA TYPE: Primary | ICD-10-CM

## 2023-05-04 DIAGNOSIS — F31.9 BIPOLAR AFFECTIVE DISORDER, REMISSION STATUS UNSPECIFIED (HCC): ICD-10-CM

## 2023-05-04 DIAGNOSIS — E66.01 MORBID (SEVERE) OBESITY DUE TO EXCESS CALORIES (HCC): ICD-10-CM

## 2023-05-04 DIAGNOSIS — J30.9 ALLERGIC RHINITIS, UNSPECIFIED SEASONALITY, UNSPECIFIED TRIGGER: ICD-10-CM

## 2023-05-04 DIAGNOSIS — I10 ESSENTIAL (PRIMARY) HYPERTENSION: ICD-10-CM

## 2023-05-04 DIAGNOSIS — E78.5 HYPERLIPIDEMIA, UNSPECIFIED HYPERLIPIDEMIA TYPE: ICD-10-CM

## 2023-05-04 PROCEDURE — 3074F SYST BP LT 130 MM HG: CPT | Performed by: FAMILY MEDICINE

## 2023-05-04 PROCEDURE — 3078F DIAST BP <80 MM HG: CPT | Performed by: FAMILY MEDICINE

## 2023-05-04 PROCEDURE — 99214 OFFICE O/P EST MOD 30 MIN: CPT | Performed by: FAMILY MEDICINE

## 2023-05-04 RX ORDER — AMOXICILLIN AND CLAVULANATE POTASSIUM 875; 125 MG/1; MG/1
1 TABLET, FILM COATED ORAL 2 TIMES DAILY
Qty: 20 TABLET | Refills: 0 | Status: SHIPPED | OUTPATIENT
Start: 2023-05-04 | End: 2023-05-14

## 2023-05-04 RX ORDER — LORATADINE 10 MG/1
10 TABLET ORAL DAILY PRN
Qty: 90 TABLET | Refills: 1 | Status: SHIPPED | OUTPATIENT
Start: 2023-05-04

## 2023-05-04 SDOH — ECONOMIC STABILITY: FOOD INSECURITY: WITHIN THE PAST 12 MONTHS, THE FOOD YOU BOUGHT JUST DIDN'T LAST AND YOU DIDN'T HAVE MONEY TO GET MORE.: NEVER TRUE

## 2023-05-04 SDOH — ECONOMIC STABILITY: HOUSING INSECURITY
IN THE LAST 12 MONTHS, WAS THERE A TIME WHEN YOU DID NOT HAVE A STEADY PLACE TO SLEEP OR SLEPT IN A SHELTER (INCLUDING NOW)?: NO

## 2023-05-04 SDOH — ECONOMIC STABILITY: FOOD INSECURITY: WITHIN THE PAST 12 MONTHS, YOU WORRIED THAT YOUR FOOD WOULD RUN OUT BEFORE YOU GOT MONEY TO BUY MORE.: NEVER TRUE

## 2023-05-04 SDOH — ECONOMIC STABILITY: INCOME INSECURITY: HOW HARD IS IT FOR YOU TO PAY FOR THE VERY BASICS LIKE FOOD, HOUSING, MEDICAL CARE, AND HEATING?: NOT VERY HARD

## 2023-05-04 ASSESSMENT — PATIENT HEALTH QUESTIONNAIRE - PHQ9
7. TROUBLE CONCENTRATING ON THINGS, SUCH AS READING THE NEWSPAPER OR WATCHING TELEVISION: 0
SUM OF ALL RESPONSES TO PHQ QUESTIONS 1-9: 1
SUM OF ALL RESPONSES TO PHQ QUESTIONS 1-9: 1
2. FEELING DOWN, DEPRESSED OR HOPELESS: 0
5. POOR APPETITE OR OVEREATING: 0
8. MOVING OR SPEAKING SO SLOWLY THAT OTHER PEOPLE COULD HAVE NOTICED. OR THE OPPOSITE, BEING SO FIGETY OR RESTLESS THAT YOU HAVE BEEN MOVING AROUND A LOT MORE THAN USUAL: 0
9. THOUGHTS THAT YOU WOULD BE BETTER OFF DEAD, OR OF HURTING YOURSELF: 0
SUM OF ALL RESPONSES TO PHQ QUESTIONS 1-9: 1
1. LITTLE INTEREST OR PLEASURE IN DOING THINGS: 0
SUM OF ALL RESPONSES TO PHQ9 QUESTIONS 1 & 2: 0
4. FEELING TIRED OR HAVING LITTLE ENERGY: 1
10. IF YOU CHECKED OFF ANY PROBLEMS, HOW DIFFICULT HAVE THESE PROBLEMS MADE IT FOR YOU TO DO YOUR WORK, TAKE CARE OF THINGS AT HOME, OR GET ALONG WITH OTHER PEOPLE: 0
3. TROUBLE FALLING OR STAYING ASLEEP: 0
6. FEELING BAD ABOUT YOURSELF - OR THAT YOU ARE A FAILURE OR HAVE LET YOURSELF OR YOUR FAMILY DOWN: 0
SUM OF ALL RESPONSES TO PHQ QUESTIONS 1-9: 1

## 2023-05-04 NOTE — PROGRESS NOTES
1. \"Have you been to the ER, urgent care clinic since your last visit? Hospitalized since your last visit? \"No    2. \"Have you seen or consulted any other health care providers outside of the [address] [place] [address] since your last visit? \" No    3. For patients aged 39-70: Has the patient had a colonoscopy / FIT/ Cologuard? Yes      If the patient is female:    4. For patients aged 41-77: Has the patient had a mammogram within the past 2 years? Yes      5. For patients aged 21-65: Has the patient had a pap smear?  Yes

## 2023-05-04 NOTE — PROGRESS NOTES
[facility]  Johana Mt comes in for follow-up care. Ear pain: Patient has right ear pain. This has been ongoing for about a week. No discharge from the ear. Right ear is hyperemic. This is acute otitis media. I will send in Augmentin to take. Allergy: Patient has allergy with nasal congestion and sneezing. She has tried over-the-counter medication without much relief. She also has tearing both eyes. This is due to the pollen in the environment. I will send in Claritin to take. Morbid obesity: Patient is a BMI of 45.26. This is morbid obesity. She would like to lose weight. She is doing lifestyle and dietary modification. Has been in a weight loss clinic. Semaglutide was prescribed but insurance company does not pay for this. I will refer her to the medical weight loss clinic for follow-up care. She will intensify lifestyle and dietary modification. Hypertension: Patient has hypertension. Blood pressure is stable. She is on hydrochlorothiazide and labetalol. Stable on medication. Continue current treatment. Mood disorder: Patient has bipolar disorder. She is on Abilify. Stable medication. Continue current treatment plan. Dyslipidemia: Patient is dyslipidemia. She takes atorvastatin. Stable medication. She will continue to exercise and take a diet low in polysaturated fats. Prediabetes: Patient has prediabetes. Last HbA1c was 6.1. We will recheck at next visit. She will intensify lifestyle and dietary modification. .      Past Medical History  Past Medical History:   Diagnosis Date    Bipolar 1 disorder ([facility] [address]     Borderline diabetes     Genital herpes     Hyperlipidemia     Hypertension        Surgical History  Past Surgical History:   Procedure Laterality Date     SECTION      COLONOSCOPY N/A 2022    COLONOSCOPY/ Polypectomy performed by Tan Avelar MD at [facility]        Medications  Current Outpatient Medications   Medication Sig Dispense Refill

## 2023-06-28 ENCOUNTER — HOSPITAL ENCOUNTER (OUTPATIENT)
Facility: HOSPITAL | Age: 48
Discharge: HOME | End: 2023-07-01

## 2023-06-28 ENCOUNTER — HOSPITAL ENCOUNTER (OUTPATIENT)
Facility: HOSPITAL | Age: 48
Discharge: HOME | End: 2023-07-01
Payer: MEDICAID

## 2023-06-28 DIAGNOSIS — D50.0 IRON DEFICIENCY ANEMIA SECONDARY TO BLOOD LOSS (CHRONIC): Primary | ICD-10-CM

## 2023-06-28 DIAGNOSIS — D75.839 THROMBOCYTOSIS, UNSPECIFIED: ICD-10-CM

## 2023-06-28 DIAGNOSIS — D50.9 IRON DEFICIENCY ANEMIA, UNSPECIFIED IRON DEFICIENCY ANEMIA TYPE: ICD-10-CM

## 2023-06-28 DIAGNOSIS — Z12.31 SCREENING MAMMOGRAM FOR HIGH-RISK PATIENT: ICD-10-CM

## 2023-06-28 LAB — SENTARA SPECIMEN COLLECTION: NORMAL

## 2023-06-28 PROCEDURE — 77067 SCR MAMMO BI INCL CAD: CPT

## 2023-07-25 ENCOUNTER — TELEMEDICINE (OUTPATIENT)
Facility: CLINIC | Age: 48
End: 2023-07-25
Payer: MEDICAID

## 2023-07-25 DIAGNOSIS — R09.81 SINUS CONGESTION: Primary | ICD-10-CM

## 2023-07-25 DIAGNOSIS — J30.9 ALLERGIC RHINITIS, UNSPECIFIED SEASONALITY, UNSPECIFIED TRIGGER: ICD-10-CM

## 2023-07-25 DIAGNOSIS — D50.9 IRON DEFICIENCY ANEMIA, UNSPECIFIED IRON DEFICIENCY ANEMIA TYPE: ICD-10-CM

## 2023-07-25 DIAGNOSIS — E78.5 HYPERLIPIDEMIA, UNSPECIFIED HYPERLIPIDEMIA TYPE: ICD-10-CM

## 2023-07-25 DIAGNOSIS — R19.7 DIARRHEA, UNSPECIFIED TYPE: ICD-10-CM

## 2023-07-25 DIAGNOSIS — F31.9 BIPOLAR AFFECTIVE DISORDER, REMISSION STATUS UNSPECIFIED (HCC): ICD-10-CM

## 2023-07-25 DIAGNOSIS — I10 ESSENTIAL (PRIMARY) HYPERTENSION: ICD-10-CM

## 2023-07-25 PROCEDURE — 99214 OFFICE O/P EST MOD 30 MIN: CPT | Performed by: FAMILY MEDICINE

## 2023-07-25 RX ORDER — CETIRIZINE HYDROCHLORIDE 10 MG/1
10 TABLET ORAL DAILY PRN
Qty: 90 TABLET | Refills: 1 | Status: SHIPPED | OUTPATIENT
Start: 2023-07-25

## 2023-07-25 RX ORDER — FLUTICASONE PROPIONATE 50 MCG
2 SPRAY, SUSPENSION (ML) NASAL DAILY
Qty: 16 G | Refills: 0 | Status: SHIPPED | OUTPATIENT
Start: 2023-07-25

## 2023-07-25 SDOH — ECONOMIC STABILITY: FOOD INSECURITY: WITHIN THE PAST 12 MONTHS, THE FOOD YOU BOUGHT JUST DIDN'T LAST AND YOU DIDN'T HAVE MONEY TO GET MORE.: NEVER TRUE

## 2023-07-25 SDOH — ECONOMIC STABILITY: FOOD INSECURITY: WITHIN THE PAST 12 MONTHS, YOU WORRIED THAT YOUR FOOD WOULD RUN OUT BEFORE YOU GOT MONEY TO BUY MORE.: NEVER TRUE

## 2023-07-25 SDOH — ECONOMIC STABILITY: INCOME INSECURITY: HOW HARD IS IT FOR YOU TO PAY FOR THE VERY BASICS LIKE FOOD, HOUSING, MEDICAL CARE, AND HEATING?: NOT HARD AT ALL

## 2023-07-25 ASSESSMENT — PATIENT HEALTH QUESTIONNAIRE - PHQ9
2. FEELING DOWN, DEPRESSED OR HOPELESS: 0
9. THOUGHTS THAT YOU WOULD BE BETTER OFF DEAD, OR OF HURTING YOURSELF: 0
7. TROUBLE CONCENTRATING ON THINGS, SUCH AS READING THE NEWSPAPER OR WATCHING TELEVISION: 0
SUM OF ALL RESPONSES TO PHQ QUESTIONS 1-9: 0
3. TROUBLE FALLING OR STAYING ASLEEP: 0
6. FEELING BAD ABOUT YOURSELF - OR THAT YOU ARE A FAILURE OR HAVE LET YOURSELF OR YOUR FAMILY DOWN: 0
SUM OF ALL RESPONSES TO PHQ QUESTIONS 1-9: 0
5. POOR APPETITE OR OVEREATING: 0
4. FEELING TIRED OR HAVING LITTLE ENERGY: 0
1. LITTLE INTEREST OR PLEASURE IN DOING THINGS: 0
SUM OF ALL RESPONSES TO PHQ QUESTIONS 1-9: 0
8. MOVING OR SPEAKING SO SLOWLY THAT OTHER PEOPLE COULD HAVE NOTICED. OR THE OPPOSITE, BEING SO FIGETY OR RESTLESS THAT YOU HAVE BEEN MOVING AROUND A LOT MORE THAN USUAL: 0
SUM OF ALL RESPONSES TO PHQ9 QUESTIONS 1 & 2: 0
10. IF YOU CHECKED OFF ANY PROBLEMS, HOW DIFFICULT HAVE THESE PROBLEMS MADE IT FOR YOU TO DO YOUR WORK, TAKE CARE OF THINGS AT HOME, OR GET ALONG WITH OTHER PEOPLE: 0
SUM OF ALL RESPONSES TO PHQ QUESTIONS 1-9: 0

## 2023-07-25 NOTE — PROGRESS NOTES
1. \"Have you been to the ER, urgent care clinic since your last visit? Hospitalized since your last visit? \"No    2. \"Have you seen or consulted any other health care providers outside of the [address] [place] [address] since your last visit? \" No    3. For patients aged 43-73: Has the patient had a colonoscopy / FIT/ Cologuard? Yes      If the patient is female:    4. For patients aged 43-66: Has the patient had a mammogram within the past 2 years? Yes      5. For patients aged 21-65: Has the patient had a pap smear?  Yes
electronic signature was used to authenticate this note.

## 2023-07-31 ENCOUNTER — APPOINTMENT (OUTPATIENT)
Facility: HOSPITAL | Age: 48
End: 2023-07-31
Payer: MEDICAID

## 2023-07-31 ENCOUNTER — HOSPITAL ENCOUNTER (EMERGENCY)
Facility: HOSPITAL | Age: 48
Discharge: ANOTHER HOSPITAL | End: 2023-07-31
Attending: STUDENT IN AN ORGANIZED HEALTH CARE EDUCATION/TRAINING PROGRAM
Payer: MEDICAID

## 2023-07-31 VITALS
OXYGEN SATURATION: 97 % | BODY MASS INDEX: 44.65 KG/M2 | SYSTOLIC BLOOD PRESSURE: 157 MMHG | RESPIRATION RATE: 16 BRPM | HEART RATE: 78 BPM | TEMPERATURE: 97.7 F | WEIGHT: 268 LBS | HEIGHT: 65 IN | DIASTOLIC BLOOD PRESSURE: 87 MMHG

## 2023-07-31 DIAGNOSIS — S09.90XA TRAUMATIC INJURY OF HEAD, INITIAL ENCOUNTER: ICD-10-CM
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
## 2023-07-31 PROCEDURE — 6370000000 HC RX 637 (ALT 250 FOR IP): Performed by: STUDENT IN AN ORGANIZED HEALTH CARE EDUCATION/TRAINING PROGRAM

## 2023-07-31 PROCEDURE — 70450 CT HEAD/BRAIN W/O DYE: CPT

## 2023-07-31 PROCEDURE — 2580000003 HC RX 258: Performed by: STUDENT IN AN ORGANIZED HEALTH CARE EDUCATION/TRAINING PROGRAM

## 2023-07-31 PROCEDURE — 2500000003 HC RX 250 WO HCPCS: Performed by: STUDENT IN AN ORGANIZED HEALTH CARE EDUCATION/TRAINING PROGRAM

## 2023-07-31 PROCEDURE — 72125 CT NECK SPINE W/O DYE: CPT

## 2023-07-31 PROCEDURE — 72131 CT LUMBAR SPINE W/O DYE: CPT

## 2023-07-31 PROCEDURE — 99285 EMERGENCY DEPT VISIT HI MDM: CPT

## 2023-07-31 PROCEDURE — 96375 TX/PRO/DX INJ NEW DRUG ADDON: CPT

## 2023-07-31 PROCEDURE — 6360000002 HC RX W HCPCS: Performed by: STUDENT IN AN ORGANIZED HEALTH CARE EDUCATION/TRAINING PROGRAM

## 2023-07-31 PROCEDURE — 96374 THER/PROPH/DIAG INJ IV PUSH: CPT

## 2023-07-31 RX ORDER — KETOROLAC TROMETHAMINE 30 MG/ML
30 INJECTION, SOLUTION INTRAMUSCULAR; INTRAVENOUS
Status: COMPLETED | OUTPATIENT
Start: 2023-07-31 | End: 2023-07-31

## 2023-07-31 RX ORDER — METHOCARBAMOL 750 MG/1
1500 TABLET, FILM COATED ORAL ONCE
Status: COMPLETED | OUTPATIENT
Start: 2023-07-31 | End: 2023-07-31

## 2023-07-31 RX ORDER — ACETAMINOPHEN 500 MG
1000 TABLET ORAL
Status: COMPLETED | OUTPATIENT
Start: 2023-07-31 | End: 2023-07-31

## 2023-07-31 RX ADMIN — SODIUM CHLORIDE 5 MG/HR: 9 INJECTION, SOLUTION INTRAVENOUS at 14:11

## 2023-07-31 RX ADMIN — ACETAMINOPHEN 1000 MG: 500 TABLET ORAL at 09:10

## 2023-07-31 RX ADMIN — KETOROLAC TROMETHAMINE 30 MG: 30 INJECTION, SOLUTION INTRAMUSCULAR; INTRAVENOUS at 09:10

## 2023-07-31 RX ADMIN — METHOCARBAMOL 1500 MG: 750 TABLET ORAL at 09:10

## 2023-07-31 ASSESSMENT — LIFESTYLE VARIABLES
HOW MANY STANDARD DRINKS CONTAINING ALCOHOL DO YOU HAVE ON A TYPICAL DAY: PATIENT DOES NOT DRINK
HOW OFTEN DO YOU HAVE A DRINK CONTAINING ALCOHOL: NEVER

## 2023-07-31 ASSESSMENT — PAIN SCALES - GENERAL: PAINLEVEL_OUTOF10: 5

## 2023-07-31 ASSESSMENT — PAIN - FUNCTIONAL ASSESSMENT
PAIN_FUNCTIONAL_ASSESSMENT: 0-10
PAIN_FUNCTIONAL_ASSESSMENT: 0-10

## 2023-07-31 ASSESSMENT — PAIN DESCRIPTION - LOCATION: LOCATION: LEG;BACK

## 2023-07-31 ASSESSMENT — PAIN DESCRIPTION - ORIENTATION: ORIENTATION: LEFT;RIGHT

## 2023-07-31 NOTE — ED NOTES
TRANSFER - OUT REPORT:    Verbal report given to Bill Goodwin RN on Brandt Ashton  being transferred to [facility] ED for urgent transfer       Report consisted of patient's Situation, Background, Assessment and   Recommendations(SBAR). Information from the following report(s) Nurse Handoff Report, ED Encounter Summary, ED SBAR, [facility], and Recent Results was reviewed with the receiving nurse. Sipsey Fall Assessment:    Presents to emergency department  because of falls (Syncope, seizure, or loss of consciousness): No  Age > 70: No  Altered Mental Status, Intoxication with alcohol or substance confusion (Disorientation, impaired judgment, poor safety awaremess, or inability to follow instructions): No  Impaired Mobility: Ambulates or transfers with assistive devices or assistance; Unable to ambulate or transer.: No  Nursing Judgement: No          Lines:   Peripheral IV 07/31/23 Left;Proximal Forearm (Active)        Opportunity for questions and clarification was provided.       Patient transported with:  Chetna Mendez RN  07/31/23 1853
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
## 2023-08-08 ENCOUNTER — OFFICE VISIT (OUTPATIENT)
Facility: CLINIC | Age: 48
End: 2023-08-08
Payer: MEDICAID

## 2023-08-08 VITALS
WEIGHT: 268 LBS | TEMPERATURE: 98 F | HEART RATE: 75 BPM | OXYGEN SATURATION: 96 % | DIASTOLIC BLOOD PRESSURE: 69 MMHG | HEIGHT: 65 IN | RESPIRATION RATE: 18 BRPM | BODY MASS INDEX: 44.65 KG/M2 | SYSTOLIC BLOOD PRESSURE: 104 MMHG

## 2023-08-08 DIAGNOSIS — Z09 HOSPITAL DISCHARGE FOLLOW-UP: ICD-10-CM

## 2023-08-08 DIAGNOSIS — E78.5 HYPERLIPIDEMIA, UNSPECIFIED HYPERLIPIDEMIA TYPE: ICD-10-CM

## 2023-08-08 DIAGNOSIS — F31.9 BIPOLAR AFFECTIVE DISORDER, REMISSION STATUS UNSPECIFIED (HCC): ICD-10-CM

## 2023-08-08 DIAGNOSIS — I10 ESSENTIAL (PRIMARY) HYPERTENSION: ICD-10-CM

## 2023-08-08 DIAGNOSIS — M54.2 CERVICALGIA: Primary | ICD-10-CM

## 2023-08-08 DIAGNOSIS — S06.0X0A CONCUSSION WITHOUT LOSS OF CONSCIOUSNESS, INITIAL ENCOUNTER: ICD-10-CM

## 2023-08-08 DIAGNOSIS — M54.50 MIDLINE LOW BACK PAIN, UNSPECIFIED CHRONICITY, UNSPECIFIED WHETHER SCIATICA PRESENT: ICD-10-CM

## 2023-08-08 PROCEDURE — 3078F DIAST BP <80 MM HG: CPT | Performed by: FAMILY MEDICINE

## 2023-08-08 PROCEDURE — 99214 OFFICE O/P EST MOD 30 MIN: CPT | Performed by: FAMILY MEDICINE

## 2023-08-08 PROCEDURE — 3074F SYST BP LT 130 MM HG: CPT | Performed by: FAMILY MEDICINE

## 2023-08-08 PROCEDURE — 1111F DSCHRG MED/CURRENT MED MERGE: CPT | Performed by: FAMILY MEDICINE

## 2023-08-08 RX ORDER — CYCLOBENZAPRINE HCL 10 MG
10 TABLET ORAL 3 TIMES DAILY PRN
Qty: 60 TABLET | Refills: 1 | Status: SHIPPED | OUTPATIENT
Start: 2023-08-08

## 2023-08-08 RX ORDER — AZELASTINE HYDROCHLORIDE 137 UG/1
SPRAY, METERED NASAL
COMMUNITY
Start: 2023-08-03

## 2023-08-08 RX ORDER — IBUPROFEN 600 MG/1
600 TABLET ORAL 3 TIMES DAILY PRN
Qty: 60 TABLET | Refills: 1 | Status: SHIPPED | OUTPATIENT
Start: 2023-08-08

## 2023-08-08 SDOH — ECONOMIC STABILITY: FOOD INSECURITY: WITHIN THE PAST 12 MONTHS, YOU WORRIED THAT YOUR FOOD WOULD RUN OUT BEFORE YOU GOT MONEY TO BUY MORE.: NEVER TRUE

## 2023-08-08 SDOH — ECONOMIC STABILITY: FOOD INSECURITY: WITHIN THE PAST 12 MONTHS, THE FOOD YOU BOUGHT JUST DIDN'T LAST AND YOU DIDN'T HAVE MONEY TO GET MORE.: NEVER TRUE

## 2023-08-08 SDOH — ECONOMIC STABILITY: INCOME INSECURITY: HOW HARD IS IT FOR YOU TO PAY FOR THE VERY BASICS LIKE FOOD, HOUSING, MEDICAL CARE, AND HEATING?: NOT HARD AT ALL

## 2023-08-08 ASSESSMENT — PATIENT HEALTH QUESTIONNAIRE - PHQ9
5. POOR APPETITE OR OVEREATING: 0
7. TROUBLE CONCENTRATING ON THINGS, SUCH AS READING THE NEWSPAPER OR WATCHING TELEVISION: 0
SUM OF ALL RESPONSES TO PHQ QUESTIONS 1-9: 0
SUM OF ALL RESPONSES TO PHQ QUESTIONS 1-9: 0
2. FEELING DOWN, DEPRESSED OR HOPELESS: 0
3. TROUBLE FALLING OR STAYING ASLEEP: 0
SUM OF ALL RESPONSES TO PHQ QUESTIONS 1-9: 0
4. FEELING TIRED OR HAVING LITTLE ENERGY: 0
6. FEELING BAD ABOUT YOURSELF - OR THAT YOU ARE A FAILURE OR HAVE LET YOURSELF OR YOUR FAMILY DOWN: 0
SUM OF ALL RESPONSES TO PHQ QUESTIONS 1-9: 0
8. MOVING OR SPEAKING SO SLOWLY THAT OTHER PEOPLE COULD HAVE NOTICED. OR THE OPPOSITE, BEING SO FIGETY OR RESTLESS THAT YOU HAVE BEEN MOVING AROUND A LOT MORE THAN USUAL: 0
1. LITTLE INTEREST OR PLEASURE IN DOING THINGS: 0
10. IF YOU CHECKED OFF ANY PROBLEMS, HOW DIFFICULT HAVE THESE PROBLEMS MADE IT FOR YOU TO DO YOUR WORK, TAKE CARE OF THINGS AT HOME, OR GET ALONG WITH OTHER PEOPLE: 0
9. THOUGHTS THAT YOU WOULD BE BETTER OFF DEAD, OR OF HURTING YOURSELF: 0
SUM OF ALL RESPONSES TO PHQ9 QUESTIONS 1 & 2: 0

## 2023-08-08 NOTE — PROGRESS NOTES
lesions  Neck -paracervical muscle tightness and discomfort to palpation  Lymphatics - no palpable lymphadenopathy  Chest - no tachypnea, retractions or cyanosis  Heart - normal rate and regular rhythm  Abdomen - no rebound tenderness noted  Back exam - limited range of motion, pain with motion noted during exam, tenderness noted paravertebral muscles with discomfort to flexion and extension of the spine  Neurological - abnormal neurological exam unchanged from prior examinations  Musculoskeletal - osteoarthritic changes noted in both hands  Extremities - intact peripheral pulses      Results  No results found for this visit on 08/08/23. ASSESSMENT and PLAN    ICD-10-CM    1. Cervicalgia  M54.2 [facility] Physical [facility]     cyclobenzaprine (FLEXERIL) 10 MG tablet     ibuprofen (ADVIL;MOTRIN) 600 MG tablet     External Referral To Orthopedic Surgery     External Referral To Pain Clinic     CANCELED: Stepan Padgett MD, Physical Medicine & Rehab, [place] ([facility])      2. Midline low back pain, unspecified chronicity, unspecified whether sciatica present  M54.50 [facility]     cyclobenzaprine (FLEXERIL) 10 MG tablet     ibuprofen (ADVIL;MOTRIN) 600 MG tablet     External Referral To Orthopedic Surgery     External Referral To Pain Clinic     CANCELED: Stepan Padgett MD, Physical Medicine [facility] Rehab, [place] ([facility])      3. Concussion without loss of consciousness, initial encounter  S06.0X0A Stacy Stephens MD, Neurology, [facility]     External Referral To Pain Clinic      4. Essential (primary) hypertension  I10       5. Hyperlipidemia, unspecified hyperlipidemia type  E78.5       6. Bipolar affective disorder, remission status unspecified ([facility])  F31.9       7.  Hospital discharge follow-up  Z09 [facility] DISCHARGE MEDS RECONCILED W/ CURRENT OUTPATIENT MED LIST       lab results and schedule of

## 2023-08-09 ENCOUNTER — TELEPHONE (OUTPATIENT)
Facility: CLINIC | Age: 48
End: 2023-08-09

## 2023-08-09 NOTE — TELEPHONE ENCOUNTER
Pt stated she would like a referral to another orthopedic surgeon and pain medicine doctor that are in her network. Please advise.  Thank you!!!

## 2023-08-10 ENCOUNTER — HOSPITAL ENCOUNTER (OUTPATIENT)
Facility: HOSPITAL | Age: 48
Setting detail: RECURRING SERIES
Discharge: HOME | End: 2023-08-13
Payer: MEDICAID

## 2023-08-10 PROCEDURE — 97162 PT EVAL MOD COMPLEX 30 MIN: CPT

## 2023-08-11 ENCOUNTER — TELEPHONE (OUTPATIENT)
Facility: CLINIC | Age: 48
End: 2023-08-11

## 2023-08-11 NOTE — TELEPHONE ENCOUNTER
----- Message from [facility] sent at 8/9/2023  2:46 PM EDT -----  Subject: Referral Request    Reason for referral request? Pain Management, Neurology   Provider patient wants to be referred to(if known):     Provider Phone Number(if known): Additional Information for Provider? Patient called in to request for her   provider to send another referral for a pain management due to the one she   was referred to previously is no longer in network with her insurance. Patient would also like another refer for neurology because the one she   was referred to is backed up until November. Please contact patient to   further assist.   ---------------------------------------------------------------------------  --------------  Goyo [place]    [phone];  Do not leave any message, patient will call back for answer  ---------------------------------------------------------------------------  --------------

## 2023-08-13 DIAGNOSIS — M54.2 CERVICALGIA: ICD-10-CM

## 2023-08-13 DIAGNOSIS — M54.50 MIDLINE LOW BACK PAIN, UNSPECIFIED CHRONICITY, UNSPECIFIED WHETHER SCIATICA PRESENT: Primary | ICD-10-CM

## 2023-08-14 ENCOUNTER — HOSPITAL ENCOUNTER (OUTPATIENT)
Facility: HOSPITAL | Age: 48
Setting detail: RECURRING SERIES
Discharge: HOME | End: 2023-08-17
Payer: MEDICAID

## 2023-08-14 PROCEDURE — 97530 THERAPEUTIC ACTIVITIES: CPT

## 2023-08-14 PROCEDURE — 97110 THERAPEUTIC EXERCISES: CPT

## 2023-08-14 PROCEDURE — 97112 NEUROMUSCULAR REEDUCATION: CPT

## 2023-08-14 NOTE — PROGRESS NOTES
to improve ease of driving. IE: 27 degrees B  Long Term Goals: To be accomplished in 5 weeks  The patient will improve FOTO score to 64 to improve quality of life. IE: 39  The patient will improve lumbar rotation to 75% of WNL to improve ease of rotation. IE: 25% of WNL rotation  The patient will improve lumbar extension to 75% of WNL to improve ease of grasping items from upper shelves. IE: 25% of WNL  The patient will report 75% improvement since [facility] to improve quality of life.               IE: 0%    PLAN  Yes  Continue plan of care  []  Upgrade activities as tolerated  []  Discharge due to :  []  Other:    Jessee Rodriguez, PT    8/14/2023    2:38 PM    Future Appointments   Date Time Provider [address]  [address]   8/17/2023  2:30 PM [place], [place] [facility] [place]   8/23/2023 11:10 AM [facility]if[facility] Almonte[facility] [place]   8/25/2023  9:10 AM Jessee Rodriguez, PT [facility] [place]   8/28/2023 10:30 AM Epifanio Cota, PTA [facility] [place]   8/30/2023 10:30 AM Aubree Gómez, PTA [facility] [place]   9/19/2023  5:00 PM Andrew Rivera MD [facility] BS AMB

## 2023-08-17 ENCOUNTER — TELEPHONE (OUTPATIENT)
Facility: CLINIC | Age: 48
End: 2023-08-17

## 2023-08-17 ENCOUNTER — HOSPITAL ENCOUNTER (OUTPATIENT)
Facility: HOSPITAL | Age: 48
Setting detail: RECURRING SERIES
Discharge: HOME | End: 2023-08-20
Payer: MEDICAID

## 2023-08-17 PROCEDURE — 97530 THERAPEUTIC ACTIVITIES: CPT

## 2023-08-17 PROCEDURE — 97112 NEUROMUSCULAR REEDUCATION: CPT

## 2023-08-17 PROCEDURE — 97110 THERAPEUTIC EXERCISES: CPT

## 2023-08-17 NOTE — PROGRESS NOTES
improve lumbar rotation to 75% of WNL to improve ease of rotation. IE: 25% of WNL rotation  The patient will improve lumbar extension to 75% of WNL to improve ease of grasping items from upper shelves. IE: 25% of WNL  The patient will report 75% improvement since [facility] to improve quality of life.               IE: 0%    PLAN  Yes  Continue plan of care  []  Upgrade activities as tolerated  []  Discharge due to :  []  Other:    Dandre Perez, PT    8/17/2023    2:45 PM    Future Appointments   Date Time Provider [address]  [facility]   8/23/2023 11:10 AM Evangelina Saunders, PTA [facility]   8/25/2023  9:10 AM Theresa Ferrara, PT [facility] [place]   8/28/2023 10:30 AM Ling Pham, PTA [facility]   8/30/2023 10:30 AM Juan Aparicio, [facility]   9/19/2023  5:00 PM Andrew Guzman MD [facility] BS AMB

## 2023-08-17 NOTE — TELEPHONE ENCOUNTER
Patient called and stated that she was seen at urgent care and she was prescribed  Medrol and failed to tell the Doctor that she has hypertension, patient would like to know if this medication interferes with her BP medication.  Please advise

## 2023-08-22 ENCOUNTER — APPOINTMENT (OUTPATIENT)
Facility: HOSPITAL | Age: 48
End: 2023-08-22
Payer: MEDICAID

## 2023-08-24 ENCOUNTER — APPOINTMENT (OUTPATIENT)
Facility: HOSPITAL | Age: 48
End: 2023-08-24
Payer: MEDICAID

## 2023-08-25 ENCOUNTER — HOSPITAL ENCOUNTER (OUTPATIENT)
Facility: HOSPITAL | Age: 48
Setting detail: RECURRING SERIES
Discharge: HOME | End: 2023-08-28
Payer: MEDICAID

## 2023-08-25 PROCEDURE — 97110 THERAPEUTIC EXERCISES: CPT

## 2023-08-25 PROCEDURE — 97530 THERAPEUTIC ACTIVITIES: CPT

## 2023-08-25 PROCEDURE — 97112 NEUROMUSCULAR REEDUCATION: CPT

## 2023-08-25 NOTE — PROGRESS NOTES
rotation to 45 degrees to improve ease of driving. IE: 27 degrees B  Current: 27 degrees,    08/17/2023  Long Term Goals: To be accomplished in 5 weeks  The patient will improve FOTO score to 64 to improve quality of life. IE: 39  The patient will improve lumbar rotation to 75% of WNL to improve ease of rotation. IE: 25% of WNL rotation  The patient will improve lumbar extension to 75% of WNL to improve ease of grasping items from upper shelves. IE: 25% of WNL  The patient will report 75% improvement since [facility] to improve quality of life.               IE: 0%    PLAN  Yes  Continue plan of care  []  Upgrade activities as tolerated  []  Discharge due to :  []  Other:    Carmencita Almaguer PT    8/25/2023    9:31 AM    Future Appointments   Date Time Provider [address]   8/30/2023  4:30 PM Jose David Lui PT [facility]   9/19/2023  5:00 PM Andrew Temple MD [facility] BS AMB

## 2023-08-28 ENCOUNTER — APPOINTMENT (OUTPATIENT)
Facility: HOSPITAL | Age: 48
End: 2023-08-28
Payer: MEDICAID

## 2023-08-29 ENCOUNTER — APPOINTMENT (OUTPATIENT)
Facility: HOSPITAL | Age: 48
End: 2023-08-29
Payer: MEDICAID

## 2023-08-30 ENCOUNTER — HOSPITAL ENCOUNTER (OUTPATIENT)
Facility: HOSPITAL | Age: 48
Setting detail: RECURRING SERIES
Discharge: HOME | End: 2023-09-02
Payer: MEDICAID

## 2023-08-30 PROCEDURE — 97530 THERAPEUTIC ACTIVITIES: CPT

## 2023-08-30 PROCEDURE — 97112 NEUROMUSCULAR REEDUCATION: CPT

## 2023-08-30 PROCEDURE — 97110 THERAPEUTIC EXERCISES: CPT

## 2023-08-30 NOTE — PROGRESS NOTES
FOTO score to 64 to improve quality of life. IE: 39  The patient will improve lumbar rotation to 75% of WNL to improve ease of rotation. IE: 25% of WNL rotation  The patient will improve lumbar extension to 75% of WNL to improve ease of grasping items from upper shelves. IE: 25% of WNL  The patient will report 75% improvement since [facility] to improve quality of life.               IE: 0%   Current: progressing 40% reported on 8-30-23    PLAN  Yes  Continue plan of care  []  Upgrade activities as tolerated  []  Discharge due to :  []  Other:    Sharmin Shepherd PT    8/30/2023    4:43 PM    Future Appointments   Date Time Provider [address]   9/13/2023  4:30 PM Gonzalo Gutierrez PTA [facility]   9/18/2023  3:50 PM Majo Channing Home, PT [facility]   9/19/2023  5:00 PM Andrew Borrero MD [facility] BS AMB   9/20/2023  4:30 PM Gonzalo uGtierrez PTA [facility]   9/25/2023  4:30 PM Majo Channing Home, PT [facility]   9/27/2023  4:30 PM KASSY Fall

## 2023-08-31 ENCOUNTER — APPOINTMENT (OUTPATIENT)
Facility: HOSPITAL | Age: 48
End: 2023-08-31
Payer: MEDICAID

## 2023-09-12 RX ORDER — LABETALOL 300 MG/1
300 TABLET, FILM COATED ORAL 2 TIMES DAILY
Qty: 180 TABLET | Refills: 1 | Status: SHIPPED | OUTPATIENT
Start: 2023-09-12

## 2023-09-12 NOTE — PROGRESS NOTES
Pt presents for Covid testing per PCP. bacitracin & band aid applied/Verbal/written post procedure instructions were given to patient/caregiver./Instructed patient/caregiver to follow-up with primary care physician./Instructed patient/caregiver regarding signs and symptoms of infection./Keep the cast/splint/dressing clean and dry.

## 2023-09-13 ENCOUNTER — HOSPITAL ENCOUNTER (OUTPATIENT)
Facility: HOSPITAL | Age: 48
Setting detail: RECURRING SERIES
Discharge: HOME | End: 2023-09-16
Payer: MEDICAID

## 2023-09-13 PROCEDURE — 97530 THERAPEUTIC ACTIVITIES: CPT

## 2023-09-13 PROCEDURE — 97112 NEUROMUSCULAR REEDUCATION: CPT

## 2023-09-13 PROCEDURE — 97110 THERAPEUTIC EXERCISES: CPT

## 2023-09-13 NOTE — PROGRESS NOTES
[address] [facility] - Ph: [phone]   Fx: [phone]    PHYSICAL THERAPY PROGRESS NOTE      Patient name: Venancio Samuel Start of Care: 8/10/2023   Referral source: Josh Diaz MD : 1975    Medical Diagnosis: Cervicalgia [M54.2]  Other low back pain [M54.59]  Payor: Ryley Adamson / Plan: Ryley Adamson / Product Type: *No Product type* /  Onset Date:2023    Treatment Diagnosis: M54.2  NECK PAIN and M54.59  OTHER LOWER BACK PAIN     Prior Hospitalization: see medical history Provider#: [id number]   Medications: Verified on Patient summary List   Comorbidities: BMI > 40, HTN, Bipolar,   Prior Level of Function: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 Visits from Start of Care: 6    Missed Visits: 1    Goals/Measure of Progress: To be achieved in 4 weeks: The patient will demonstrate independencea nd compliance with HEP to maximize therapeutic benefit. IE: issued HEP              PN: Met - reports compliance. The patient will improve cervical rotation to 45 degrees to improve ease of driving. IE: 27 degrees B  PN: 27 degrees. The patient will improve FOTO score to 64 to improve quality of life. IE: 39              PN: 49%. The patient will improve lumbar rotation to 75% of WNL to improve ease of rotation. IE: 25% of WNL rotation              PN: Rotation Left 50%, Right 25%. The patient will improve lumbar extension to 75% of WNL to improve ease of grasping items from upper shelves. IE: 25% of WNL              PN: Met, ~75% WNL's. The patient will report 75% improvement since [facility] to improve quality of life. IE: 0%              PN: progressing 40% reported. Summary of Care/ Key Functional Changes:     FOTO 49%.  AROM L/S Rotation Left 50%,

## 2023-09-13 NOTE — PROGRESS NOTES
PHYSICAL / OCCUPATIONAL THERAPY - DAILY TREATMENT NOTE (updated )    Patient Name: Nicko Jameson    Date: 2023    : 1975  Insurance: Payor: Manjit Martínez / Plan: Manjit Martínez / Product Type: *No Product type* /      Patient  verified Yes     Visit #   Current / Total 6 10   Time   In / Out 4:30 5:03   Pain   In / Out 4/10 2/10   Subjective Functional Status/Changes: \"Pain in my left shoulder and neck. I have to leave by 5pm.\"   Changes to: Allergies, Med Hx, Sx Hx?   no       TREATMENT AREA =  Cervicalgia [M54.2]  Other low back pain [M54.59]    OBJECTIVE    Therapeutic Procedures: Tx Min Billable or 1:1 Min (if diff from Tx Min) Procedure, Rationale, Specifics   17  22470 Therapeutic Exercise (timed):  increase ROM, strength, coordination, balance, and proprioception to improve patient's ability to progress to PLOF and address remaining functional goals. (see flow sheet as applicable)    Details if applicable:       8  98587 Neuromuscular Re-Education (timed):  improve balance, coordination, kinesthetic sense, posture, core stability and proprioception to improve patient's ability to develop conscious control of individual muscles and awareness of position of extremities in order to progress to PLOF and address remaining functional goals. (see flow sheet as applicable)    Details if applicable:  1/2 prone glute activation, wall slides with cueing scapulothoracic mechanics. 8  85679 Therapeutic Activity (timed):  use of dynamic activities replicating functional movements to increase ROM, strength, coordination, balance, and proprioception in order to improve patient's ability to progress to PLOF and address remaining functional goals. (see flow sheet as applicable)     Details if applicable:  Seated wand flexion to improve ease with reaching [place] cabinets, trunk Rotation to improve ease with transfers.    [address] Totals Reminder: bill using total billable min of TIMED therapeutic

## 2023-09-18 ENCOUNTER — HOSPITAL ENCOUNTER (OUTPATIENT)
Facility: HOSPITAL | Age: 48
Setting detail: RECURRING SERIES
Discharge: HOME | End: 2023-09-21
Payer: MEDICAID

## 2023-09-18 PROCEDURE — 97112 NEUROMUSCULAR REEDUCATION: CPT

## 2023-09-18 PROCEDURE — 97110 THERAPEUTIC EXERCISES: CPT

## 2023-09-18 NOTE — PROGRESS NOTES
PHYSICAL / OCCUPATIONAL THERAPY - DAILY TREATMENT NOTE (updated )    Patient Name: Mariia Kurtz    Date: 2023    : 1975  Insurance: Payor: nuevoStage May / Plan: nuevoStage May / Product Type: *No Product type* /      Patient  verified Yes     Visit #   Current / Total 7 18   Time   In / Out 4:00 4:46   Pain   In / Out 4 2   Subjective Functional Status/Changes: Pt reports her left shoulder is hurting today. Changes to: Allergies, Med Hx, Sx Hx?   no       TREATMENT AREA =  Cervicalgia [M54.2]  Other low back pain [M54.59]    OBJECTIVE    Modalities Rationale:     decrease pain and increase tissue extensibility to improve patient's ability to progress to PLOF and address remaining functional goals. 10 min  unbill []  Ice     [x]  Heat    location/position: Low back and left neck/UT region; in sitting   Skin assessment post-treatment (if applicable):    [x]  intact    [x]  redness- no adverse reaction                 []redness - adverse reaction:          Therapeutic Procedures: Tx Min Billable or 1:1 Min (if diff from Tx Min) Procedure, Rationale, Specifics   22 22 61915 Therapeutic Exercise (timed):  increase ROM, strength, coordination, balance, and proprioception to improve patient's ability to progress to PLOF and address remaining functional goals. (see flow sheet as applicable)    Details if applicable:       4 0 45128 Manual Therapy (timed):  decrease pain, increase ROM, increase tissue extensibility, and increase postural awareness to improve patient's ability to progress to PLOF and address remaining functional goals. The manual therapy interventions were performed at a separate and distinct time from the therapeutic activities interventions . (see flow sheet as applicable)     Details if applicable: In supine: MET to correct right posterior/left anterior innominates.    10 10 [id number] Neuromuscular Re-Education (timed):  improve balance, coordination, kinesthetic sense,

## 2023-09-19 ENCOUNTER — TELEMEDICINE (OUTPATIENT)
Facility: CLINIC | Age: 48
End: 2023-09-19
Payer: MEDICAID

## 2023-09-19 DIAGNOSIS — I10 ESSENTIAL (PRIMARY) HYPERTENSION: ICD-10-CM

## 2023-09-19 DIAGNOSIS — R73.03 PREDIABETES: ICD-10-CM

## 2023-09-19 DIAGNOSIS — F31.9 BIPOLAR AFFECTIVE DISORDER, REMISSION STATUS UNSPECIFIED (HCC): ICD-10-CM

## 2023-09-19 DIAGNOSIS — E78.5 HYPERLIPIDEMIA, UNSPECIFIED HYPERLIPIDEMIA TYPE: ICD-10-CM

## 2023-09-19 DIAGNOSIS — U07.1 COVID-19: Primary | ICD-10-CM

## 2023-09-19 PROCEDURE — 99213 OFFICE O/P EST LOW 20 MIN: CPT | Performed by: FAMILY MEDICINE

## 2023-09-19 SDOH — ECONOMIC STABILITY: FOOD INSECURITY: WITHIN THE PAST 12 MONTHS, YOU WORRIED THAT YOUR FOOD WOULD RUN OUT BEFORE YOU GOT MONEY TO BUY MORE.: NEVER TRUE

## 2023-09-19 SDOH — ECONOMIC STABILITY: FOOD INSECURITY: WITHIN THE PAST 12 MONTHS, THE FOOD YOU BOUGHT JUST DIDN'T LAST AND YOU DIDN'T HAVE MONEY TO GET MORE.: NEVER TRUE

## 2023-09-19 SDOH — ECONOMIC STABILITY: INCOME INSECURITY: HOW HARD IS IT FOR YOU TO PAY FOR THE VERY BASICS LIKE FOOD, HOUSING, MEDICAL CARE, AND HEATING?: NOT VERY HARD

## 2023-09-19 ASSESSMENT — PATIENT HEALTH QUESTIONNAIRE - PHQ9
1. LITTLE INTEREST OR PLEASURE IN DOING THINGS: 0
2. FEELING DOWN, DEPRESSED OR HOPELESS: 0
10. IF YOU CHECKED OFF ANY PROBLEMS, HOW DIFFICULT HAVE THESE PROBLEMS MADE IT FOR YOU TO DO YOUR WORK, TAKE CARE OF THINGS AT HOME, OR GET ALONG WITH OTHER PEOPLE: 0
8. MOVING OR SPEAKING SO SLOWLY THAT OTHER PEOPLE COULD HAVE NOTICED. OR THE OPPOSITE, BEING SO FIGETY OR RESTLESS THAT YOU HAVE BEEN MOVING AROUND A LOT MORE THAN USUAL: 0
3. TROUBLE FALLING OR STAYING ASLEEP: 0
6. FEELING BAD ABOUT YOURSELF - OR THAT YOU ARE A FAILURE OR HAVE LET YOURSELF OR YOUR FAMILY DOWN: 0
4. FEELING TIRED OR HAVING LITTLE ENERGY: 0
SUM OF ALL RESPONSES TO PHQ QUESTIONS 1-9: 0
5. POOR APPETITE OR OVEREATING: 0
9. THOUGHTS THAT YOU WOULD BE BETTER OFF DEAD, OR OF HURTING YOURSELF: 0
SUM OF ALL RESPONSES TO PHQ9 QUESTIONS 1 & 2: 0
SUM OF ALL RESPONSES TO PHQ QUESTIONS 1-9: 0
7. TROUBLE CONCENTRATING ON THINGS, SUCH AS READING THE NEWSPAPER OR WATCHING TELEVISION: 0

## 2023-09-19 NOTE — PROGRESS NOTES
2023    TELEHEALTH EVALUATION -- Audio/Visual    HPI:    Zakia Yuan (:  1975) has requested an audio/video evaluation for the following concern(s):    COVID-19: Patient was diagnosed with COVID-19 infection. She has nasal congestion, cough, sore throat, fever and chills. She was seen in urgent care. States that he had a strep test done that was negative. She had a COVID-19 test done that was positive. She was given instructions on supportive care. She was given prescription of Augmentin. She would like some medication for COVID-19. I will send it Paxlovid to take twice a day for 5 days. We discussed supportive care. Hypertension: Patient has hypertension. She is on labetalol and HCTZ. She is stable on the medications. She will continue current treatment plan. Hyperglycemia: We will check HbA1c. Patient has prediabetes. She will intensify lifestyle and dietary modification. Dyslipidemia: Patient has dyslipidemia. She is on atorvastatin. She will exercise and take a diet low in polysaturated fats. Bipolar disorder: Patient has bipolar disorder. She has been seen by behavioral health specialist.  She is on Abilify. She will continue with this medication. Review of Systems Review of all systems is negative except as noted above in the HPI. Prior to Visit Medications    Medication Sig Taking? Authorizing Provider   nirmatrelvir/ritonavir 300/100 (PAXLOVID) 20 x 150 MG & 10 x 100MG TBPK Take 3 tablets (two 150 mg nirmatrelvir and one 100 mg ritonavir tablets) by mouth every 12 hours for 5 days.  Yes Hesed Zeina Saha MD   labetalol (NORMODYNE) 300 MG tablet take 1 tablet by mouth twice a day Yes Hesed JAZMIN Pablo MD   Azelastine HCl 137 MCG/SPRAY SOLN instill 2 sprays into each nostril twice a day Yes Historical Provider, MD   cyclobenzaprine (FLEXERIL) 10 MG tablet Take 1 tablet by mouth 3 times daily as needed for Muscle spasms Yes Hesed Zeina Saha MD   ibuprofen (ADVIL;MOTRIN)

## 2023-09-19 NOTE — PROGRESS NOTES
1. \"Have you been to the ER, urgent care clinic since your last visit? Hospitalized since your last visit? \"Yes  Urgent care    2. \"Have you seen or consulted any other health care providers outside of the [address] [place] [address] since your last visit? \" No    3. For patients aged 43-73: Has the patient had a colonoscopy / FIT/ Cologuard? Yes      If the patient is female:    4. For patients aged 43-66: Has the patient had a mammogram within the past 2 years? Yes      5. For patients aged 21-65: Has the patient had a pap smear?  No

## 2023-09-25 ENCOUNTER — HOSPITAL ENCOUNTER (OUTPATIENT)
Facility: HOSPITAL | Age: 48
Setting detail: RECURRING SERIES
Discharge: HOME | End: 2023-09-28
Payer: MEDICAID

## 2023-09-25 PROCEDURE — 97112 NEUROMUSCULAR REEDUCATION: CPT

## 2023-09-25 PROCEDURE — 97110 THERAPEUTIC EXERCISES: CPT

## 2023-09-25 PROCEDURE — 97535 SELF CARE MNGMENT TRAINING: CPT

## 2023-09-25 NOTE — PROGRESS NOTES
PHYSICAL / OCCUPATIONAL THERAPY - DAILY TREATMENT NOTE (updated )    Patient Name: Joycelyn Morrell    Date: 2023    : 1975  Insurance: Payor: Mychal Sears / Plan: Mychal Sears / Product Type: *No Product type* /      Patient  verified Yes     Visit #   Current / Total 8 18   Time   In / Out 4:30 5:00   Pain   In / Out 2 0   Subjective Functional Status/Changes: Pt reports her low back is good today but has some pain in the left UT region. Changes to: Allergies, Med Hx, Sx Hx?   no       TREATMENT AREA =  Cervicalgia [M54.2]  Other low back pain [M54.59]    OBJECTIVE  Therapeutic Procedures: Tx Min Billable or 1:1 Min (if diff from Tx Min) Procedure, Rationale, Specifics   8 8 64153 Therapeutic Exercise (timed):  increase ROM, strength, coordination, balance, and proprioception to improve patient's ability to progress to PLOF and address remaining functional goals. (see flow sheet as applicable)    Details if applicable:  exercises, ROM measurements     3 0 34884 Manual Therapy (timed):  decrease pain, increase ROM, increase tissue extensibility, and increase postural awareness to improve patient's ability to progress to PLOF and address remaining functional goals. The manual therapy interventions were performed at a separate and distinct time from the therapeutic activities interventions . (see flow sheet as applicable)     Details if applicable: In supine: DTM/TPR left UT, pelvic alignment and leg length assessments   8 8 72847 Self Care/Home Management (timed):  improve patient knowledge and understanding of pain reducing techniques, positioning, posture/ergonomics, home safety, activity modification, diagnosis/prognosis, and physical therapy expectations, procedures and progression  to improve patient's ability to progress to PLOF and address remaining functional goals.   (see flow sheet as applicable)     Details if applicable:  pt education on performing left 1st rib mob with towel

## 2023-10-03 DIAGNOSIS — I10 ESSENTIAL (PRIMARY) HYPERTENSION: ICD-10-CM

## 2023-10-11 ENCOUNTER — HOSPITAL ENCOUNTER (OUTPATIENT)
Facility: HOSPITAL | Age: 48
Setting detail: RECURRING SERIES
Discharge: HOME | End: 2023-10-14
Payer: MEDICAID

## 2023-10-11 PROCEDURE — 97530 THERAPEUTIC ACTIVITIES: CPT

## 2023-10-11 PROCEDURE — 97110 THERAPEUTIC EXERCISES: CPT

## 2023-10-11 PROCEDURE — 97112 NEUROMUSCULAR REEDUCATION: CPT

## 2023-10-11 NOTE — PROGRESS NOTES
[facility]  [address], [facility]  [phone] fax    Physical Therapy Discharge Summary  Patient name: Esteban Ahn Start of Care: 8/10/2023   Referral source: Jorden Murphy MD : 1975   Medical/Treatment Diagnosis: Cervicalgia [M54.2]  Other low back pain [M54.59]  Payor: Neema Klein / Plan: Neema Klein / Product Type: *No Product type* /  Onset Date:2023     Prior Hospitalization: see medical history Provider#: [id number]   Medications: Verified on Patient Summary List    Comorbidities: BMI > 40, HTN, Bipolar,   Prior Level of Function: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 Visits from Start of Care: 9    Missed Visits: 3    Reporting Period : 2023 to 10/11/2023    Goals/Measure of Progress:  Note/Recertification  The patient will demonstrate independencea nd compliance with HEP to maximize therapeutic benefit. IE: issued HEP              PN: Met - reports compliance. The patient will improve cervical rotation to 45 degrees to improve ease of driving. IE: 27 degrees B  PN: 27 degrees. Current: Met, AROM C/S Rotation Left 70 without pain, Right 63 degrees with pain. The patient will improve FOTO score to 64 to improve quality of life. IE: 39              PN: 49%. Current: No change, 49%. The patient will improve lumbar rotation to 75% of WNL to improve ease of rotation. IE: 25% of WNL rotation              PN: Rotation Left 50%, Right 25%. Current: MET, right 52 degs with tightness, left 66 degs. The patient will improve lumbar extension to 75% of WNL to improve ease of grasping items from upper shelves. IE: 25% of WNL              PN: Met, ~75% WNL's. The patient will report 75% improvement since [facility] to improve quality of life.
partially met most other than FOTO goal. TrP still  noted in Left UT. D/C PT at this time to HEP and instructed pt to look for massage therapy, pt reports that her job gives her access to massages. Progress toward goals / Updated goals:  []  See Progress Note/Recertification  The patient will demonstrate independencea nd compliance with HEP to maximize therapeutic benefit. IE: issued HEP              PN: Met - reports compliance. The patient will improve cervical rotation to 45 degrees to improve ease of driving. IE: 27 degrees B  PN: 27 degrees. Current: Met, AROM C/S Rotation Left 70 without pain, Right 63 degrees with pain. 10/11/2023     The patient will improve FOTO score to 64 to improve quality of life. IE: 39              PN: 49%. Current: No change, 49%. 10/11/2023  The patient will improve lumbar rotation to 75% of WNL to improve ease of rotation. IE: 25% of WNL rotation              PN: Rotation Left 50%, Right 25%. Current: MET, right 52 degs with tightness, left 66 degs 9/25/2023  The patient will improve lumbar extension to 75% of WNL to improve ease of grasping items from upper shelves. IE: 25% of WNL              PN: Met, ~75% WNL's. The patient will report 75% improvement since [facility] to improve quality of life. IE: 0%              PN: progressing 40% reported. Current: reports less pain overall 9/25/2023    PLAN  No  Continue plan of care  []  Upgrade activities as tolerated  [x]  Discharge due to : Partially met LTG's. D/C PT to HEP at this time. []  Other:    Mariposa Diallo, GABRIELA    10/11/2023    3:59 PM    No future appointments.

## 2023-10-12 RX ORDER — POTASSIUM CHLORIDE 750 MG/1
10 TABLET, EXTENDED RELEASE ORAL DAILY
Qty: 90 TABLET | Refills: 1 | Status: SHIPPED | OUTPATIENT
Start: 2023-10-12

## 2023-10-18 RX ORDER — HYDROCHLOROTHIAZIDE 25 MG/1
TABLET ORAL
Qty: 90 TABLET | Refills: 1 | Status: SHIPPED | OUTPATIENT
Start: 2023-10-18

## 2023-11-04 ENCOUNTER — HOSPITAL ENCOUNTER (OUTPATIENT)
Facility: HOSPITAL | Age: 48
Discharge: HOME | End: 2023-11-07

## 2023-11-04 LAB — SENTARA SPECIMEN COLLECTION: NORMAL

## 2023-11-05 LAB
A/G RATIO: 1.5 RATIO (ref 1.1–2.6)
ALBUMIN SERPL-MCNC: 4.4 G/DL (ref 3.5–5)
ALP BLD-CCNC: 78 U/L (ref 25–115)
ALT SERPL-CCNC: 10 U/L (ref 5–40)
ANION GAP SERPL CALCULATED.3IONS-SCNC: 10 MMOL/L (ref 3–15)
AST SERPL-CCNC: 14 U/L (ref 10–37)
AVERAGE GLUCOSE: 107 MG/DL (ref 91–123)
BASOPHILS # BLD: 1 % (ref 0–2)
BASOPHILS ABSOLUTE: 0 K/UL (ref 0–0.2)
BILIRUB SERPL-MCNC: 0.8 MG/DL (ref 0.2–1.2)
BUN BLDV-MCNC: 14 MG/DL (ref 6–22)
CALCIUM SERPL-MCNC: 9.7 MG/DL (ref 8.4–10.5)
CHLORIDE BLD-SCNC: 97 MMOL/L (ref 98–110)
CHOLESTEROL/HDL RATIO: 3.5 (ref 0–5)
CHOLESTEROL: 126 MG/DL (ref 110–200)
CO2: 28 MMOL/L (ref 20–32)
CREAT SERPL-MCNC: 0.9 MG/DL (ref 0.5–1.2)
EOSINOPHIL # BLD: 3 % (ref 0–6)
EOSINOPHILS ABSOLUTE: 0.2 K/UL (ref 0–0.5)
GLOBULIN: 2.9 G/DL (ref 2–4)
GLOMERULAR FILTRATION RATE: >60 ML/MIN/1.73 SQ.M.
GLUCOSE: 91 MG/DL (ref 70–99)
HBA1C MFR BLD: 5.4 % (ref 4.8–5.6)
HCT VFR BLD CALC: 33.9 % (ref 35.1–48)
HDLC SERPL-MCNC: 36 MG/DL
HEMOGLOBIN: 10.2 G/DL (ref 11.7–16)
LDL CHOLESTEROL CALCULATED: 75 MG/DL (ref 50–99)
LDL/HDL RATIO: 2.1
LYMPHOCYTES # BLD: 34 % (ref 20–45)
LYMPHOCYTES ABSOLUTE: 1.9 K/UL (ref 1–4.8)
MCH RBC QN AUTO: 24 PG (ref 26–34)
MCHC RBC AUTO-ENTMCNC: 30 G/DL (ref 31–36)
MCV RBC AUTO: 79 FL (ref 80–99)
MONOCYTES ABSOLUTE: 0.4 K/UL (ref 0.1–1)
MONOCYTES: 8 % (ref 3–12)
NEUTROPHILS ABSOLUTE: 3 K/UL (ref 1.8–7.7)
NEUTROPHILS: 54 % (ref 40–75)
NON-HDL CHOLESTEROL: 90 MG/DL
PDW BLD-RTO: 14.2 % (ref 10–15.5)
PLATELET # BLD: 481 K/UL (ref 140–440)
PMV BLD AUTO: 9.5 FL (ref 9–13)
POTASSIUM SERPL-SCNC: 3.3 MMOL/L (ref 3.5–5.5)
RBC: 4.31 M/UL (ref 3.8–5.2)
SODIUM BLD-SCNC: 135 MMOL/L (ref 133–145)
TOTAL PROTEIN: 7.3 G/DL (ref 6.4–8.3)
TRIGL SERPL-MCNC: 74 MG/DL (ref 40–149)
VLDLC SERPL CALC-MCNC: 15 MG/DL (ref 8–30)
WBC: 5.4 K/UL (ref 4–11)

## 2023-11-06 ENCOUNTER — HOSPITAL ENCOUNTER (OUTPATIENT)
Facility: HOSPITAL | Age: 48
Discharge: HOME | End: 2023-11-09
Payer: MEDICAID

## 2023-11-06 DIAGNOSIS — S43.402D UNSPECIFIED SPRAIN OF LEFT SHOULDER JOINT, SUBSEQUENT ENCOUNTER: ICD-10-CM

## 2023-11-06 PROCEDURE — 73221 MRI JOINT UPR EXTREM W/O DYE: CPT

## 2023-11-12 DIAGNOSIS — D64.9 CHRONIC ANEMIA: Primary | ICD-10-CM

## 2023-11-12 DIAGNOSIS — I10 ESSENTIAL (PRIMARY) HYPERTENSION: ICD-10-CM

## 2023-11-12 RX ORDER — POTASSIUM CHLORIDE 750 MG/1
10 TABLET, EXTENDED RELEASE ORAL 2 TIMES DAILY
Qty: 180 TABLET | Refills: 1 | Status: SHIPPED | OUTPATIENT
Start: 2023-11-12

## 2023-11-16 NOTE — PROGRESS NOTES
Labs ordered by PCP at this time. Patient tolerated well at this time. Venipunture to patient left forearm. No other concerns noted
Depression/Self Injurious Behavior/Suicidality
Anxiety/Self Injurious Behavior

## 2023-11-21 ENCOUNTER — OFFICE VISIT (OUTPATIENT)
Facility: CLINIC | Age: 48
End: 2023-11-21
Payer: MEDICAID

## 2023-11-21 VITALS
RESPIRATION RATE: 20 BRPM | HEART RATE: 72 BPM | OXYGEN SATURATION: 97 % | BODY MASS INDEX: 44.98 KG/M2 | DIASTOLIC BLOOD PRESSURE: 67 MMHG | WEIGHT: 270 LBS | SYSTOLIC BLOOD PRESSURE: 120 MMHG | HEIGHT: 65 IN | TEMPERATURE: 98.1 F

## 2023-11-21 DIAGNOSIS — E78.5 HYPERLIPIDEMIA, UNSPECIFIED HYPERLIPIDEMIA TYPE: ICD-10-CM

## 2023-11-21 DIAGNOSIS — Z23 ENCOUNTER FOR IMMUNIZATION: ICD-10-CM

## 2023-11-21 DIAGNOSIS — D50.9 MICROCYTIC ANEMIA: ICD-10-CM

## 2023-11-21 DIAGNOSIS — I10 ESSENTIAL (PRIMARY) HYPERTENSION: Primary | ICD-10-CM

## 2023-11-21 DIAGNOSIS — E87.6 HYPOKALEMIA: ICD-10-CM

## 2023-11-21 DIAGNOSIS — Z11.59 NEED FOR HEPATITIS C SCREENING TEST: ICD-10-CM

## 2023-11-21 DIAGNOSIS — E66.01 MORBID (SEVERE) OBESITY DUE TO EXCESS CALORIES (HCC): ICD-10-CM

## 2023-11-21 DIAGNOSIS — F31.9 BIPOLAR AFFECTIVE DISORDER, REMISSION STATUS UNSPECIFIED (HCC): ICD-10-CM

## 2023-11-21 PROBLEM — K21.9 GERD (GASTROESOPHAGEAL REFLUX DISEASE): Status: ACTIVE | Noted: 2023-11-21

## 2023-11-21 PROBLEM — R41.3 MEMORY DEFICIT: Status: ACTIVE | Noted: 2023-09-11

## 2023-11-21 PROBLEM — S06.9XAA TRAUMATIC BRAIN INJURY (HCC): Status: ACTIVE | Noted: 2023-09-11

## 2023-11-21 PROBLEM — N30.00 ACUTE CYSTITIS WITHOUT HEMATURIA: Status: ACTIVE | Noted: 2023-08-01
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
## 2023-11-21 PROCEDURE — 90471 IMMUNIZATION ADMIN: CPT | Performed by: FAMILY MEDICINE

## 2023-11-21 PROCEDURE — 99214 OFFICE O/P EST MOD 30 MIN: CPT | Performed by: FAMILY MEDICINE

## 2023-11-21 PROCEDURE — 3074F SYST BP LT 130 MM HG: CPT | Performed by: FAMILY MEDICINE

## 2023-11-21 PROCEDURE — 90674 CCIIV4 VAC NO PRSV 0.5 ML IM: CPT | Performed by: FAMILY MEDICINE

## 2023-11-21 PROCEDURE — 3078F DIAST BP <80 MM HG: CPT | Performed by: FAMILY MEDICINE

## 2023-11-21 SDOH — ECONOMIC STABILITY: INCOME INSECURITY: HOW HARD IS IT FOR YOU TO PAY FOR THE VERY BASICS LIKE FOOD, HOUSING, MEDICAL CARE, AND HEATING?: NOT HARD AT ALL

## 2023-11-21 SDOH — ECONOMIC STABILITY: FOOD INSECURITY: WITHIN THE PAST 12 MONTHS, YOU WORRIED THAT YOUR FOOD WOULD RUN OUT BEFORE YOU GOT MONEY TO BUY MORE.: NEVER TRUE

## 2023-11-21 SDOH — ECONOMIC STABILITY: FOOD INSECURITY: WITHIN THE PAST 12 MONTHS, THE FOOD YOU BOUGHT JUST DIDN'T LAST AND YOU DIDN'T HAVE MONEY TO GET MORE.: NEVER TRUE

## 2023-11-21 ASSESSMENT — PATIENT HEALTH QUESTIONNAIRE - PHQ9
1. LITTLE INTEREST OR PLEASURE IN DOING THINGS: 0
2. FEELING DOWN, DEPRESSED OR HOPELESS: 0
8. MOVING OR SPEAKING SO SLOWLY THAT OTHER PEOPLE COULD HAVE NOTICED. OR THE OPPOSITE, BEING SO FIGETY OR RESTLESS THAT YOU HAVE BEEN MOVING AROUND A LOT MORE THAN USUAL: 0
SUM OF ALL RESPONSES TO PHQ QUESTIONS 1-9: 0
SUM OF ALL RESPONSES TO PHQ QUESTIONS 1-9: 0
7. TROUBLE CONCENTRATING ON THINGS, SUCH AS READING THE NEWSPAPER OR WATCHING TELEVISION: 0
SUM OF ALL RESPONSES TO PHQ9 QUESTIONS 1 & 2: 0
4. FEELING TIRED OR HAVING LITTLE ENERGY: 0
10. IF YOU CHECKED OFF ANY PROBLEMS, HOW DIFFICULT HAVE THESE PROBLEMS MADE IT FOR YOU TO DO YOUR WORK, TAKE CARE OF THINGS AT HOME, OR GET ALONG WITH OTHER PEOPLE: 0
6. FEELING BAD ABOUT YOURSELF - OR THAT YOU ARE A FAILURE OR HAVE LET YOURSELF OR YOUR FAMILY DOWN: 0
SUM OF ALL RESPONSES TO PHQ QUESTIONS 1-9: 0
9. THOUGHTS THAT YOU WOULD BE BETTER OFF DEAD, OR OF HURTING YOURSELF: 0
5. POOR APPETITE OR OVEREATING: 0
3. TROUBLE FALLING OR STAYING ASLEEP: 0
SUM OF ALL RESPONSES TO PHQ QUESTIONS 1-9: 0

## 2023-11-21 ASSESSMENT — COLUMBIA-SUICIDE SEVERITY RATING SCALE - C-SSRS
4. HAVE YOU HAD THESE THOUGHTS AND HAD SOME INTENTION OF ACTING ON THEM?: NO
5. HAVE YOU STARTED TO WORK OUT OR WORKED OUT THE DETAILS OF HOW TO KILL YOURSELF? DO YOU INTEND TO CARRY OUT THIS PLAN?: NO
3. HAVE YOU BEEN THINKING ABOUT HOW YOU MIGHT KILL YOURSELF?: NO
7. DID THIS OCCUR IN THE LAST THREE MONTHS: NO

## 2023-11-21 NOTE — PROGRESS NOTES
STELLA  David Pierre comes in for follow-up care. Hypertension: Patient has hypertension. Blood pressure is stable. Patient is on labetalol and HCTZ. Patient will continue with the current treatment plan. Patient will intensify lifestyle and dietary modification and take a low-sodium diet. Hypokalemia: Patient has hypokalemia. She has not been taking her potassium replacement therapy. She is on HCTZ. She has a prescription for potassium supplementation. She has potassium. She will take this medication. Dyslipidemia: Patient has dyslipidemia. Patient is on atorvastatin. Patient will exercise and take a diet low in polysaturated fats. I will send in refill of medication. We will recheck lipid panel. Mood disorder: Patient has mood disorder with anxiety and depression. She also has a history of bipolar disorder. She is seen by behavioral health specialist.  She is on Abilify. We will continue current treatment plan. Microcytic anemia: Patient noted to have microcytic anemia. I will recheck CBC, TIBC and the iron profile. We will also check ferritin levels. Patient has been referred to see the hematologist.  Morbid obesity: Patient has a BMI of 44.93. She will intensify lifestyle and dietary modification. Health maintenance: Patient will get flu vaccine today.   We will check hepatitis C antibody screening test.      Past Medical History  Past Medical History:   Diagnosis Date    Bipolar 1 disorder ([address])     Borderline diabetes     Genital herpes     Hyperlipidemia     Hypertension        Surgical History  Past Surgical History:   Procedure Laterality Date     SECTION      COLONOSCOPY N/A 2022    COLONOSCOPY/ Polypectomy performed by Nati Ricks MD at [facility] ENDOSCOPY        Medications  Current Outpatient Medications   Medication Sig Dispense Refill    potassium chloride (KLOR-CON M) 10 MEQ extended release tablet Take 1 tablet by mouth 2 times daily 180 tablet 1

## 2023-11-21 NOTE — PROGRESS NOTES
1. \"Have you been to the ER, urgent care clinic since your last visit? Hospitalized since your last visit? \"No    2. \"Have you seen or consulted any other health care providers outside of the [address] [place] [address] since your last visit? \" No    3. For patients aged 43-73: Has the patient had a colonoscopy / FIT/ Cologuard? Yes      If the patient is female:    4. For patients aged 43-66: Has the patient had a mammogram within the past 2 years? Yes      5. For patients aged 21-65: Has the patient had a pap smear?  No

## 2023-11-27 ENCOUNTER — TELEPHONE (OUTPATIENT)
Facility: HOSPITAL | Age: 48
End: 2023-11-27

## 2023-12-11 ENCOUNTER — HOSPITAL ENCOUNTER (OUTPATIENT)
Facility: HOSPITAL | Age: 48
Setting detail: RECURRING SERIES
Discharge: HOME | End: 2023-12-14
Payer: MEDICAID

## 2023-12-11 PROCEDURE — 97162 PT EVAL MOD COMPLEX 30 MIN: CPT

## 2023-12-11 NOTE — PROGRESS NOTES
PHYSICAL / OCCUPATIONAL THERAPY - DAILY TREATMENT NOTE (updated )    Patient Name: Bonnie Adams    Date: 2023    : 1975  Insurance: Payor: Maria D Bhat / Plan: Maria D Home / Product Type: *No Product type* /      Patient  verified Yes     Visit #   Current / Total 1 8   Time   In / Out 4:32 5:10   Pain   In / Out 3/10 0/10   Subjective Functional Status/Changes: See IE   Changes to: Allergies, Med Hx, Sx Hx?   no       TREATMENT AREA =  Cervicalgia [M54.2]  Other low back pain [M54.59]    OBJECTIVE    Modalities Rationale:     decrease pain and increase tissue extensibility to improve patient's ability to progress to PLOF and address remaining functional goals. min [x] Estim Unattended, type/location:                                      []  w/ice    []  w/heat    min [] Estim Attended, type/location:                                     []  w/US     []  w/ice    []  w/heat    []  TENS insruct      min []  Mechanical Traction: type/lbs                   []  pro   []  sup   []  int   []  cont    []  before manual    []  after manual    min []  Ultrasound, settings/location:      min []  Iontophoresis w/ dexamethasone, location:                                               []  take home patch       []  in clinic       10 min  unbilled []  Ice     [x]  Heat    location/position:  Lumbar spine/cervical spine pt seated    min []  Paraffin,  details:     min []  Vasopneumatic Device, press/temp:     min []  Jessica Blue / [place]: If using vaso (only need to measure limb vaso being performed on)      pre-treatment girth :       post-treatment girth :       measured at (landmark location) :      min []  Other:    Skin assessment post-treatment (if applicable):    []  intact    []  redness- no adverse reaction                 []redness - adverse reaction:         Therapeutic Procedures:   Tx Min Billable or 1:1 Min (if diff from Tx Min) Procedure, Rationale, Specifics   15  [id number] Therapeutic
ADLs.    Frequency / Duration: Patient to be seen 2 times per week for 4 weeks  Goals will be assigned and reassessed every 10 visits/ 30 days per guidelines . Patient/ Caregiver education and instruction: Diagnosis, prognosis, self care, activity modification, and exercises [x]  Plan of care has been reviewed with GABRIELA Snider, PT       12/11/2023       6:55 PM    I certify that the above Therapy Services are being furnished while the patient is under my care. I agree with the treatment plan and certify that this therapy is necessary. Physician's Signature:_________________________   DATE:_________   TIME:________                           EITAN Cervantes*  Insurance: Payor: Josefina Dhillon / Plan: Josefina Dhillon / Product Type: *No Product type* /      ** Signature, Date and Time must be completed for valid certification **  [facility] and [facility] or you may fax the signed copy to [phone]. Thank you.

## 2023-12-21 ENCOUNTER — HOSPITAL ENCOUNTER (OUTPATIENT)
Facility: HOSPITAL | Age: 48
Setting detail: RECURRING SERIES
End: 2023-12-21
Payer: MEDICAID

## 2024-01-04 ENCOUNTER — TELEPHONE (OUTPATIENT)
Facility: HOSPITAL | Age: 49
End: 2024-01-04

## 2024-01-04 NOTE — TELEPHONE ENCOUNTER
patient called at 710 to say she would be late. informed her of 10 min alexandra period. says she wouldnt make it time so she rescheduled.

## 2024-02-14 ENCOUNTER — HOSPITAL ENCOUNTER (OUTPATIENT)
Facility: HOSPITAL | Age: 49
Discharge: HOME | End: 2024-02-17

## 2024-02-14 LAB — SENTARA SPECIMEN COLLECTION: NORMAL

## 2024-02-15 LAB
ANION GAP SERPL CALCULATED.3IONS-SCNC: 10 MMOL/L (ref 3–15)
BUN BLDV-MCNC: 11 MG/DL (ref 6–22)
CALCIUM SERPL-MCNC: 9.4 MG/DL (ref 8.4–10.5)
CHLORIDE BLD-SCNC: 99 MMOL/L (ref 98–110)
CO2: 29 MMOL/L (ref 20–32)
CREAT SERPL-MCNC: 0.8 MG/DL (ref 0.5–1.2)
FERRITIN: 34 NG/ML (ref 10–291)
GLOMERULAR FILTRATION RATE: >60 ML/MIN/1.73 SQ.M.
GLUCOSE: 99 MG/DL (ref 70–99)
HEPATITIS C ANTIBODY: NORMAL
IRON % SATURATION: 16 % (ref 20–50)
IRON: 62 MCG/DL (ref 30–160)
POTASSIUM SERPL-SCNC: 4.1 MMOL/L (ref 3.5–5.5)
SODIUM BLD-SCNC: 138 MMOL/L (ref 133–145)
TOTAL IRON BINDING CAPACITY: 376 MCG/DL (ref 228–428)
UIBC: 314 MCG/DL (ref 110–370)

## 2024-02-20 ENCOUNTER — OFFICE VISIT (OUTPATIENT)
Facility: CLINIC | Age: 49
End: 2024-02-20
Payer: MEDICAID

## 2024-02-20 VITALS
WEIGHT: 275 LBS | HEIGHT: 64 IN | OXYGEN SATURATION: 97 % | DIASTOLIC BLOOD PRESSURE: 70 MMHG | HEART RATE: 80 BPM | RESPIRATION RATE: 20 BRPM | BODY MASS INDEX: 46.95 KG/M2 | SYSTOLIC BLOOD PRESSURE: 119 MMHG | TEMPERATURE: 97.8 F

## 2024-02-20 DIAGNOSIS — E78.5 HYPERLIPIDEMIA, UNSPECIFIED HYPERLIPIDEMIA TYPE: ICD-10-CM

## 2024-02-20 DIAGNOSIS — I10 ESSENTIAL (PRIMARY) HYPERTENSION: Primary | ICD-10-CM

## 2024-02-20 DIAGNOSIS — E66.01 MORBID (SEVERE) OBESITY DUE TO EXCESS CALORIES (HCC): ICD-10-CM

## 2024-02-20 DIAGNOSIS — F31.9 BIPOLAR AFFECTIVE DISORDER, REMISSION STATUS UNSPECIFIED (HCC): ICD-10-CM

## 2024-02-20 DIAGNOSIS — R09.81 SINUS CONGESTION: ICD-10-CM

## 2024-02-20 PROBLEM — D64.9 ANEMIA: Status: ACTIVE | Noted: 2024-02-20

## 2024-02-20 PROCEDURE — 3078F DIAST BP <80 MM HG: CPT | Performed by: FAMILY MEDICINE

## 2024-02-20 PROCEDURE — 99214 OFFICE O/P EST MOD 30 MIN: CPT | Performed by: FAMILY MEDICINE

## 2024-02-20 PROCEDURE — 3074F SYST BP LT 130 MM HG: CPT | Performed by: FAMILY MEDICINE

## 2024-02-20 RX ORDER — ATORVASTATIN CALCIUM 10 MG/1
10 TABLET, FILM COATED ORAL DAILY
Qty: 90 TABLET | Refills: 1 | Status: CANCELLED | OUTPATIENT
Start: 2024-02-20

## 2024-02-20 RX ORDER — AMLODIPINE BESYLATE 10 MG/1
10 TABLET ORAL DAILY
Qty: 30 TABLET | Refills: 2 | Status: SHIPPED | OUTPATIENT
Start: 2024-02-20

## 2024-02-20 RX ORDER — ATORVASTATIN CALCIUM 10 MG/1
10 TABLET, FILM COATED ORAL DAILY
Qty: 90 TABLET | Refills: 1 | Status: SHIPPED | OUTPATIENT
Start: 2024-02-20

## 2024-02-20 RX ORDER — LEVOCETIRIZINE DIHYDROCHLORIDE 5 MG/1
TABLET, FILM COATED ORAL
COMMUNITY
Start: 2024-02-19

## 2024-02-20 RX ORDER — AMLODIPINE BESYLATE 10 MG/1
10 TABLET ORAL DAILY
Qty: 30 TABLET | Refills: 2 | Status: SHIPPED | OUTPATIENT
Start: 2024-02-20 | End: 2024-02-20

## 2024-02-20 ASSESSMENT — PATIENT HEALTH QUESTIONNAIRE - PHQ9
5. POOR APPETITE OR OVEREATING: 0
8. MOVING OR SPEAKING SO SLOWLY THAT OTHER PEOPLE COULD HAVE NOTICED. OR THE OPPOSITE, BEING SO FIGETY OR RESTLESS THAT YOU HAVE BEEN MOVING AROUND A LOT MORE THAN USUAL: 0
7. TROUBLE CONCENTRATING ON THINGS, SUCH AS READING THE NEWSPAPER OR WATCHING TELEVISION: 0
10. IF YOU CHECKED OFF ANY PROBLEMS, HOW DIFFICULT HAVE THESE PROBLEMS MADE IT FOR YOU TO DO YOUR WORK, TAKE CARE OF THINGS AT HOME, OR GET ALONG WITH OTHER PEOPLE: 0
1. LITTLE INTEREST OR PLEASURE IN DOING THINGS: 0
4. FEELING TIRED OR HAVING LITTLE ENERGY: 0
2. FEELING DOWN, DEPRESSED OR HOPELESS: 0
SUM OF ALL RESPONSES TO PHQ QUESTIONS 1-9: 0
SUM OF ALL RESPONSES TO PHQ9 QUESTIONS 1 & 2: 0
9. THOUGHTS THAT YOU WOULD BE BETTER OFF DEAD, OR OF HURTING YOURSELF: 0
6. FEELING BAD ABOUT YOURSELF - OR THAT YOU ARE A FAILURE OR HAVE LET YOURSELF OR YOUR FAMILY DOWN: 0
3. TROUBLE FALLING OR STAYING ASLEEP: 0
SUM OF ALL RESPONSES TO PHQ QUESTIONS 1-9: 0

## 2024-02-20 NOTE — PROGRESS NOTES
[facility]  Cristina Salazar comes in for follow-up care.  Hypertension: Patient has a history of hypertension.  She has been on labetalol 300 mg twice a day and the HCTZ.  She states that that she would like to discontinue the labetalol and try different medication.  After discussion we will put her on amlodipine 10 mg daily.  She will continue with the HCTZ.  She will keep a blood pressure log and we will follow-up at next visit or sooner as needed.  Sinus congestion: Patient has sinus congestion.  She has been seen by the ENT specialist.  She is on Xyzal.  She will follow-up with recommendations as per the ENT specialist.  She also uses the Flonase nasal spray.  Dyslipidemia: Patient has dyslipidemia.  She is on Lipitor 10 mg daily.  She will exercise and take a diet low in polysaturated fats.  Recheck lipid panel at next visit.  Bipolar disorder: Patient has a history of bipolar disorder.  She is on Abilify.  Stable on medication.  Followed up by behavioral health specialist.  Morbid obesity: Patient has a BMI of 47.20.  She will intensify lifestyle and dietary modification.    Past Medical History  Past Medical History:   Diagnosis Date    Bipolar 1 disorder (HCC)     Borderline diabetes     Genital herpes     Hyperlipidemia     Hypertension        Surgical History  Past Surgical History:   Procedure Laterality Date     SECTION      COLONOSCOPY N/A 2022    COLONOSCOPY/ Polypectomy performed by Hugh Brand MD at [facility] ENDOSCOPY        Medications  Current Outpatient Medications   Medication Sig Dispense Refill    levocetirizine (XYZAL) 5 MG tablet       atorvastatin (LIPITOR) 10 MG tablet Take 1 tablet by mouth daily 90 tablet 1    amLODIPine (NORVASC) 10 MG tablet Take 1 tablet by mouth daily 30 tablet 2    potassium chloride (KLOR-CON M) 10 MEQ extended release tablet Take 1 tablet by mouth 2 times daily 180 tablet 1    hydroCHLOROthiazide (HYDRODIURIL) 25 MG tablet take 1 tablet by mouth once

## 2024-02-20 NOTE — PROGRESS NOTES
\"Have you been to the ER, urgent care clinic since your last visit?  Hospitalized since your last visit?\"    NO    “Have you seen or consulted any other health care providers outside of [facility] since your last visit?”    NO        “Have you had a pap smear?”    YES - Where: [facility]   Nurse/ABHIJIT to request most recent records if not in the chart

## 2024-02-24 ENCOUNTER — TELEPHONE (OUTPATIENT)
Age: 49
End: 2024-02-24

## 2024-02-24 NOTE — TELEPHONE ENCOUNTER
Received a text from the call center about Ms. Salazar requesting clarification about her medications after her visit with her PCP.    Patient states she is currently on labetalol 300 mg twice daily and HCTZ.  Patient states that per her last encounter with her provider, she was expected to discontinue labetalol and begin amlodipine.  Patient states she would like to confirm when to start her new medication.  Patient states she last took the labetalol the prior night.    Patient was advised to begin amlodipine this morning in place of labetalol, and continue HCTZ per her providers notes.  Patient also had concerns about continuing potassium supplements, she is currently on 10 mEq potassium chloride daily.  Her most recent potassium noted at 4.1.  Patient was advised to reach out to her provider on Monday to clarify continuation of her potassium supplements.  Patient confirms understanding and has no further questions.

## 2024-02-26 ENCOUNTER — OFFICE VISIT (OUTPATIENT)
Facility: CLINIC | Age: 49
End: 2024-02-26
Payer: MEDICAID

## 2024-02-26 VITALS
HEIGHT: 64 IN | DIASTOLIC BLOOD PRESSURE: 74 MMHG | WEIGHT: 271 LBS | TEMPERATURE: 98.1 F | BODY MASS INDEX: 46.26 KG/M2 | RESPIRATION RATE: 20 BRPM | OXYGEN SATURATION: 99 % | HEART RATE: 97 BPM | SYSTOLIC BLOOD PRESSURE: 121 MMHG

## 2024-02-26 DIAGNOSIS — I10 ESSENTIAL (PRIMARY) HYPERTENSION: ICD-10-CM

## 2024-02-26 DIAGNOSIS — F31.9 BIPOLAR AFFECTIVE DISORDER, REMISSION STATUS UNSPECIFIED (HCC): ICD-10-CM

## 2024-02-26 DIAGNOSIS — J32.4 CHRONIC PANSINUSITIS: ICD-10-CM

## 2024-02-26 DIAGNOSIS — M25.521 RIGHT ELBOW PAIN: Primary | ICD-10-CM

## 2024-02-26 PROCEDURE — 3074F SYST BP LT 130 MM HG: CPT | Performed by: FAMILY MEDICINE

## 2024-02-26 PROCEDURE — 99213 OFFICE O/P EST LOW 20 MIN: CPT | Performed by: FAMILY MEDICINE

## 2024-02-26 PROCEDURE — 3078F DIAST BP <80 MM HG: CPT | Performed by: FAMILY MEDICINE

## 2024-02-26 NOTE — PROGRESS NOTES
\"Have you been to the ER, urgent care clinic since your last visit?  Hospitalized since your last visit?\"    NO    “Have you seen or consulted any other health care providers outside of [facility] since your last visit?”    NO        “Have you had a pap smear?”    NO

## 2024-02-26 NOTE — PROGRESS NOTES
[facility]  Cristina Salazar comes in for follow-up care.  Hypertension: Patient has hypertension.  Blood pressure is stable.  Previously she was on labetalol.  She stopped taking the medication.  Currently taking amlodipine and HCTZ.  Blood pressure is stable.  She should continue current treatment plan.  Patient is concerned that her heart rate has gone up and is in the 90s.  She denies shortness of breath, diaphoresis or chest tightness or syncope.  She will maintain adequate fluid intake.  I did reassure her that her heart rate is still within the normal range.  She had stopped taking her beta-blocker there is likely the resultant effect with slight elevation in the pulse rate but this is within normal.  Elbow and arm pain: Patient has pain and discomfort right elbow.  This is mainly on the medial aspect around the medial epicondyle.  Denies trauma.  Pain noted with pronation, supination and the flexion at the elbow joint.  No obvious swelling or redness.  I will order an x-ray of the elbow.  She denies weakness of the arm.  She can take ibuprofen as needed for the pain.  Suspect overuse syndrome with tendinitis.  Chronic sinusitis: Patient has nasal and sinus congestion.  She is using azelastine and Xyzal.  Still has some clear nasal drainage that comes on and off.  I discussed referral to see the ENT specialist.  Patient would prefer to hold off for now.  Mood disorder: Patient has bipolar disorder.  She is on Abilify.  She will continue with the medication.      Past Medical History  Past Medical History:   Diagnosis Date    Bipolar 1 disorder (HCC)     Borderline diabetes     Genital herpes     Hyperlipidemia     Hypertension        Surgical History  Past Surgical History:   Procedure Laterality Date     SECTION      COLONOSCOPY N/A 2022    COLONOSCOPY/ Polypectomy performed by Hugh Brand MD at [facility] ENDOSCOPY        Medications  Current Outpatient Medications   Medication Sig Dispense Refill

## 2024-03-05 ENCOUNTER — TELEPHONE (OUTPATIENT)
Facility: CLINIC | Age: 49
End: 2024-03-05

## 2024-03-06 ENCOUNTER — TELEPHONE (OUTPATIENT)
Facility: CLINIC | Age: 49
End: 2024-03-06

## 2024-03-06 NOTE — TELEPHONE ENCOUNTER
Pt calling for xray results. Ms. Salazar can be reached at [phone]. Please advise. Thank you!!!

## 2024-03-12 RX ORDER — LABETALOL 300 MG/1
300 TABLET, FILM COATED ORAL 2 TIMES DAILY
Qty: 180 TABLET | Refills: 1 | OUTPATIENT
Start: 2024-03-12

## 2024-03-19 ENCOUNTER — OFFICE VISIT (OUTPATIENT)
Facility: CLINIC | Age: 49
End: 2024-03-19
Payer: MEDICAID

## 2024-03-19 VITALS
HEART RATE: 82 BPM | OXYGEN SATURATION: 98 % | BODY MASS INDEX: 45.82 KG/M2 | WEIGHT: 275 LBS | SYSTOLIC BLOOD PRESSURE: 126 MMHG | DIASTOLIC BLOOD PRESSURE: 78 MMHG | RESPIRATION RATE: 20 BRPM | HEIGHT: 65 IN

## 2024-03-19 DIAGNOSIS — R05.9 COUGH, UNSPECIFIED TYPE: Primary | ICD-10-CM

## 2024-03-19 DIAGNOSIS — I10 ESSENTIAL (PRIMARY) HYPERTENSION: ICD-10-CM

## 2024-03-19 DIAGNOSIS — E78.5 HYPERLIPIDEMIA, UNSPECIFIED HYPERLIPIDEMIA TYPE: ICD-10-CM

## 2024-03-19 DIAGNOSIS — F31.9 BIPOLAR AFFECTIVE DISORDER, REMISSION STATUS UNSPECIFIED (HCC): ICD-10-CM

## 2024-03-19 PROCEDURE — 3074F SYST BP LT 130 MM HG: CPT | Performed by: FAMILY MEDICINE

## 2024-03-19 PROCEDURE — 3078F DIAST BP <80 MM HG: CPT | Performed by: FAMILY MEDICINE

## 2024-03-19 PROCEDURE — 99213 OFFICE O/P EST LOW 20 MIN: CPT | Performed by: FAMILY MEDICINE

## 2024-03-19 RX ORDER — DEXTROMETHORPHAN HYDROBROMIDE AND PROMETHAZINE HYDROCHLORIDE 15; 6.25 MG/5ML; MG/5ML
5 SYRUP ORAL 4 TIMES DAILY PRN
Qty: 240 ML | Refills: 0 | Status: SHIPPED | OUTPATIENT
Start: 2024-03-19

## 2024-03-19 RX ORDER — ALBUTEROL SULFATE 90 UG/1
2 AEROSOL, METERED RESPIRATORY (INHALATION) 4 TIMES DAILY PRN
Qty: 18 G | Refills: 0 | Status: SHIPPED | OUTPATIENT
Start: 2024-03-19

## 2024-03-19 NOTE — PROGRESS NOTES
HPI  Cristina Salazar comes in for acute care.  Cough with wheeze: Patient has cough that has been ongoing for the past 2 weeks.  She was seen earlier in the emergency room and diagnosed with influenza B.  She did supportive care.  She has been on Tessalon Perles and Coricidin.  Cough persists.  It is worse at night.  Question of when this.  Occasionally gets short of breath and chest tightness.  Denies fever or chills.  Discussed supportive care measures.  I will send in Promethazine DM to take for the cough.  I will also send in albuterol inhaler to use as needed for wheeze.  If symptoms persist she will come back in for reevaluation.  Hypertension: Patient has hypertension.  Blood pressure is stable.  She is on amlodipine and HCTZ.  Blood pressure is stable.  Denies headache, changes in vision or focal weakness.  Will continue current treatment plan.  She will take a low-sodium diet.  Mood disorder: Patient has bipolar 1 disorder.  She is on Abilify.  He is followed up by the behavioral health specialist.  Dyslipidemia: Patient has dyslipidemia.  She is on Lipitor.  Stable on medication.  Continue current treatment plan.      Past Medical History  Past Medical History:   Diagnosis Date    Bipolar 1 disorder (HCC)     Borderline diabetes     Genital herpes     Hyperlipidemia     Hypertension        Surgical History  Past Surgical History:   Procedure Laterality Date     SECTION      COLONOSCOPY N/A 2022    COLONOSCOPY/ Polypectomy performed by Hugh Brand MD at [facility] ENDOSCOPY        Medications  Current Outpatient Medications   Medication Sig Dispense Refill    levocetirizine (XYZAL) 5 MG tablet       atorvastatin (LIPITOR) 10 MG tablet Take 1 tablet by mouth daily 90 tablet 1    amLODIPine (NORVASC) 10 MG tablet Take 1 tablet by mouth daily 30 tablet 2    potassium chloride (KLOR-CON M) 10 MEQ extended release tablet Take 1 tablet by mouth 2 times daily 180 tablet 1    hydroCHLOROthiazide

## 2024-03-19 NOTE — PROGRESS NOTES
\"Have you been to the ER, urgent care clinic since your last visit?  Hospitalized since your last visit?\"    NO    “Have you seen or consulted any other health care providers outside of [facility] since your last visit?”    NO     “Have you had a pap smear?”    NO    Date of last Cervical Cancer screen (HPV or PAP): 8/9/2018             Click Here for Release of Records Request

## 2024-04-09 RX ORDER — HYDROCHLOROTHIAZIDE 25 MG/1
TABLET ORAL
Qty: 90 TABLET | Refills: 1 | Status: SHIPPED | OUTPATIENT
Start: 2024-04-09

## 2024-05-14 ENCOUNTER — OFFICE VISIT (OUTPATIENT)
Facility: CLINIC | Age: 49
End: 2024-05-14
Payer: COMMERCIAL

## 2024-05-14 VITALS
WEIGHT: 276 LBS | OXYGEN SATURATION: 97 % | TEMPERATURE: 98.2 F | BODY MASS INDEX: 45.98 KG/M2 | SYSTOLIC BLOOD PRESSURE: 132 MMHG | HEART RATE: 84 BPM | DIASTOLIC BLOOD PRESSURE: 74 MMHG | RESPIRATION RATE: 18 BRPM | HEIGHT: 65 IN

## 2024-05-14 DIAGNOSIS — E78.5 HYPERLIPIDEMIA, UNSPECIFIED HYPERLIPIDEMIA TYPE: ICD-10-CM

## 2024-05-14 DIAGNOSIS — I10 ESSENTIAL (PRIMARY) HYPERTENSION: ICD-10-CM

## 2024-05-14 DIAGNOSIS — M25.551 PAIN OF RIGHT HIP: ICD-10-CM

## 2024-05-14 DIAGNOSIS — M54.2 CERVICALGIA: ICD-10-CM

## 2024-05-14 DIAGNOSIS — J32.4 PANSINUSITIS, UNSPECIFIED CHRONICITY: Primary | ICD-10-CM

## 2024-05-14 DIAGNOSIS — E66.01 MORBID OBESITY (HCC): ICD-10-CM

## 2024-05-14 DIAGNOSIS — M54.50 MIDLINE LOW BACK PAIN, UNSPECIFIED CHRONICITY, UNSPECIFIED WHETHER SCIATICA PRESENT: ICD-10-CM

## 2024-05-14 PROCEDURE — 3078F DIAST BP <80 MM HG: CPT | Performed by: FAMILY MEDICINE

## 2024-05-14 PROCEDURE — 99213 OFFICE O/P EST LOW 20 MIN: CPT | Performed by: FAMILY MEDICINE

## 2024-05-14 PROCEDURE — 3075F SYST BP GE 130 - 139MM HG: CPT | Performed by: FAMILY MEDICINE

## 2024-05-14 RX ORDER — AMLODIPINE BESYLATE 10 MG/1
10 TABLET ORAL DAILY
Qty: 90 TABLET | Refills: 1 | Status: SHIPPED | OUTPATIENT
Start: 2024-05-14

## 2024-05-14 RX ORDER — IBUPROFEN 600 MG/1
600 TABLET ORAL 3 TIMES DAILY PRN
Qty: 60 TABLET | Refills: 1 | Status: SHIPPED | OUTPATIENT
Start: 2024-05-14

## 2024-05-14 RX ORDER — AMOXICILLIN AND CLAVULANATE POTASSIUM 875; 125 MG/1; MG/1
1 TABLET, FILM COATED ORAL 2 TIMES DAILY
Qty: 14 TABLET | Refills: 0 | Status: SHIPPED | OUTPATIENT
Start: 2024-05-14 | End: 2024-05-21

## 2024-05-14 RX ORDER — HYDROCHLOROTHIAZIDE 25 MG/1
25 TABLET ORAL DAILY
Qty: 90 TABLET | Refills: 1 | Status: CANCELLED | OUTPATIENT
Start: 2024-05-14

## 2024-05-14 NOTE — PROGRESS NOTES
1. \"Have you been to the ER, urgent care clinic since your last visit?  Hospitalized since your last visit?\"No    2. \"Have you seen or consulted any other health care providers outside of the [facility] since your last visit?\" No    3. For patients aged 45-75: Has the patient had a colonoscopy / FIT/ Cologuard? Yes      If the patient is female:    4. For patients aged 40-74: Has the patient had a mammogram within the past 2 years? Yes      5. For patients aged 21-65: Has the patient had a pap smear? Yes    
use: No    Sexual activity: Not on file   Other Topics Concern    Not on file   Social History Narrative    Not on file     Social Determinants of Health     Financial Resource Strain: Low Risk  (11/21/2023)    Overall Financial Resource Strain (CARDIA)     Difficulty of Paying Living Expenses: Not hard at all   Food Insecurity: Not on file (11/21/2023)   Transportation Needs: Unknown (11/21/2023)    PRAPARE - Transportation     Lack of Transportation (Medical): Not on file     Lack of Transportation (Non-Medical): No   Physical Activity: Not on file   Stress: Not on file   Social Connections: Not on file   Intimate Partner Violence: Not on file   Housing Stability: Unknown (11/21/2023)    Housing Stability Vital Sign     Unable to Pay for Housing in the Last Year: Not on file     Number of Places Lived in the Last Year: Not on file     Unstable Housing in the Last Year: No       Review of Systems  Review of Systems - Review of all systems is negative except as noted above in the HPI.    Vital Signs  /74   Pulse 84   Temp 98.2 °F (36.8 °C)   Resp 18   Ht 1.651 m (5' 5\")   Wt 125.2 kg (276 lb)   LMP 04/20/2024 (Approximate)   SpO2 97%   BMI 45.93 kg/m²       Physical Exam  Physical Examination: General appearance - oriented to person, place, and time, overweight, and acyanotic, in no respiratory distress  Mental status - alert, oriented to person, place, and time  Neck - supple, no significant adenopathy  Lymphatics - no palpable lymphadenopathy  Chest - clear to auscultation, no wheezes, rales or rhonchi, symmetric air entry  Heart - S1 and S2 normal  Abdomen - no rebound tenderness noted  Back exam - limited range of motion  Neurological -no focal neurological deficit  Musculoskeletal - discomfort to palpation right hip margins.  No limitation in flexion, extension internal or external rotation  Extremities - intact peripheral pulses    Results  No results found for this visit on

## 2024-06-14 ENCOUNTER — TRANSCRIBE ORDERS (OUTPATIENT)
Facility: HOSPITAL | Age: 49
End: 2024-06-14

## 2024-06-14 DIAGNOSIS — Z12.31 SCREENING MAMMOGRAM FOR HIGH-RISK PATIENT: Primary | ICD-10-CM

## 2024-06-28 ENCOUNTER — HOSPITAL ENCOUNTER (OUTPATIENT)
Facility: HOSPITAL | Age: 49
Discharge: HOME | End: 2024-06-28
Payer: COMMERCIAL

## 2024-06-28 VITALS — BODY MASS INDEX: 44.98 KG/M2 | HEIGHT: 65 IN | WEIGHT: 270 LBS

## 2024-06-28 DIAGNOSIS — Z12.31 SCREENING MAMMOGRAM FOR HIGH-RISK PATIENT: ICD-10-CM

## 2024-06-28 PROCEDURE — 77063 BREAST TOMOSYNTHESIS BI: CPT

## 2024-07-12 DIAGNOSIS — J30.9 ALLERGIC RHINITIS, UNSPECIFIED SEASONALITY, UNSPECIFIED TRIGGER: ICD-10-CM

## 2024-07-12 DIAGNOSIS — R09.81 SINUS CONGESTION: ICD-10-CM

## 2024-07-12 NOTE — TELEPHONE ENCOUNTER
Last seen 5/14/2024   Last labs   Last filled  7/25/2023  Next appointment 7/18/2024     Lab Results   Component Value Date     02/14/2024    K 4.1 02/14/2024    CL 99 02/14/2024    CO2 29 02/14/2024    BUN 11 02/14/2024    CREATININE 0.8 02/14/2024    GLUCOSE 99 02/14/2024    CALCIUM 9.4 02/14/2024    BILITOT 0.8 11/04/2023    ALKPHOS 78 11/04/2023    AST 14 11/04/2023    ALT 10 11/04/2023    LABGLOM >60.0 02/14/2024    GFRAA >60.0 09/22/2021    AGRATIO 1.5 11/04/2023    GLOB 2.9 11/04/2023

## 2024-07-15 RX ORDER — FLUTICASONE PROPIONATE 50 MCG
2 SPRAY, SUSPENSION (ML) NASAL DAILY
Qty: 16 G | Refills: 0 | Status: SHIPPED | OUTPATIENT
Start: 2024-07-15

## 2024-07-18 ENCOUNTER — OFFICE VISIT (OUTPATIENT)
Facility: CLINIC | Age: 49
End: 2024-07-18
Payer: COMMERCIAL

## 2024-07-18 VITALS
BODY MASS INDEX: 46.75 KG/M2 | HEART RATE: 94 BPM | TEMPERATURE: 97.7 F | WEIGHT: 280.6 LBS | OXYGEN SATURATION: 95 % | SYSTOLIC BLOOD PRESSURE: 124 MMHG | HEIGHT: 65 IN | RESPIRATION RATE: 16 BRPM | DIASTOLIC BLOOD PRESSURE: 80 MMHG

## 2024-07-18 DIAGNOSIS — I10 ESSENTIAL (PRIMARY) HYPERTENSION: ICD-10-CM

## 2024-07-18 DIAGNOSIS — D64.9 ANEMIA, UNSPECIFIED TYPE: ICD-10-CM

## 2024-07-18 DIAGNOSIS — E66.01 MORBID OBESITY (HCC): Primary | ICD-10-CM

## 2024-07-18 DIAGNOSIS — F39 MOOD DISORDER (HCC): ICD-10-CM

## 2024-07-18 DIAGNOSIS — E78.5 HYPERLIPIDEMIA, UNSPECIFIED HYPERLIPIDEMIA TYPE: ICD-10-CM

## 2024-07-18 PROCEDURE — 3079F DIAST BP 80-89 MM HG: CPT | Performed by: FAMILY MEDICINE

## 2024-07-18 PROCEDURE — 3074F SYST BP LT 130 MM HG: CPT | Performed by: FAMILY MEDICINE

## 2024-07-18 PROCEDURE — 99214 OFFICE O/P EST MOD 30 MIN: CPT | Performed by: FAMILY MEDICINE

## 2024-07-18 RX ORDER — DIETHYLPROPION HYDROCHLORIDE 75 MG/1
75 TABLET ORAL DAILY
Qty: 30 TABLET | Refills: 0 | Status: SHIPPED | OUTPATIENT
Start: 2024-07-18 | End: 2024-08-17

## 2024-07-18 RX ORDER — ATORVASTATIN CALCIUM 10 MG/1
10 TABLET, FILM COATED ORAL DAILY
Qty: 90 TABLET | Refills: 1 | Status: SHIPPED | OUTPATIENT
Start: 2024-07-18

## 2024-07-18 RX ORDER — POTASSIUM CHLORIDE 750 MG/1
10 TABLET, EXTENDED RELEASE ORAL DAILY
Qty: 90 TABLET | Refills: 1 | Status: SHIPPED | OUTPATIENT
Start: 2024-07-18

## 2024-07-18 SDOH — ECONOMIC STABILITY: FOOD INSECURITY: WITHIN THE PAST 12 MONTHS, THE FOOD YOU BOUGHT JUST DIDN'T LAST AND YOU DIDN'T HAVE MONEY TO GET MORE.: NEVER TRUE

## 2024-07-18 SDOH — ECONOMIC STABILITY: FOOD INSECURITY: WITHIN THE PAST 12 MONTHS, YOU WORRIED THAT YOUR FOOD WOULD RUN OUT BEFORE YOU GOT MONEY TO BUY MORE.: NEVER TRUE

## 2024-07-18 SDOH — ECONOMIC STABILITY: INCOME INSECURITY: HOW HARD IS IT FOR YOU TO PAY FOR THE VERY BASICS LIKE FOOD, HOUSING, MEDICAL CARE, AND HEATING?: NOT HARD AT ALL

## 2024-07-18 ASSESSMENT — PATIENT HEALTH QUESTIONNAIRE - PHQ9
1. LITTLE INTEREST OR PLEASURE IN DOING THINGS: NOT AT ALL
SUM OF ALL RESPONSES TO PHQ QUESTIONS 1-9: 0
2. FEELING DOWN, DEPRESSED OR HOPELESS: NOT AT ALL
SUM OF ALL RESPONSES TO PHQ QUESTIONS 1-9: 0
SUM OF ALL RESPONSES TO PHQ9 QUESTIONS 1 & 2: 0

## 2024-07-18 NOTE — PROGRESS NOTES
\"Have you been to the ER, urgent care clinic since your last visit?  Hospitalized since your last visit?\"    NO    “Have you seen or consulted any other health care providers outside of [facility] since your last visit?”    NO     “Have you had a pap smear?”    NO    Date of last Cervical Cancer screen (HPV or PAP): 8/9/2018             Click Here for Release of Records Request    
No   Physical Activity: Not on file   Stress: Not on file   Social Connections: Not on file   Intimate Partner Violence: Not on file   Housing Stability: Unknown (7/18/2024)    Housing Stability Vital Sign     Unable to Pay for Housing in the Last Year: Not on file     Number of Places Lived in the Last Year: Not on file     Unstable Housing in the Last Year: No       Review of Systems  Review of Systems - Review of all systems is negative except as noted above in the HPI.    Vital Signs  /80   Pulse 94   Temp 97.7 °F (36.5 °C) (Temporal)   Resp 16   Ht 1.651 m (5' 5\")   Wt 127.3 kg (280 lb 9.6 oz)   LMP 06/18/2024   SpO2 95%   BMI 46.69 kg/m²       Physical Exam  Physical Examination: General appearance - alert, well appearing, and in no distress, oriented to person, place, and time, overweight, and acyanotic, in no respiratory distress  Mental status - alert, oriented to person, place, and time  Neck - supple, no significant adenopathy  Lymphatics - no palpable lymphadenopathy, no hepatosplenomegaly  Chest - clear to auscultation, no wheezes, rales or rhonchi, symmetric air entry  Heart - S1 and S2 normal  Abdomen - no rebound tenderness noted  Back exam - limited range of motion  Neurological - normal muscle tone, no tremors, strength 5/5  Musculoskeletal - no muscular tenderness noted  Extremities - intact peripheral pulses        Results  No results found for this visit on 07/18/24.    ASSESSMENT and PLAN    ICD-10-CM    1. Morbid obesity (HCC)  E66.01 Diethylpropion HCl CR 75 MG TB24      2. Hyperlipidemia, unspecified hyperlipidemia type  E78.5 atorvastatin (LIPITOR) 10 MG tablet      3. Essential (primary) hypertension  I10 potassium chloride (KLOR-CON M) 10 MEQ extended release tablet      4. Anemia, unspecified type  D64.9       5. Mood disorder (HCC)  F39       lab results and schedule of future lab studies reviewed with patient  reviewed diet, exercise and weight control  cardiovascular

## 2024-07-19 ENCOUNTER — TELEPHONE (OUTPATIENT)
Facility: CLINIC | Age: 49
End: 2024-07-19

## 2024-07-19 NOTE — TELEPHONE ENCOUNTER
Pt calling because her medication Diethylpropion HCl CR 75 MG TB24 needs a prior authorization. Please advise. Thank you!!!

## 2024-07-19 NOTE — TELEPHONE ENCOUNTER
Insurance denied coverage. Patient states she will call insurance and let us know if anything is needed from us. Patient understood.

## 2024-07-29 ENCOUNTER — OFFICE VISIT (OUTPATIENT)
Facility: CLINIC | Age: 49
End: 2024-07-29
Payer: COMMERCIAL

## 2024-07-29 VITALS
RESPIRATION RATE: 18 BRPM | BODY MASS INDEX: 46.95 KG/M2 | HEIGHT: 64 IN | WEIGHT: 275 LBS | HEART RATE: 95 BPM | TEMPERATURE: 98 F | DIASTOLIC BLOOD PRESSURE: 70 MMHG | OXYGEN SATURATION: 97 % | SYSTOLIC BLOOD PRESSURE: 128 MMHG

## 2024-07-29 DIAGNOSIS — L30.4 INTERTRIGO: Primary | ICD-10-CM

## 2024-07-29 PROCEDURE — 99214 OFFICE O/P EST MOD 30 MIN: CPT | Performed by: FAMILY MEDICINE

## 2024-07-29 RX ORDER — CLOTRIMAZOLE AND BETAMETHASONE DIPROPIONATE 10; .64 MG/G; MG/G
CREAM TOPICAL
Qty: 15 G | Refills: 0 | Status: SHIPPED | OUTPATIENT
Start: 2024-07-29

## 2024-07-29 NOTE — PROGRESS NOTES
Systems  Rash    Physical Exam:  /70   Pulse 95   Temp 98 °F (36.7 °C) (Temporal)   Resp 18   Ht 1.626 m (5' 4\")   Wt 124.7 kg (275 lb)   LMP 07/18/2024   SpO2 97%   BMI 47.20 kg/m²   Physical Exam   Mild erythema in the pannus line  no drainage.  No papules or macules  Assessment/Plan:      or  1. Intertrigo  Recommend trying Lotrisone short-term for inflammation and for fungus.  Keep area dry is much as possible.  - clotrimazole-betamethasone (LOTRISONE) 1-0.05 % cream; Apply topically 2 times daily.  Dispense: 15 g; Refill: 0    #2.  Hypertension  Well-controlled with current medication hydrochlorothiazide    3.  Hyperlipidemia  Stable on Lipitor 10 mg daily.          Yary Alcaraz office note has been dictated.

## 2024-08-19 ENCOUNTER — OFFICE VISIT (OUTPATIENT)
Facility: CLINIC | Age: 49
End: 2024-08-19
Payer: COMMERCIAL

## 2024-08-19 VITALS
OXYGEN SATURATION: 98 % | DIASTOLIC BLOOD PRESSURE: 70 MMHG | TEMPERATURE: 97.8 F | RESPIRATION RATE: 20 BRPM | HEART RATE: 87 BPM | WEIGHT: 278 LBS | HEIGHT: 64 IN | SYSTOLIC BLOOD PRESSURE: 106 MMHG | BODY MASS INDEX: 47.46 KG/M2

## 2024-08-19 DIAGNOSIS — E07.9 THYROID DISORDER: ICD-10-CM

## 2024-08-19 DIAGNOSIS — I10 ESSENTIAL (PRIMARY) HYPERTENSION: Primary | ICD-10-CM

## 2024-08-19 DIAGNOSIS — I10 ESSENTIAL (PRIMARY) HYPERTENSION: ICD-10-CM

## 2024-08-19 DIAGNOSIS — R61 SWEATING INCREASE: ICD-10-CM

## 2024-08-19 DIAGNOSIS — D64.9 ANEMIA, UNSPECIFIED TYPE: ICD-10-CM

## 2024-08-19 DIAGNOSIS — E78.5 HYPERLIPIDEMIA, UNSPECIFIED HYPERLIPIDEMIA TYPE: ICD-10-CM

## 2024-08-19 PROCEDURE — 3074F SYST BP LT 130 MM HG: CPT | Performed by: FAMILY MEDICINE

## 2024-08-19 PROCEDURE — 3078F DIAST BP <80 MM HG: CPT | Performed by: FAMILY MEDICINE

## 2024-08-19 PROCEDURE — 99214 OFFICE O/P EST MOD 30 MIN: CPT | Performed by: FAMILY MEDICINE

## 2024-08-19 RX ORDER — ATORVASTATIN CALCIUM 10 MG/1
10 TABLET, FILM COATED ORAL DAILY
Qty: 90 TABLET | Refills: 1 | Status: SHIPPED | OUTPATIENT
Start: 2024-08-19

## 2024-08-19 NOTE — PROGRESS NOTES
1. \"Have you been to the ER, urgent care clinic since your last visit?  Hospitalized since your last visit?\"No    2. \"Have you seen or consulted any other health care providers outside of the [facility] since your last visit?\" No    3. For patients aged 45-75: Has the patient had a colonoscopy / FIT/ Cologuard? Yes      If the patient is female:    4. For patients aged 40-74: Has the patient had a mammogram within the past 2 years? Yes      5. For patients aged 21-65: Has the patient had a pap smear? Yes  
all   Food Insecurity: No Food Insecurity (7/18/2024)    Hunger Vital Sign     Worried About Running Out of Food in the Last Year: Never true     Ran Out of Food in the Last Year: Never true   Transportation Needs: Unknown (7/18/2024)    PRAPARE - Transportation     Lack of Transportation (Medical): Not on file     Lack of Transportation (Non-Medical): No   Physical Activity: Not on file   Stress: Not on file   Social Connections: Not on file   Intimate Partner Violence: Not on file   Housing Stability: Unknown (7/18/2024)    Housing Stability Vital Sign     Unable to Pay for Housing in the Last Year: Not on file     Number of Places Lived in the Last Year: Not on file     Unstable Housing in the Last Year: No       Review of Systems  Review of Systems - Review of all systems is negative except as noted above in the HPI.    Vital Signs  /70   Pulse 87   Temp 97.8 °F (36.6 °C)   Resp 20   Ht 1.626 m (5' 4\")   Wt 126.1 kg (278 lb)   LMP 08/19/2024   SpO2 98%   BMI 47.72 kg/m²       Physical Exam  Physical Examination: General appearance - oriented to person, place, and time, overweight, and acyanotic, in no respiratory distress  Mental status - affect appropriate to mood  Neck - supple, no significant adenopathy  Lymphatics - no palpable lymphadenopathy  Chest - no tachypnea, retractions or cyanosis  Heart - S1 and S2 normal  Abdomen - no rebound tenderness noted  Back exam - limited range of motion  Neurological - abnormal neurological exam unchanged from prior examinations  Musculoskeletal - no muscular tenderness noted  Extremities - intact peripheral pulses      Results  No results found for this visit on 08/19/24.    ASSESSMENT and PLAN    ICD-10-CM    1. Essential (primary) hypertension  I10 Comprehensive Metabolic Panel      2. Sweating increase  R61 TSH + Free T4 Panel      3. Anemia, unspecified type  D64.9 CBC with Auto Differential     Iron and TIBC     Ferritin      4. Thyroid disorder  E07.9

## 2024-09-14 LAB
A/G RATIO: 1.4 RATIO (ref 1.1–2.6)
ALBUMIN: 4.4 G/DL (ref 3.5–5)
ALP BLD-CCNC: 88 U/L (ref 25–115)
ALT SERPL-CCNC: 9 U/L (ref 5–40)
ANION GAP SERPL CALCULATED.3IONS-SCNC: 10 MMOL/L (ref 3–15)
AST SERPL-CCNC: 12 U/L (ref 10–37)
BASOPHILS ABSOLUTE: 0 K/UL (ref 0–0.2)
BASOPHILS RELATIVE PERCENT: 1 % (ref 0–2)
BILIRUB SERPL-MCNC: 0.8 MG/DL (ref 0.2–1.2)
BUN BLDV-MCNC: 8 MG/DL (ref 6–22)
CALCIUM SERPL-MCNC: 9.4 MG/DL (ref 8.4–10.5)
CHLORIDE BLD-SCNC: 98 MMOL/L (ref 98–110)
CO2: 28 MMOL/L (ref 20–32)
CREAT SERPL-MCNC: 0.8 MG/DL (ref 0.5–1.2)
EOSINOPHIL # BLD: 4 % (ref 0–6)
EOSINOPHILS ABSOLUTE: 0.2 K/UL (ref 0–0.5)
FERRITIN: 148 NG/ML (ref 10–291)
GFR, ESTIMATED: >60 ML/MIN/1.73 SQ.M.
GLOBULIN: 3.1 G/DL (ref 2–4)
GLUCOSE: 106 MG/DL (ref 70–99)
HCT VFR BLD CALC: 36.8 % (ref 35.1–48)
HEMOGLOBIN: 11.4 G/DL (ref 11.7–16)
IRON % SATURATION: 18 % (ref 20–50)
IRON: 51 MCG/DL (ref 30–160)
LYMPHOCYTES # BLD: 38 % (ref 20–45)
LYMPHOCYTES ABSOLUTE: 2.3 K/UL (ref 1–4.8)
MCH RBC QN AUTO: 24 PG (ref 26–34)
MCHC RBC AUTO-ENTMCNC: 31 G/DL (ref 31–36)
MCV RBC AUTO: 78 FL (ref 80–99)
MONOCYTES ABSOLUTE: 0.5 K/UL (ref 0.1–1)
MONOCYTES: 8 % (ref 3–12)
NEUTROPHILS ABSOLUTE: 3 K/UL (ref 1.8–7.7)
NEUTROPHILS: 49 % (ref 40–75)
PDW BLD-RTO: 15.9 % (ref 10–15.5)
PLATELET # BLD: 434 K/UL (ref 140–440)
PMV BLD AUTO: 8.9 FL (ref 9–13)
POTASSIUM SERPL-SCNC: 3.3 MMOL/L (ref 3.5–5.5)
RBC # BLD: 4.7 M/UL (ref 3.8–5.2)
SODIUM BLD-SCNC: 136 MMOL/L (ref 133–145)
T4 FREE: 1.5 NG/DL (ref 0.9–1.8)
TOTAL IRON BINDING CAPACITY: 289 MCG/DL (ref 228–428)
TOTAL PROTEIN: 7.5 G/DL (ref 6.4–8.3)
UIBC: 238 MCG/DL (ref 110–370)
WBC # BLD: 6.1 K/UL (ref 4–11)

## 2024-09-15 DIAGNOSIS — D64.9 CHRONIC ANEMIA: Primary | ICD-10-CM

## 2024-09-15 DIAGNOSIS — I10 ESSENTIAL (PRIMARY) HYPERTENSION: ICD-10-CM

## 2024-09-15 RX ORDER — POTASSIUM CHLORIDE 750 MG/1
10 TABLET, EXTENDED RELEASE ORAL 2 TIMES DAILY
Qty: 180 TABLET | Refills: 1 | Status: SHIPPED | OUTPATIENT
Start: 2024-09-15

## 2024-09-23 ENCOUNTER — OFFICE VISIT (OUTPATIENT)
Facility: CLINIC | Age: 49
End: 2024-09-23
Payer: COMMERCIAL

## 2024-09-23 VITALS
DIASTOLIC BLOOD PRESSURE: 71 MMHG | TEMPERATURE: 97.8 F | OXYGEN SATURATION: 94 % | WEIGHT: 275 LBS | HEIGHT: 64 IN | HEART RATE: 74 BPM | SYSTOLIC BLOOD PRESSURE: 109 MMHG | BODY MASS INDEX: 46.95 KG/M2 | RESPIRATION RATE: 20 BRPM

## 2024-09-23 DIAGNOSIS — T78.40XS ALLERGY, SEQUELA: ICD-10-CM

## 2024-09-23 DIAGNOSIS — I10 ESSENTIAL (PRIMARY) HYPERTENSION: Primary | ICD-10-CM

## 2024-09-23 DIAGNOSIS — F39 MOOD DISORDER (HCC): ICD-10-CM

## 2024-09-23 DIAGNOSIS — N91.2 AMENORRHEA: ICD-10-CM

## 2024-09-23 DIAGNOSIS — D64.9 CHRONIC ANEMIA: ICD-10-CM

## 2024-09-23 DIAGNOSIS — E78.5 HYPERLIPIDEMIA, UNSPECIFIED HYPERLIPIDEMIA TYPE: ICD-10-CM

## 2024-09-23 DIAGNOSIS — E66.01 MORBID OBESITY: ICD-10-CM

## 2024-09-23 LAB
HCG, PREGNANCY, URINE, POC: NEGATIVE
VALID INTERNAL CONTROL, POC: YES

## 2024-09-23 PROCEDURE — 99214 OFFICE O/P EST MOD 30 MIN: CPT | Performed by: FAMILY MEDICINE

## 2024-09-23 PROCEDURE — 81025 URINE PREGNANCY TEST: CPT | Performed by: FAMILY MEDICINE

## 2024-09-23 PROCEDURE — 3074F SYST BP LT 130 MM HG: CPT | Performed by: FAMILY MEDICINE

## 2024-09-23 PROCEDURE — 3078F DIAST BP <80 MM HG: CPT | Performed by: FAMILY MEDICINE

## 2024-09-23 RX ORDER — LABETALOL 300 MG/1
TABLET, FILM COATED ORAL
COMMUNITY

## 2024-09-23 RX ORDER — POTASSIUM CHLORIDE 750 MG/1
10 TABLET, EXTENDED RELEASE ORAL 2 TIMES DAILY
Qty: 180 TABLET | Refills: 1 | Status: SHIPPED | OUTPATIENT
Start: 2024-09-23

## 2024-10-03 RX ORDER — HYDROCHLOROTHIAZIDE 25 MG/1
TABLET ORAL
Qty: 90 TABLET | Refills: 1 | Status: SHIPPED | OUTPATIENT
Start: 2024-10-03

## 2024-10-04 ENCOUNTER — TELEPHONE (OUTPATIENT)
Facility: CLINIC | Age: 49
End: 2024-10-04

## 2024-10-04 NOTE — TELEPHONE ENCOUNTER
Pt called in today to have the following medication refilled:   ARIPiprazole (ABILIFY) 15 MG tablet . Please advise pt if have any additional information.

## 2024-10-08 ENCOUNTER — OFFICE VISIT (OUTPATIENT)
Facility: CLINIC | Age: 49
End: 2024-10-08
Payer: COMMERCIAL

## 2024-10-08 DIAGNOSIS — Z23 FLU VACCINE NEED: Primary | ICD-10-CM

## 2024-10-08 PROCEDURE — 90661 CCIIV3 VAC ABX FR 0.5 ML IM: CPT | Performed by: FAMILY MEDICINE

## 2024-10-08 PROCEDURE — 90471 IMMUNIZATION ADMIN: CPT | Performed by: FAMILY MEDICINE

## 2024-10-30 ENCOUNTER — TELEPHONE (OUTPATIENT)
Facility: CLINIC | Age: 49
End: 2024-10-30

## 2024-11-07 DIAGNOSIS — E78.5 HYPERLIPIDEMIA, UNSPECIFIED HYPERLIPIDEMIA TYPE: ICD-10-CM

## 2024-11-07 NOTE — TELEPHONE ENCOUNTER
Patient was last seen on 9-    Per last office note patient is being followed by [facility] (Abilify refill)    Atorvastatin was last prescribed on 8-  #90 X 1

## 2024-11-07 NOTE — TELEPHONE ENCOUNTER
ARIPiprazole (ABILIFY) 15 MG tablet   atorvastatin (LIPITOR) 10 MG tablet   levocetirizine (XYZAL) 5 MG tablet  [facility] [place], [place].   homeless, cocaine use, etoh use diabetes awake and alert, agitated and uncooperative. 1:1 at bedside.

## 2024-11-10 RX ORDER — LEVOCETIRIZINE DIHYDROCHLORIDE 5 MG/1
5 TABLET, FILM COATED ORAL NIGHTLY
Qty: 90 TABLET | Refills: 1 | Status: SHIPPED | OUTPATIENT
Start: 2024-11-10

## 2024-11-10 RX ORDER — ATORVASTATIN CALCIUM 10 MG/1
10 TABLET, FILM COATED ORAL DAILY
Qty: 90 TABLET | Refills: 1 | Status: SHIPPED | OUTPATIENT
Start: 2024-11-10

## 2024-12-23 ENCOUNTER — OFFICE VISIT (OUTPATIENT)
Facility: CLINIC | Age: 49
End: 2024-12-23
Payer: COMMERCIAL

## 2024-12-23 VITALS
WEIGHT: 280 LBS | TEMPERATURE: 98.1 F | DIASTOLIC BLOOD PRESSURE: 75 MMHG | SYSTOLIC BLOOD PRESSURE: 119 MMHG | HEIGHT: 65 IN | HEART RATE: 80 BPM | BODY MASS INDEX: 46.65 KG/M2 | OXYGEN SATURATION: 98 % | RESPIRATION RATE: 20 BRPM

## 2024-12-23 DIAGNOSIS — I10 ESSENTIAL (PRIMARY) HYPERTENSION: ICD-10-CM

## 2024-12-23 DIAGNOSIS — I10 ESSENTIAL (PRIMARY) HYPERTENSION: Primary | ICD-10-CM

## 2024-12-23 DIAGNOSIS — R73.03 PREDIABETES: ICD-10-CM

## 2024-12-23 DIAGNOSIS — E78.5 HYPERLIPIDEMIA, UNSPECIFIED HYPERLIPIDEMIA TYPE: ICD-10-CM

## 2024-12-23 DIAGNOSIS — F31.9 BIPOLAR AFFECTIVE DISORDER, REMISSION STATUS UNSPECIFIED (HCC): ICD-10-CM

## 2024-12-23 DIAGNOSIS — D64.9 CHRONIC ANEMIA: ICD-10-CM

## 2024-12-23 DIAGNOSIS — E87.6 HYPOKALEMIA: ICD-10-CM

## 2024-12-23 DIAGNOSIS — N89.8 VAGINAL DISCHARGE: ICD-10-CM

## 2024-12-23 DIAGNOSIS — E61.1 IRON DEFICIENCY: ICD-10-CM

## 2024-12-23 DIAGNOSIS — F39 MOOD DISORDER (HCC): ICD-10-CM

## 2024-12-23 DIAGNOSIS — T78.40XD ALLERGY, SUBSEQUENT ENCOUNTER: ICD-10-CM

## 2024-12-23 DIAGNOSIS — M79.605 PAIN OF LEFT LOWER EXTREMITY: ICD-10-CM

## 2024-12-23 DIAGNOSIS — R30.0 DYSURIA: ICD-10-CM

## 2024-12-23 LAB
BILIRUBIN, URINE, POC: NEGATIVE
BLOOD URINE, POC: NORMAL
GLUCOSE URINE, POC: NEGATIVE
KETONES, URINE, POC: NEGATIVE
LEUKOCYTE ESTERASE, URINE, POC: NEGATIVE
NITRITE, URINE, POC: NEGATIVE
PH, URINE, POC: 6 (ref 4.6–8)
PROTEIN,URINE, POC: NEGATIVE
SPECIFIC GRAVITY, URINE, POC: 1.02 (ref 1–1.03)
URINALYSIS CLARITY, POC: CLEAR
URINALYSIS COLOR, POC: YELLOW
UROBILINOGEN, POC: NORMAL

## 2024-12-23 PROCEDURE — 99215 OFFICE O/P EST HI 40 MIN: CPT | Performed by: FAMILY MEDICINE

## 2024-12-23 PROCEDURE — 3078F DIAST BP <80 MM HG: CPT | Performed by: FAMILY MEDICINE

## 2024-12-23 PROCEDURE — 3074F SYST BP LT 130 MM HG: CPT | Performed by: FAMILY MEDICINE

## 2024-12-23 PROCEDURE — 81003 URINALYSIS AUTO W/O SCOPE: CPT | Performed by: FAMILY MEDICINE

## 2024-12-23 RX ORDER — LORATADINE 10 MG/1
10 TABLET ORAL DAILY
Qty: 90 TABLET | Refills: 1 | Status: SHIPPED | OUTPATIENT
Start: 2024-12-23

## 2024-12-23 RX ORDER — POTASSIUM CHLORIDE 750 MG/1
10 TABLET, EXTENDED RELEASE ORAL 2 TIMES DAILY
Qty: 180 TABLET | Refills: 1 | Status: SHIPPED | OUTPATIENT
Start: 2024-12-23

## 2024-12-23 NOTE — PROGRESS NOTES
\"Have you been to the ER, urgent care clinic since your last visit?  Hospitalized since your last visit?\"    NO    “Have you seen or consulted any other health care providers outside our system since your last visit?”    NO           
  Tobacco Use    Smoking status: Never    Smokeless tobacco: Never   Substance and Sexual Activity    Alcohol use: No     Alcohol/week: 0.0 standard drinks of alcohol    Drug use: No    Sexual activity: Not on file   Other Topics Concern    Not on file   Social History Narrative    Not on file     Social Determinants of Health     Financial Resource Strain: Low Risk  (7/18/2024)    Overall Financial Resource Strain (CARDIA)     Difficulty of Paying Living Expenses: Not hard at all   Food Insecurity: No Food Insecurity (7/18/2024)    Hunger Vital Sign     Worried About Running Out of Food in the Last Year: Never true     Ran Out of Food in the Last Year: Never true   Transportation Needs: Unknown (7/18/2024)    PRAPARE - Transportation     Lack of Transportation (Medical): Not on file     Lack of Transportation (Non-Medical): No   Physical Activity: Not on file   Stress: Not on file   Social Connections: Not on file   Intimate Partner Violence: Not on file   Housing Stability: Unknown (7/18/2024)    Housing Stability Vital Sign     Unable to Pay for Housing in the Last Year: Not on file     Number of Places Lived in the Last Year: Not on file     Unstable Housing in the Last Year: No       Review of Systems  Review of Systems - Review of all systems is negative except as noted above in the HPI.    Vital Signs  /75   Pulse 80   Temp 98.1 °F (36.7 °C)   Resp 20   Ht 1.651 m (5' 5\")   Wt 127 kg (280 lb)   LMP 12/07/2024   SpO2 98%   BMI 46.59 kg/m²       Physical Exam  Physical Examination: General appearance - oriented to person, place, and time, overweight, and acyanotic, in no respiratory distress  Mental status - affect appropriate to mood  Neck - supple, no significant adenopathy  Lymphatics - no palpable lymphadenopathy  Chest - clear to auscultation, no wheezes, rales or rhonchi, symmetric air entry  Heart - S1 and S2 normal  Abdomen - no rebound tenderness noted  Back exam - limited range of

## 2024-12-25 LAB
BACTERIA: PRESENT
BILIRUBIN, URINE: NEGATIVE
BLOOD: NEGATIVE
CLARITY, UA: CLEAR
COLOR, UA: YELLOW
EPITHELIAL CELLS: ABNORMAL /HPF
GLUCOSE URINE: NEGATIVE MG/DL
HYALINE CASTS: ABNORMAL /LPF (ref 0–2)
KETONES, URINE: NEGATIVE MG/DL
LEUKOCYTE ESTERASE, URINE: NEGATIVE
NITRITE, URINE: NEGATIVE
PH, URINE: 6 PH (ref 5–8)
PROTEIN UA: NEGATIVE MG/DL
RBC URINE: ABNORMAL /HPF
SPECIFIC GRAVITY UA: 1.02 (ref 1–1.03)
UROBILINOGEN, URINE: 0.2 MG/DL
WBC UA: ABNORMAL /HPF (ref 0–5)

## 2024-12-26 LAB
BACTERIAL VAGINOSIS, NAA: POSITIVE
C TRACH DNA SPEC NAA+PROBE: NEGATIVE
CANDIDA GLABRATA, NAA: NEGATIVE
CANDIDA SPECIES, NAA: NEGATIVE
NEISSERIA GONORRHOEAE, NAA: NEGATIVE
TRICHOMONAS VAGINALIS BY NAA: NEGATIVE

## 2024-12-30 ENCOUNTER — TELEPHONE (OUTPATIENT)
Facility: CLINIC | Age: 49
End: 2024-12-30

## 2025-01-03 ENCOUNTER — TELEPHONE (OUTPATIENT)
Facility: CLINIC | Age: 50
End: 2025-01-03

## 2025-01-03 NOTE — TELEPHONE ENCOUNTER
Pt calling because she got her lab results back, and she has a UTI. She wants to know can she get an antibiotic called in to her pharmacy [facility] [address] [place], [place]. Please advise. Thank you!!!

## 2025-01-07 RX ORDER — CIPROFLOXACIN 500 MG/1
500 TABLET, FILM COATED ORAL 2 TIMES DAILY
Qty: 6 TABLET | Refills: 0 | Status: SHIPPED | OUTPATIENT
Start: 2025-01-07 | End: 2025-01-10

## 2025-01-07 RX ORDER — METRONIDAZOLE 500 MG/1
500 TABLET ORAL 2 TIMES DAILY
Qty: 14 TABLET | Refills: 0 | Status: SHIPPED | OUTPATIENT
Start: 2025-01-07 | End: 2025-01-14

## 2025-01-07 NOTE — TELEPHONE ENCOUNTER
Called pt and verified pt by name and .  Advised pt of her results per Dr. Pro.  Pt states that she has no questions or concerns.

## 2025-01-20 ENCOUNTER — HOSPITAL ENCOUNTER (OUTPATIENT)
Facility: HOSPITAL | Age: 50
Setting detail: SPECIMEN
Discharge: HOME | End: 2025-01-23

## 2025-01-20 ENCOUNTER — HOSPITAL ENCOUNTER (OUTPATIENT)
Facility: HOSPITAL | Age: 50
Discharge: HOME | End: 2025-01-22
Payer: COMMERCIAL

## 2025-01-20 DIAGNOSIS — M79.605 PAIN OF LEFT LOWER EXTREMITY: ICD-10-CM

## 2025-01-20 LAB
A/G RATIO: 1.5 RATIO (ref 1.1–2.6)
ALBUMIN: 4.4 G/DL (ref 3.5–5)
ALP BLD-CCNC: 97 U/L (ref 25–115)
ALT SERPL-CCNC: 10 U/L (ref 5–40)
ANION GAP SERPL CALCULATED.3IONS-SCNC: 11 MMOL/L (ref 3–15)
AST SERPL-CCNC: 14 U/L (ref 10–37)
BASOPHILS # BLD: 1 % (ref 0–2)
BASOPHILS ABSOLUTE: 0 K/UL (ref 0–0.2)
BILIRUB SERPL-MCNC: 0.7 MG/DL (ref 0.2–1.2)
BUN BLDV-MCNC: 10 MG/DL (ref 6–22)
CALCIUM SERPL-MCNC: 9.7 MG/DL (ref 8.4–10.5)
CHLORIDE BLD-SCNC: 98 MMOL/L (ref 98–110)
CHOLESTEROL, TOTAL: 123 MG/DL (ref 110–200)
CHOLESTEROL/HDL RATIO: 2.9 (ref 0–5)
CO2: 30 MMOL/L (ref 20–32)
CREAT SERPL-MCNC: 0.9 MG/DL (ref 0.5–1.2)
EOSINOPHIL # BLD: 2 % (ref 0–6)
EOSINOPHILS ABSOLUTE: 0.2 K/UL (ref 0–0.5)
ESTIMATED AVERAGE GLUCOSE: 113 MG/DL (ref 91–123)
GFR, ESTIMATED: >60 ML/MIN/1.73 SQ.M.
GLOBULIN: 3 G/DL (ref 2–4)
GLUCOSE: 102 MG/DL (ref 70–99)
HBA1C MFR BLD: 5.6 % (ref 4.8–5.6)
HCT VFR BLD CALC: 38.1 % (ref 35.1–48)
HDLC SERPL-MCNC: 42 MG/DL
HEMOGLOBIN: 12.1 G/DL (ref 11.7–16)
LDL CHOLESTEROL: 63 MG/DL (ref 50–99)
LDL/HDL RATIO: 1.5
LYMPHOCYTES # BLD: 30 % (ref 20–45)
LYMPHOCYTES ABSOLUTE: 2 K/UL (ref 1–4.8)
MCH RBC QN AUTO: 26 PG (ref 26–34)
MCHC RBC AUTO-ENTMCNC: 32 G/DL (ref 31–36)
MCV RBC AUTO: 80 FL (ref 80–99)
MONOCYTES ABSOLUTE: 0.3 K/UL (ref 0.1–1)
MONOCYTES: 5 % (ref 3–12)
NEUTROPHILS ABSOLUTE: 4 K/UL (ref 1.8–7.7)
NEUTROPHILS SEGMENTED: 62 % (ref 40–75)
NON-HDL CHOLESTEROL: 81 MG/DL
PDW BLD-RTO: 13.7 % (ref 10–15.5)
PLATELET # BLD: 461 K/UL (ref 140–440)
PMV BLD AUTO: 8.9 FL (ref 9–13)
POTASSIUM SERPL-SCNC: 3.8 MMOL/L (ref 3.5–5.5)
RBC # BLD: 4.75 M/UL (ref 3.8–5.2)
SENTARA SPECIMEN COLLECTION: NORMAL
SODIUM BLD-SCNC: 139 MMOL/L (ref 133–145)
TOTAL PROTEIN: 7.4 G/DL (ref 6.4–8.3)
TRIGL SERPL-MCNC: 89 MG/DL (ref 40–149)
VLDLC SERPL CALC-MCNC: 18 MG/DL (ref 8–30)
WBC # BLD: 6.4 K/UL (ref 4–11)

## 2025-01-20 PROCEDURE — 93971 EXTREMITY STUDY: CPT

## 2025-01-20 PROCEDURE — 93971 EXTREMITY STUDY: CPT | Performed by: INTERNAL MEDICINE

## 2025-01-20 PROCEDURE — 99001 SPECIMEN HANDLING PT-LAB: CPT

## 2025-02-07 DIAGNOSIS — I10 ESSENTIAL (PRIMARY) HYPERTENSION: ICD-10-CM

## 2025-02-10 RX ORDER — AMLODIPINE BESYLATE 10 MG/1
10 TABLET ORAL DAILY
Qty: 90 TABLET | Refills: 1 | Status: SHIPPED | OUTPATIENT
Start: 2025-02-10 | End: 2025-02-13 | Stop reason: SDUPTHER

## 2025-02-10 NOTE — TELEPHONE ENCOUNTER
Last seen 12/23/2024   Last labs 1/20/2025  Last filled  5/14/2024  Next appointment 2/13/2025     Lab Results   Component Value Date     01/20/2025    K 3.8 01/20/2025    CL 98 01/20/2025    CO2 30 01/20/2025    BUN 10 01/20/2025    CREATININE 0.9 01/20/2025    GLUCOSE 102 (H) 01/20/2025    CALCIUM 9.7 01/20/2025    BILITOT 0.7 01/20/2025    ALKPHOS 97 01/20/2025    AST 14 01/20/2025    ALT 10 01/20/2025    LABGLOM >60.0 01/20/2025    GFRAA >60.0 09/22/2021    AGRATIO 1.5 01/20/2025    GLOB 3.0 01/20/2025

## 2025-02-13 ENCOUNTER — OFFICE VISIT (OUTPATIENT)
Facility: CLINIC | Age: 50
End: 2025-02-13
Payer: COMMERCIAL

## 2025-02-13 VITALS
BODY MASS INDEX: 47.48 KG/M2 | SYSTOLIC BLOOD PRESSURE: 128 MMHG | DIASTOLIC BLOOD PRESSURE: 76 MMHG | OXYGEN SATURATION: 95 % | HEIGHT: 65 IN | RESPIRATION RATE: 20 BRPM | WEIGHT: 285 LBS | HEART RATE: 85 BPM | TEMPERATURE: 98.1 F

## 2025-02-13 DIAGNOSIS — E78.5 HYPERLIPIDEMIA, UNSPECIFIED HYPERLIPIDEMIA TYPE: ICD-10-CM

## 2025-02-13 DIAGNOSIS — E61.1 IRON DEFICIENCY: ICD-10-CM

## 2025-02-13 DIAGNOSIS — I10 ESSENTIAL (PRIMARY) HYPERTENSION: Primary | ICD-10-CM

## 2025-02-13 DIAGNOSIS — E87.6 HYPOKALEMIA: ICD-10-CM

## 2025-02-13 DIAGNOSIS — F39 MOOD DISORDER (HCC): ICD-10-CM

## 2025-02-13 PROBLEM — D50.0 ANEMIA DUE TO CHRONIC BLOOD LOSS: Status: ACTIVE | Noted: 2025-02-13

## 2025-02-13 PROCEDURE — 3074F SYST BP LT 130 MM HG: CPT | Performed by: FAMILY MEDICINE

## 2025-02-13 PROCEDURE — 99214 OFFICE O/P EST MOD 30 MIN: CPT | Performed by: FAMILY MEDICINE

## 2025-02-13 PROCEDURE — 3078F DIAST BP <80 MM HG: CPT | Performed by: FAMILY MEDICINE

## 2025-02-13 RX ORDER — AMLODIPINE BESYLATE 10 MG/1
10 TABLET ORAL DAILY
Qty: 90 TABLET | Refills: 1 | Status: SHIPPED | OUTPATIENT
Start: 2025-02-13

## 2025-02-13 RX ORDER — FERROUS SULFATE 325(65) MG
325 TABLET ORAL
Qty: 90 TABLET | Refills: 1 | Status: SHIPPED | OUTPATIENT
Start: 2025-02-13

## 2025-02-13 RX ORDER — POTASSIUM CHLORIDE 750 MG/1
10 TABLET, EXTENDED RELEASE ORAL DAILY
Qty: 90 TABLET | Refills: 1 | Status: SHIPPED | OUTPATIENT
Start: 2025-02-13

## 2025-02-13 SDOH — ECONOMIC STABILITY: FOOD INSECURITY: WITHIN THE PAST 12 MONTHS, YOU WORRIED THAT YOUR FOOD WOULD RUN OUT BEFORE YOU GOT MONEY TO BUY MORE.: NEVER TRUE

## 2025-02-13 SDOH — ECONOMIC STABILITY: FOOD INSECURITY: WITHIN THE PAST 12 MONTHS, THE FOOD YOU BOUGHT JUST DIDN'T LAST AND YOU DIDN'T HAVE MONEY TO GET MORE.: NEVER TRUE

## 2025-02-13 ASSESSMENT — PATIENT HEALTH QUESTIONNAIRE - PHQ9
8. MOVING OR SPEAKING SO SLOWLY THAT OTHER PEOPLE COULD HAVE NOTICED. OR THE OPPOSITE, BEING SO FIGETY OR RESTLESS THAT YOU HAVE BEEN MOVING AROUND A LOT MORE THAN USUAL: NOT AT ALL
SUM OF ALL RESPONSES TO PHQ QUESTIONS 1-9: 0
2. FEELING DOWN, DEPRESSED OR HOPELESS: NOT AT ALL
SUM OF ALL RESPONSES TO PHQ QUESTIONS 1-9: 0
10. IF YOU CHECKED OFF ANY PROBLEMS, HOW DIFFICULT HAVE THESE PROBLEMS MADE IT FOR YOU TO DO YOUR WORK, TAKE CARE OF THINGS AT HOME, OR GET ALONG WITH OTHER PEOPLE: NOT DIFFICULT AT ALL
6. FEELING BAD ABOUT YOURSELF - OR THAT YOU ARE A FAILURE OR HAVE LET YOURSELF OR YOUR FAMILY DOWN: NOT AT ALL
SUM OF ALL RESPONSES TO PHQ QUESTIONS 1-9: 0
SUM OF ALL RESPONSES TO PHQ QUESTIONS 1-9: 0
SUM OF ALL RESPONSES TO PHQ9 QUESTIONS 1 & 2: 0
SUM OF ALL RESPONSES TO PHQ QUESTIONS 1-9: 0
SUM OF ALL RESPONSES TO PHQ QUESTIONS 1-9: 0
5. POOR APPETITE OR OVEREATING: NOT AT ALL
1. LITTLE INTEREST OR PLEASURE IN DOING THINGS: NOT AT ALL
9. THOUGHTS THAT YOU WOULD BE BETTER OFF DEAD, OR OF HURTING YOURSELF: NOT AT ALL
SUM OF ALL RESPONSES TO PHQ QUESTIONS 1-9: 0
7. TROUBLE CONCENTRATING ON THINGS, SUCH AS READING THE NEWSPAPER OR WATCHING TELEVISION: NOT AT ALL
4. FEELING TIRED OR HAVING LITTLE ENERGY: NOT AT ALL
1. LITTLE INTEREST OR PLEASURE IN DOING THINGS: NOT AT ALL
SUM OF ALL RESPONSES TO PHQ QUESTIONS 1-9: 0
SUM OF ALL RESPONSES TO PHQ9 QUESTIONS 1 & 2: 0
2. FEELING DOWN, DEPRESSED OR HOPELESS: NOT AT ALL

## 2025-02-13 NOTE — PROGRESS NOTES
\"Have you been to the ER, urgent care clinic since your last visit?  Hospitalized since your last visit?\"    NO    “Have you seen or consulted any other health care providers outside our system since your last visit?”    NO           
and specific lipid/LDL goals reviewed  reviewed medications and side effects in detail  radiology results and schedule of future radiology studies reviewed with patient      I have discussed the diagnosis with the patient and the intended plan of care as seen in the above orders. The patient has received an after-visit summary and questions were answered concerning future plans. I have discussed medication, side effects, and warnings with the patient in detail. The patient verbalized understanding and is in agreement with the plan of care. The patient will contact the office with any additional concerns.    Andrew Pablo MD    PLEASE NOTE:   This document has been produced using voice recognition software. Unrecognized errors in transcription may be present

## 2025-03-07 ENCOUNTER — TELEPHONE (OUTPATIENT)
Facility: CLINIC | Age: 50
End: 2025-03-07

## 2025-03-07 NOTE — TELEPHONE ENCOUNTER
Called patient to cechk her into appointment. Patient checked in and states she is ready for her visit.

## 2025-03-07 NOTE — TELEPHONE ENCOUNTER
Patient called and stated she didn't receive the link for the video visit. Let her know she will just go into the [facility] and click start video visit. Patient understood

## 2025-03-07 NOTE — TELEPHONE ENCOUNTER
Called patient back because it was 10 minutes past her appointment time and the video call was not yet active. Patient then states she doesn't have my chart, she will call me back and disconnected the line. Patient will have to reschedule.
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
## 2025-03-20 ENCOUNTER — HOSPITAL ENCOUNTER (OUTPATIENT)
Facility: HOSPITAL | Age: 50
Setting detail: SPECIMEN
Discharge: HOME | End: 2025-03-23

## 2025-03-20 ENCOUNTER — OFFICE VISIT (OUTPATIENT)
Facility: CLINIC | Age: 50
End: 2025-03-20
Payer: COMMERCIAL

## 2025-03-20 VITALS
HEIGHT: 65 IN | HEART RATE: 88 BPM | RESPIRATION RATE: 24 BRPM | DIASTOLIC BLOOD PRESSURE: 72 MMHG | OXYGEN SATURATION: 96 % | WEIGHT: 286 LBS | TEMPERATURE: 97.8 F | BODY MASS INDEX: 47.65 KG/M2 | SYSTOLIC BLOOD PRESSURE: 118 MMHG

## 2025-03-20 DIAGNOSIS — I10 ESSENTIAL (PRIMARY) HYPERTENSION: ICD-10-CM

## 2025-03-20 DIAGNOSIS — J32.4 PANSINUSITIS, UNSPECIFIED CHRONICITY: Primary | ICD-10-CM

## 2025-03-20 DIAGNOSIS — Z11.3 SCREEN FOR STD (SEXUALLY TRANSMITTED DISEASE): ICD-10-CM

## 2025-03-20 DIAGNOSIS — F39 MOOD DISORDER: ICD-10-CM

## 2025-03-20 PROCEDURE — 3078F DIAST BP <80 MM HG: CPT | Performed by: FAMILY MEDICINE

## 2025-03-20 PROCEDURE — 99213 OFFICE O/P EST LOW 20 MIN: CPT | Performed by: FAMILY MEDICINE

## 2025-03-20 PROCEDURE — 99001 SPECIMEN HANDLING PT-LAB: CPT

## 2025-03-20 PROCEDURE — 3074F SYST BP LT 130 MM HG: CPT | Performed by: FAMILY MEDICINE

## 2025-03-20 RX ORDER — FLUTICASONE PROPIONATE 50 MCG
2 SPRAY, SUSPENSION (ML) NASAL DAILY
Qty: 16 G | Refills: 0 | Status: SHIPPED | OUTPATIENT
Start: 2025-03-20

## 2025-03-20 NOTE — PROGRESS NOTES
Have you been to the ER, urgent care clinic since your last visit?  Hospitalized since your last visit?\"    NO    “Have you seen or consulted any other health care providers outside our system since your last visit?”               
disorder  F39       lab results and schedule of future lab studies reviewed with patient  reviewed diet, exercise and weight control  cardiovascular risk and specific lipid/LDL goals reviewed  reviewed medications and side effects in detail      I have discussed the diagnosis with the patient and the intended plan of care as seen in the above orders. The patient has received an after-visit summary and questions were answered concerning future plans. I have discussed medication, side effects, and warnings with the patient in detail. The patient verbalized understanding and is in agreement with the plan of care. The patient will contact the office with any additional concerns.    Andrew Pablo MD    PLEASE NOTE:   This document has been produced using voice recognition software. Unrecognized errors in transcription may be present

## 2025-03-21 ENCOUNTER — TELEMEDICINE (OUTPATIENT)
Facility: CLINIC | Age: 50
End: 2025-03-21

## 2025-03-21 DIAGNOSIS — Z02.9 ADMINISTRATIVE ENCOUNTER: Primary | ICD-10-CM

## 2025-03-21 LAB — SENTARA SPECIMEN COLLECTION: NORMAL

## 2025-03-21 SDOH — ECONOMIC STABILITY: FOOD INSECURITY: WITHIN THE PAST 12 MONTHS, YOU WORRIED THAT YOUR FOOD WOULD RUN OUT BEFORE YOU GOT MONEY TO BUY MORE.: NEVER TRUE

## 2025-03-21 SDOH — ECONOMIC STABILITY: FOOD INSECURITY: WITHIN THE PAST 12 MONTHS, THE FOOD YOU BOUGHT JUST DIDN'T LAST AND YOU DIDN'T HAVE MONEY TO GET MORE.: NEVER TRUE

## 2025-03-21 ASSESSMENT — PATIENT HEALTH QUESTIONNAIRE - PHQ9
4. FEELING TIRED OR HAVING LITTLE ENERGY: NOT AT ALL
9. THOUGHTS THAT YOU WOULD BE BETTER OFF DEAD, OR OF HURTING YOURSELF: NOT AT ALL
SUM OF ALL RESPONSES TO PHQ QUESTIONS 1-9: 0
10. IF YOU CHECKED OFF ANY PROBLEMS, HOW DIFFICULT HAVE THESE PROBLEMS MADE IT FOR YOU TO DO YOUR WORK, TAKE CARE OF THINGS AT HOME, OR GET ALONG WITH OTHER PEOPLE: NOT DIFFICULT AT ALL
3. TROUBLE FALLING OR STAYING ASLEEP: NOT AT ALL
SUM OF ALL RESPONSES TO PHQ QUESTIONS 1-9: 0
SUM OF ALL RESPONSES TO PHQ QUESTIONS 1-9: 0
5. POOR APPETITE OR OVEREATING: NOT AT ALL
6. FEELING BAD ABOUT YOURSELF - OR THAT YOU ARE A FAILURE OR HAVE LET YOURSELF OR YOUR FAMILY DOWN: NOT AT ALL
7. TROUBLE CONCENTRATING ON THINGS, SUCH AS READING THE NEWSPAPER OR WATCHING TELEVISION: NOT AT ALL
8. MOVING OR SPEAKING SO SLOWLY THAT OTHER PEOPLE COULD HAVE NOTICED. OR THE OPPOSITE, BEING SO FIGETY OR RESTLESS THAT YOU HAVE BEEN MOVING AROUND A LOT MORE THAN USUAL: NOT AT ALL
2. FEELING DOWN, DEPRESSED OR HOPELESS: NOT AT ALL
SUM OF ALL RESPONSES TO PHQ QUESTIONS 1-9: 0
1. LITTLE INTEREST OR PLEASURE IN DOING THINGS: NOT AT ALL

## 2025-03-24 NOTE — PROGRESS NOTES
3/21/2025    TELEHEALTH EVALUATION -- Audio/Visual    HPI:    Cristina Salazar (:  1975) has requested an audio/video evaluation for the following concern(s):    Patient needed a work note to return to work.  She had episodes of diarrhea but is feeling better.  Note for work completed.    Review of Systems   Gastrointestinal:  Positive for diarrhea.     Prior to Visit Medications    Medication Sig Taking? Authorizing Provider   fluticasone (FLONASE) 50 MCG/ACT nasal spray 2 sprays by Each Nostril route daily Yes Andrew Pablo MD   amoxicillin-clavulanate (AUGMENTIN) 875-125 MG per tablet Take 1 tablet by mouth 2 times daily for 10 days Yes Andrew Pablo MD   ferrous sulfate (IRON 325) 325 (65 Fe) MG tablet Take 1 tablet by mouth daily (with breakfast) Yes Andrew Pablo MD   potassium chloride (KLOR-CON M) 10 MEQ extended release tablet Take 1 tablet by mouth daily Yes Andrew Pablo MD   amLODIPine (NORVASC) 10 MG tablet Take 1 tablet by mouth daily Yes Andrew Pablo MD   loratadine (CLARITIN) 10 MG tablet Take 1 tablet by mouth daily Yes Andrew Pablo MD   levocetirizine (XYZAL) 5 MG tablet Take 1 tablet by mouth nightly Yes Andrew Pablo MD   atorvastatin (LIPITOR) 10 MG tablet Take 1 tablet by mouth daily Yes Andrew Pablo MD   hydroCHLOROthiazide (HYDRODIURIL) 25 MG tablet take 1 tablet by mouth once daily Yes Andrew Pablo MD   ibuprofen (ADVIL;MOTRIN) 600 MG tablet Take 1 tablet by mouth 3 times daily as needed for Pain Yes Andrew Pablo MD   Azelastine HCl 137 MCG/SPRAY SOLN instill 2 sprays into each nostril twice a day Yes ProviderAydee MD   ARIPiprazole (ABILIFY) 15 MG tablet Take 20 mg by mouth daily Yes Automatic Reconciliation, Ar       Social History     Tobacco Use    Smoking status: Never    Smokeless tobacco: Never   Substance Use Topics    Alcohol use: No     Alcohol/week: 0.0 standard drinks of alcohol    Drug use: No            PHYSICAL

## 2025-03-25 LAB — VAGINITIS PLUS, APTIMA SWAB: NORMAL

## 2025-03-25 ASSESSMENT — ENCOUNTER SYMPTOMS: DIARRHEA: 1

## 2025-04-10 RX ORDER — HYDROCHLOROTHIAZIDE 25 MG/1
25 TABLET ORAL DAILY
Qty: 90 TABLET | Refills: 1 | Status: SHIPPED | OUTPATIENT
Start: 2025-04-10

## 2025-04-10 NOTE — TELEPHONE ENCOUNTER
Last seen 3/20/2025   Last labs 1/20/2025  Last filled  10/3/2024  Next appointment 6/16/2025     Lab Results   Component Value Date     01/20/2025    K 3.8 01/20/2025    CL 98 01/20/2025    CO2 30 01/20/2025    BUN 10 01/20/2025    CREATININE 0.9 01/20/2025    GLUCOSE 102 (H) 01/20/2025    CALCIUM 9.7 01/20/2025    BILITOT 0.7 01/20/2025    ALKPHOS 97 01/20/2025    AST 14 01/20/2025    ALT 10 01/20/2025    LABGLOM >60.0 01/20/2025    GFRAA >60.0 09/22/2021    AGRATIO 1.5 01/20/2025    GLOB 3.0 01/20/2025

## 2025-04-16 ENCOUNTER — HOSPITAL ENCOUNTER (EMERGENCY)
Age: 50
Discharge: HOME | End: 2025-04-16
Attending: EMERGENCY MEDICINE
Payer: COMMERCIAL

## 2025-04-16 ENCOUNTER — TELEPHONE (OUTPATIENT)
Facility: CLINIC | Age: 50
End: 2025-04-16

## 2025-04-16 VITALS
SYSTOLIC BLOOD PRESSURE: 135 MMHG | BODY MASS INDEX: 47.65 KG/M2 | HEIGHT: 65 IN | WEIGHT: 286 LBS | HEART RATE: 92 BPM | DIASTOLIC BLOOD PRESSURE: 76 MMHG | RESPIRATION RATE: 16 BRPM | OXYGEN SATURATION: 97 % | TEMPERATURE: 98 F

## 2025-04-16 DIAGNOSIS — N92.0 MENORRHAGIA WITH REGULAR CYCLE: Primary | ICD-10-CM

## 2025-04-16 DIAGNOSIS — L72.0 CYST OF SKIN AND SUBCUTANEOUS TISSUE: ICD-10-CM

## 2025-04-16 DIAGNOSIS — N63.0 MASS OF BREAST, UNSPECIFIED LATERALITY: ICD-10-CM

## 2025-04-16 DIAGNOSIS — N93.9 VAGINAL BLEEDING: Primary | ICD-10-CM

## 2025-04-16 DIAGNOSIS — N92.0 MENORRHAGIA WITH REGULAR CYCLE: ICD-10-CM

## 2025-04-16 DIAGNOSIS — N92.4 PERIMENOPAUSAL MENORRHAGIA: ICD-10-CM

## 2025-04-16 LAB
ALBUMIN SERPL-MCNC: 3.6 G/DL (ref 3.4–5)
ALBUMIN/GLOB SERPL: 0.8 (ref 0.8–1.7)
ALP SERPL-CCNC: 88 U/L (ref 45–117)
ALT SERPL-CCNC: 21 U/L (ref 13–56)
ANION GAP SERPL CALC-SCNC: 8 MMOL/L (ref 3–18)
AST SERPL W P-5'-P-CCNC: 31 U/L (ref 10–38)
BASOPHILS # BLD: 0.04 K/UL (ref 0–0.1)
BASOPHILS NFR BLD: 0.4 % (ref 0–2)
BILIRUB SERPL-MCNC: 0.5 MG/DL (ref 0.2–1)
BUN SERPL-MCNC: 12 MG/DL (ref 7–18)
BUN/CREAT SERPL: 15 (ref 12–20)
CA-I BLD-MCNC: 9.2 MG/DL (ref 8.5–10.1)
CHLORIDE SERPL-SCNC: 100 MMOL/L (ref 100–111)
CO2 SERPL-SCNC: 30 MMOL/L (ref 21–32)
CREAT SERPL-MCNC: 0.8 MG/DL (ref 0.6–1.3)
DIFFERENTIAL METHOD BLD: ABNORMAL
EOSINOPHIL # BLD: 0.33 K/UL (ref 0–0.4)
EOSINOPHIL NFR BLD: 3.5 % (ref 0–5)
ERYTHROCYTE [DISTWIDTH] IN BLOOD BY AUTOMATED COUNT: 14.1 % (ref 11.6–14.5)
GLOBULIN SER CALC-MCNC: 4.4 G/DL (ref 2–4)
GLUCOSE SERPL-MCNC: 87 MG/DL (ref 74–99)
HCT VFR BLD AUTO: 33.7 % (ref 35–45)
HGB BLD-MCNC: 10.6 G/DL (ref 12–16)
IMM GRANULOCYTES # BLD AUTO: 0.03 K/UL (ref 0–0.04)
IMM GRANULOCYTES NFR BLD AUTO: 0.3 % (ref 0–0.5)
LYMPHOCYTES # BLD: 3.35 K/UL (ref 0.9–3.6)
LYMPHOCYTES NFR BLD: 35.2 % (ref 21–52)
MCH RBC QN AUTO: 24.3 PG (ref 24–34)
MCHC RBC AUTO-ENTMCNC: 31.5 G/DL (ref 31–37)
MCV RBC AUTO: 77.3 FL (ref 78–100)
MONOCYTES # BLD: 0.53 K/UL (ref 0.05–1.2)
MONOCYTES NFR BLD: 5.6 % (ref 3–10)
NEUTS SEG # BLD: 5.24 K/UL (ref 1.8–8)
NEUTS SEG NFR BLD: 55 % (ref 40–73)
NRBC # BLD: 0 K/UL (ref 0–0.01)
NRBC BLD-RTO: 0 PER 100 WBC
PLATELET # BLD AUTO: 459 K/UL (ref 135–420)
PMV BLD AUTO: 8.6 FL (ref 9.2–11.8)
POTASSIUM SERPL-SCNC: 3.7 MMOL/L (ref 3.5–5.5)
PROT SERPL-MCNC: 8 G/DL (ref 6.4–8.2)
RBC # BLD AUTO: 4.36 M/UL (ref 4.2–5.3)
SODIUM SERPL-SCNC: 138 MMOL/L (ref 136–145)
WBC # BLD AUTO: 9.5 K/UL (ref 4.6–13.2)

## 2025-04-16 PROCEDURE — 99284 EMERGENCY DEPT VISIT MOD MDM: CPT

## 2025-04-16 PROCEDURE — 6370000000 HC RX 637 (ALT 250 FOR IP): Performed by: EMERGENCY MEDICINE

## 2025-04-16 PROCEDURE — 2580000003 HC RX 258: Performed by: EMERGENCY MEDICINE

## 2025-04-16 PROCEDURE — 87591 N.GONORRHOEAE DNA AMP PROB: CPT

## 2025-04-16 PROCEDURE — 36415 COLL VENOUS BLD VENIPUNCTURE: CPT

## 2025-04-16 PROCEDURE — 80053 COMPREHEN METABOLIC PANEL: CPT

## 2025-04-16 PROCEDURE — 85025 COMPLETE CBC W/AUTO DIFF WBC: CPT

## 2025-04-16 PROCEDURE — 87491 CHLMYD TRACH DNA AMP PROBE: CPT

## 2025-04-16 RX ORDER — LIDOCAINE HYDROCHLORIDE AND EPINEPHRINE BITARTRATE 20; .01 MG/ML; MG/ML
20 INJECTION, SOLUTION SUBCUTANEOUS ONCE
Status: DISCONTINUED | OUTPATIENT
Start: 2025-04-16 | End: 2025-04-16

## 2025-04-16 RX ORDER — CEPHALEXIN 500 MG/1
500 CAPSULE ORAL 3 TIMES DAILY
Qty: 21 CAPSULE | Refills: 0 | Status: SHIPPED | OUTPATIENT
Start: 2025-04-16 | End: 2025-04-23

## 2025-04-16 RX ORDER — 0.9 % SODIUM CHLORIDE 0.9 %
1000 INTRAVENOUS SOLUTION INTRAVENOUS ONCE
Status: COMPLETED | OUTPATIENT
Start: 2025-04-16 | End: 2025-04-16

## 2025-04-16 RX ADMIN — CEPHALEXIN 500 MG: 250 CAPSULE ORAL at 19:51

## 2025-04-16 RX ADMIN — SODIUM CHLORIDE 1000 ML: 0.9 INJECTION, SOLUTION INTRAVENOUS at 19:52

## 2025-04-16 ASSESSMENT — ENCOUNTER SYMPTOMS
CHEST TIGHTNESS: 0
NAUSEA: 0
SINUS PRESSURE: 0
DIARRHEA: 0
COUGH: 0
SORE THROAT: 0
ABDOMINAL PAIN: 0
SHORTNESS OF BREATH: 0
RHINORRHEA: 0
VOMITING: 0

## 2025-04-16 ASSESSMENT — PAIN SCALES - GENERAL: PAINLEVEL_OUTOF10: 6

## 2025-04-16 ASSESSMENT — PAIN DESCRIPTION - ORIENTATION: ORIENTATION: LEFT

## 2025-04-16 ASSESSMENT — PAIN DESCRIPTION - LOCATION: LOCATION: BREAST

## 2025-04-16 ASSESSMENT — PAIN - FUNCTIONAL ASSESSMENT: PAIN_FUNCTIONAL_ASSESSMENT: 0-10

## 2025-04-16 NOTE — ED PROVIDER NOTES
EMERGENCY DEPARTMENT HISTORY AND PHYSICAL EXAM      Date: 2025  Patient Name: Cristina Salazar  Patient Age and Sex: 49 y.o. female     History of Presenting Illness     Chief Complaint   Patient presents with    Vaginal Bleeding    Breast Problem       History Provided By: Patient    HPI: Cristina Salazar patient have to the ER complaining of vaginal bleeding patient is 49 years old she is not sexually active she typically has every month..  She skipped it in January had a normal 1 in February and March she skipped it again in April she has had 14 days of bleeding.  It started out very light they got very heavy with clots and out started moderate down today.  Patient states that she has had no discharge, no pelvic pain.  Just heavy bleeding.  Patient states she has a history of anemia.  While she is here she also has a sore on her anterior chest she states that after chickenpox as a kid she had a scar on her left chest and over the last week or so she has noticed a getting sore tender and slightly larger.  So she wanted me to check that out while she is here.    There are no other complaints, changes, or physical findings at this time.    Past History     Past Medical History:  Past Medical History:   Diagnosis Date    Bipolar 1 disorder (HCC)     Borderline diabetes     Genital herpes     Hyperlipidemia     Hypertension        Past Surgical History:  Past Surgical History:   Procedure Laterality Date     SECTION      COLONOSCOPY N/A 2022    COLONOSCOPY/ Polypectomy performed by Hugh Brand MD at [facility] ENDOSCOPY       Family History:  Family History   Problem Relation Age of Onset    Diabetes Maternal Aunt     Diabetes Brother     Asthma Sister     Anemia Sister     Hypertension Father     Heart Surgery Father     Hypertension Mother     Heart Disease Father        Social History:  Social History     Tobacco Use    Smoking status: Never    Smokeless tobacco: Never   Substance Use Topics

## 2025-04-16 NOTE — ED TRIAGE NOTES
Pt states she has had an irregular cycle for the past 4-6 months   pt states she skipped January's cycle, had a normal cycle in February (2-12), skipped March and now she has had bleeding x 14 days    denies any cramping/clots, but states a couple of days have been heavy  Pt also has a \"mass\" on her upper left breast she wants checked out   states she had a chicken pox scar there since she was younger, but in the past week the area has gotten bigger and is hard and painful   pt states when she had her last mammogram, she was told to watch the area

## 2025-04-16 NOTE — TELEPHONE ENCOUNTER
----- Message from [place] TREMAYNE sent at 4/16/2025 11:00 AM EDT -----  Regarding: [facility] [place] [facility]  ECC Escalation To Practice      Type of Escalation: Red Flag Refusal  --------------------------------------------------------------------------------------------------------------------------    Information for Provider:Andrew Pablo MD    Patient is looking for appointment for: Symptom /heavy menstrual bleeding for 14 days, lump on her breast.  Reasons for Message: Unable to reach practice     Additional Information /the patient experiencing heavy menstrual bleeding for 14 days, lump on her breast.  --------------------------------------------------------------------------------------------------------------------------    Relationship to Patient: Self  Call Back Info: OK to leave message on voicemail  Preferred Call Back Number: Phone  [phone]

## 2025-04-18 LAB
C TRACH RRNA SPEC QL NAA+PROBE: NEGATIVE
N GONORRHOEA RRNA SPEC QL NAA+PROBE: NEGATIVE
SPECIMEN SOURCE: NORMAL

## 2025-04-21 ENCOUNTER — OFFICE VISIT (OUTPATIENT)
Facility: CLINIC | Age: 50
End: 2025-04-21
Payer: COMMERCIAL

## 2025-04-21 VITALS
BODY MASS INDEX: 47.15 KG/M2 | DIASTOLIC BLOOD PRESSURE: 72 MMHG | RESPIRATION RATE: 20 BRPM | OXYGEN SATURATION: 97 % | WEIGHT: 283 LBS | TEMPERATURE: 97.8 F | HEIGHT: 65 IN | SYSTOLIC BLOOD PRESSURE: 129 MMHG | HEART RATE: 91 BPM

## 2025-04-21 DIAGNOSIS — I10 ESSENTIAL (PRIMARY) HYPERTENSION: ICD-10-CM

## 2025-04-21 DIAGNOSIS — Z09 HOSPITAL DISCHARGE FOLLOW-UP: ICD-10-CM

## 2025-04-21 DIAGNOSIS — F39 MOOD DISORDER: ICD-10-CM

## 2025-04-21 DIAGNOSIS — N92.0 MENORRHAGIA WITH REGULAR CYCLE: ICD-10-CM

## 2025-04-21 DIAGNOSIS — L72.3 SEBACEOUS CYST: Primary | ICD-10-CM

## 2025-04-21 PROCEDURE — 1111F DSCHRG MED/CURRENT MED MERGE: CPT | Performed by: FAMILY MEDICINE

## 2025-04-21 PROCEDURE — 3078F DIAST BP <80 MM HG: CPT | Performed by: FAMILY MEDICINE

## 2025-04-21 PROCEDURE — 3074F SYST BP LT 130 MM HG: CPT | Performed by: FAMILY MEDICINE

## 2025-04-21 PROCEDURE — 99213 OFFICE O/P EST LOW 20 MIN: CPT | Performed by: FAMILY MEDICINE

## 2025-04-21 RX ORDER — ARIPIPRAZOLE 20 MG/1
20 TABLET ORAL
COMMUNITY
Start: 2025-04-12

## 2025-04-21 NOTE — PROGRESS NOTES
[facility] [place]  Cristinabrennan Salazar comes in for follow-up care.  Chest wall swelling: Patient has swelling left aspect of the chest wall on the upper quadrant of the breast.  She was seen in the emergency room for this and put on cephalexin.  States that the swelling has become bigger and is more fluctuant.  Occasionally has pain.  She had been referred to see a dermatologist but states that she was not able to afford the co-pay then.  However given the swelling has enlarged she wants to go to the emergency room for possible excision and drainage of the cyst.  After discussion patient will go to the emergency room for this.  I have placed another referral for her to be seen by the dermatologist.  Bipolar disorder: Patient has bipolar disorder.  She is followed by behavioral health specialist.  She is on Abilify.  She will continue with this medication.  Hypertension: Patient has hypertension.  Blood pressure is stable.  She is on HCTZ 25 mg daily and amlodipine 10 mg daily.  She is on potassium supplementation.  She will continue current treatment plan.  Menorrhagia: Patient has menorrhagia.  She has had her menstrual flow for more than 10 days.  Denies dizziness or syncope.  She is scheduled to see the gynecologist for this.      Past Medical History  Past Medical History:   Diagnosis Date    Bipolar 1 disorder (HCC)     Borderline diabetes     Genital herpes     Hyperlipidemia     Hypertension        Surgical History  Past Surgical History:   Procedure Laterality Date     SECTION      COLONOSCOPY N/A 2022    COLONOSCOPY/ Polypectomy performed by Hugh Brand MD at [facility] ENDOSCOPY        Medications  Current Outpatient Medications   Medication Sig Dispense Refill    ARIPiprazole (ABILIFY) 20 MG tablet 1 tablet      cephALEXin (KEFLEX) 500 MG capsule Take 1 capsule by mouth 3 times daily for 7 days 21 capsule 0    hydroCHLOROthiazide (HYDRODIURIL) 25 MG tablet take 1 tablet by mouth once daily 90 tablet 1

## 2025-04-21 NOTE — PROGRESS NOTES
\"Have you been to the ER, urgent care clinic since your last visit?  Hospitalized since your last visit?\"    Yes  [facility]    “Have you seen or consulted any other health care providers outside our system since your last visit?”    NO

## 2025-06-09 ENCOUNTER — TELEPHONE (OUTPATIENT)
Facility: CLINIC | Age: 50
End: 2025-06-09

## 2025-06-11 DIAGNOSIS — T78.40XD ALLERGY, SUBSEQUENT ENCOUNTER: ICD-10-CM

## 2025-06-11 RX ORDER — LORATADINE 10 MG/1
10 TABLET ORAL DAILY
Qty: 90 TABLET | Refills: 1 | Status: SHIPPED | OUTPATIENT
Start: 2025-06-11

## 2025-06-16 ENCOUNTER — OFFICE VISIT (OUTPATIENT)
Facility: CLINIC | Age: 50
End: 2025-06-16
Payer: COMMERCIAL

## 2025-06-16 VITALS
OXYGEN SATURATION: 98 % | HEIGHT: 65 IN | DIASTOLIC BLOOD PRESSURE: 77 MMHG | SYSTOLIC BLOOD PRESSURE: 134 MMHG | WEIGHT: 286 LBS | TEMPERATURE: 98.4 F | HEART RATE: 90 BPM | BODY MASS INDEX: 47.65 KG/M2 | RESPIRATION RATE: 20 BRPM

## 2025-06-16 DIAGNOSIS — N91.2 AMENORRHEA: ICD-10-CM

## 2025-06-16 DIAGNOSIS — E61.1 IRON DEFICIENCY: ICD-10-CM

## 2025-06-16 DIAGNOSIS — F39 MOOD DISORDER: ICD-10-CM

## 2025-06-16 DIAGNOSIS — I10 ESSENTIAL (PRIMARY) HYPERTENSION: ICD-10-CM

## 2025-06-16 DIAGNOSIS — M54.2 CERVICALGIA: ICD-10-CM

## 2025-06-16 DIAGNOSIS — M54.50 MIDLINE LOW BACK PAIN, UNSPECIFIED CHRONICITY, UNSPECIFIED WHETHER SCIATICA PRESENT: ICD-10-CM

## 2025-06-16 DIAGNOSIS — E07.9 THYROID DISORDER: Primary | ICD-10-CM

## 2025-06-16 DIAGNOSIS — J32.4 PANSINUSITIS, UNSPECIFIED CHRONICITY: ICD-10-CM

## 2025-06-16 DIAGNOSIS — E78.5 HYPERLIPIDEMIA, UNSPECIFIED HYPERLIPIDEMIA TYPE: ICD-10-CM

## 2025-06-16 PROCEDURE — 99214 OFFICE O/P EST MOD 30 MIN: CPT | Performed by: FAMILY MEDICINE

## 2025-06-16 PROCEDURE — 3075F SYST BP GE 130 - 139MM HG: CPT | Performed by: FAMILY MEDICINE

## 2025-06-16 PROCEDURE — 3078F DIAST BP <80 MM HG: CPT | Performed by: FAMILY MEDICINE

## 2025-06-16 RX ORDER — IBUPROFEN 600 MG/1
600 TABLET, FILM COATED ORAL 3 TIMES DAILY PRN
Qty: 60 TABLET | Refills: 1 | Status: SHIPPED | OUTPATIENT
Start: 2025-06-16

## 2025-06-16 RX ORDER — POTASSIUM CHLORIDE 750 MG/1
10 TABLET, EXTENDED RELEASE ORAL DAILY
Qty: 30 TABLET | Refills: 5 | Status: SHIPPED | OUTPATIENT
Start: 2025-06-16

## 2025-06-16 RX ORDER — POTASSIUM CHLORIDE 750 MG/1
10 TABLET, EXTENDED RELEASE ORAL DAILY
Qty: 90 TABLET | Refills: 1 | Status: SHIPPED | OUTPATIENT
Start: 2025-06-16 | End: 2025-06-16 | Stop reason: SDUPTHER

## 2025-06-16 RX ORDER — FLUTICASONE PROPIONATE 50 MCG
2 SPRAY, SUSPENSION (ML) NASAL DAILY
Qty: 16 G | Refills: 0 | Status: SHIPPED | OUTPATIENT
Start: 2025-06-16

## 2025-06-16 NOTE — PROGRESS NOTES
[facility]  Cristina Salazar comes in for follow up care.  Amenorrhea: Patient states that she has irregular menses.  Patient has been seen by Dr Yuan the gynecologist.  She states that she was told she has endometrial thickening.  I will request records.  Patient states that she does not think she is pregnant.  Her LMP was 05/14/2025 which is a month ago.  Has only had sexual intercourse yesterday since her last menses.  She should continue to follow-up with the gynecologist as recommended.  Thyroid disorder: Patient was seen by her gynecologist and had lab work done.  This showed low TSH and normal free T4.  She has had low TSH in the past.  This is subclinical hyperthyroidism.  Discussed management options.  I will order thyroid ultrasound.  I will refer patient to the endocrinologist for evaluation and management.  HTN: Patient has hypertension.  Blood pressure is stable.  Denies headache, changes in vision or focal weakness.  Patient is on HCTZ 25 mg daily and amlodipine 10 mg daily.  Will continue current treatment plan.  She will take a low-sodium diet.  Dyslipidemia: Patient has dyslipidemia.  She is on atorvastatin 10 mg daily.  She will exercise and take a diet low in polysaturated fats.  Will recheck lipid panel at next visit.  Iron deficiency: Patient has iron deficiency.  She is on ferrous sulfate.  Stable on medication.  Continue current treatment plan.  Will recheck labs.  Sinus congestion: Patient has frontal and maxillary sinus congestion.  She has postnasal drainage that is clear in nature.  She has tried over-the-counter medication and is doing supportive care.  Would like a refill of Claritin.  Prescription is sent in.  She will also take Flonase.  I will follow-up at next visit.  Neck and back pain: Patient has chronic neck and back pain.  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  She takes ibuprofen with good result.  Continue current treatment plan.  Refill of medication is sent

## 2025-06-23 ENCOUNTER — HOSPITAL ENCOUNTER (OUTPATIENT)
Facility: HOSPITAL | Age: 50
Discharge: HOME | End: 2025-06-26
Payer: COMMERCIAL

## 2025-06-23 DIAGNOSIS — E07.9 THYROID DISORDER: ICD-10-CM

## 2025-06-23 PROCEDURE — 76536 US EXAM OF HEAD AND NECK: CPT

## 2025-06-24 ENCOUNTER — TELEPHONE (OUTPATIENT)
Facility: CLINIC | Age: 50
End: 2025-06-24

## 2025-06-25 DIAGNOSIS — I10 ESSENTIAL (PRIMARY) HYPERTENSION: ICD-10-CM

## 2025-06-26 ENCOUNTER — TELEPHONE (OUTPATIENT)
Facility: CLINIC | Age: 50
End: 2025-06-26

## 2025-06-26 RX ORDER — HYDROCHLOROTHIAZIDE 25 MG/1
25 TABLET ORAL DAILY
Qty: 90 TABLET | Refills: 1 | OUTPATIENT
Start: 2025-06-26

## 2025-06-26 RX ORDER — AMLODIPINE BESYLATE 10 MG/1
10 TABLET ORAL DAILY
Qty: 90 TABLET | Refills: 1 | OUTPATIENT
Start: 2025-06-26

## 2025-06-26 NOTE — TELEPHONE ENCOUNTER
----- Message from Joni JIMENEZ sent at 6/26/2025  3:30 PM EDT -----  Regarding: ECC Message to Provider  ECC Message to Provider    Relationship to Patient: Self     Additional Information: Patient is calling to inform her provider that he needs to sent over her prescription for her blood pressure medicine to the [facility].  --------------------------------------------------------------------------------------------------------------------------    Call Back Information: OK to leave message on voicemail  Preferred Call Back Number: Phone [phone]

## 2025-06-29 DIAGNOSIS — I10 ESSENTIAL (PRIMARY) HYPERTENSION: ICD-10-CM

## 2025-06-30 ENCOUNTER — TELEPHONE (OUTPATIENT)
Facility: CLINIC | Age: 50
End: 2025-06-30

## 2025-06-30 RX ORDER — AMLODIPINE BESYLATE 10 MG/1
10 TABLET ORAL DAILY
Qty: 90 TABLET | Refills: 1 | Status: SHIPPED | OUTPATIENT
Start: 2025-06-30

## 2025-07-02 DIAGNOSIS — E04.1 THYROID NODULE: Primary | ICD-10-CM

## 2025-07-03 ENCOUNTER — RESULTS FOLLOW-UP (OUTPATIENT)
Facility: CLINIC | Age: 50
End: 2025-07-03

## 2025-07-22 ENCOUNTER — HOSPITAL ENCOUNTER (OUTPATIENT)
Facility: HOSPITAL | Age: 50
Discharge: HOME | End: 2025-07-25
Payer: COMMERCIAL

## 2025-07-22 VITALS — HEIGHT: 65 IN | WEIGHT: 285 LBS | BODY MASS INDEX: 47.48 KG/M2

## 2025-07-22 DIAGNOSIS — Z12.31 VISIT FOR SCREENING MAMMOGRAM: ICD-10-CM

## 2025-07-22 PROCEDURE — 77067 SCR MAMMO BI INCL CAD: CPT

## 2025-08-19 ENCOUNTER — OFFICE VISIT (OUTPATIENT)
Facility: CLINIC | Age: 50
End: 2025-08-19
Payer: COMMERCIAL

## 2025-08-19 VITALS
WEIGHT: 292 LBS | HEIGHT: 65 IN | TEMPERATURE: 97.8 F | OXYGEN SATURATION: 98 % | DIASTOLIC BLOOD PRESSURE: 82 MMHG | RESPIRATION RATE: 20 BRPM | SYSTOLIC BLOOD PRESSURE: 128 MMHG | HEART RATE: 86 BPM | BODY MASS INDEX: 48.65 KG/M2

## 2025-08-19 DIAGNOSIS — F39 MOOD DISORDER: ICD-10-CM

## 2025-08-19 DIAGNOSIS — E07.9 THYROID DISORDER: ICD-10-CM

## 2025-08-19 DIAGNOSIS — E78.5 HYPERLIPIDEMIA, UNSPECIFIED HYPERLIPIDEMIA TYPE: ICD-10-CM

## 2025-08-19 DIAGNOSIS — R60.0 PEDAL EDEMA: ICD-10-CM

## 2025-08-19 DIAGNOSIS — I10 ESSENTIAL (PRIMARY) HYPERTENSION: Primary | ICD-10-CM

## 2025-08-19 DIAGNOSIS — T78.40XA ALLERGY, INITIAL ENCOUNTER: ICD-10-CM

## 2025-08-19 DIAGNOSIS — E61.1 IRON DEFICIENCY: ICD-10-CM

## 2025-08-19 PROCEDURE — 99214 OFFICE O/P EST MOD 30 MIN: CPT | Performed by: FAMILY MEDICINE

## 2025-08-19 PROCEDURE — 3074F SYST BP LT 130 MM HG: CPT | Performed by: FAMILY MEDICINE

## 2025-08-19 PROCEDURE — 3079F DIAST BP 80-89 MM HG: CPT | Performed by: FAMILY MEDICINE

## 2025-08-19 RX ORDER — LEVOCETIRIZINE DIHYDROCHLORIDE 5 MG/1
5 TABLET, FILM COATED ORAL NIGHTLY
Qty: 30 TABLET | Refills: 2 | Status: SHIPPED | OUTPATIENT
Start: 2025-08-19

## 2025-08-19 RX ORDER — HYDROCHLOROTHIAZIDE 25 MG/1
25 TABLET ORAL DAILY
Qty: 30 TABLET | Refills: 2 | Status: SHIPPED | OUTPATIENT
Start: 2025-08-19

## 2025-08-19 RX ORDER — LOSARTAN POTASSIUM 100 MG/1
100 TABLET ORAL DAILY
Qty: 30 TABLET | Refills: 3 | Status: SHIPPED | OUTPATIENT
Start: 2025-08-19

## 2025-08-19 RX ORDER — AMLODIPINE BESYLATE 10 MG/1
10 TABLET ORAL DAILY
Qty: 90 TABLET | Refills: 1 | Status: CANCELLED | OUTPATIENT
Start: 2025-08-19

## (undated) DEVICE — CATHETER SUCT TR FL TIP 14FR W/ O CTRL

## (undated) DEVICE — SYR 50ML SLIP TIP NSAF LF STRL --

## (undated) DEVICE — YANKAUER,SMOOTH HANDLE,HIGH CAPACITY: Brand: MEDLINE INDUSTRIES, INC.

## (undated) DEVICE — CANNULA NSL AD TBNG L14FT STD PVC O2 CRV CONN NONFLARED NSL

## (undated) DEVICE — CATHETER THOR 36FR DIA10.7MM POLYVI CHL TRCR TIP STR SFT

## (undated) DEVICE — SOLUTION IRRIG 1000ML H2O STRL BLT

## (undated) DEVICE — GAUZE,SPONGE,4"X4",16PLY,STRL,LF,10/TRAY: Brand: MEDLINE

## (undated) DEVICE — ENDOSCOPY PUMP TUBING/ CAP SET: Brand: ERBE

## (undated) DEVICE — FLUFF AND POLYMER UNDERPAD,EXTRA HEAVY: Brand: WINGS

## (undated) DEVICE — MEDI-VAC NON-CONDUCTIVE SUCTION TUBING: Brand: CARDINAL HEALTH

## (undated) DEVICE — CANNULA ORIG TL CLR W FOAM CUSHIONS AND 14FT SUPL TB 3 CHN

## (undated) DEVICE — FORCEPS BX L240CM JAW DIA2.8MM L CAP W/ NDL MIC MESH TOOTH

## (undated) DEVICE — SYR 20ML LL STRL LF --

## (undated) DEVICE — SYR 10ML LUER LOK 1/5ML GRAD --

## (undated) DEVICE — GOWN ISOL IMPERV UNIV, DISP, OPEN BACK, BLUE --

## (undated) DEVICE — LINER SUCT CANSTR 3000CC PLAS SFT PRE ASSEMB W/OUT TBNG W/

## (undated) DEVICE — SYRINGE MED 25GA 3ML L5/8IN SUBQ PLAS W/ DETACH NDL SFTY